# Patient Record
Sex: MALE | Race: WHITE | NOT HISPANIC OR LATINO | URBAN - METROPOLITAN AREA
[De-identification: names, ages, dates, MRNs, and addresses within clinical notes are randomized per-mention and may not be internally consistent; named-entity substitution may affect disease eponyms.]

---

## 2017-03-23 ENCOUNTER — OUTPATIENT (OUTPATIENT)
Dept: OUTPATIENT SERVICES | Facility: HOSPITAL | Age: 77
LOS: 1 days | Discharge: HOME | End: 2017-03-23

## 2017-06-27 DIAGNOSIS — R91.8 OTHER NONSPECIFIC ABNORMAL FINDING OF LUNG FIELD: ICD-10-CM

## 2017-08-24 ENCOUNTER — OUTPATIENT (OUTPATIENT)
Dept: OUTPATIENT SERVICES | Facility: HOSPITAL | Age: 77
LOS: 1 days | Discharge: HOME | End: 2017-08-24

## 2017-08-24 DIAGNOSIS — R04.0 EPISTAXIS: ICD-10-CM

## 2017-08-24 DIAGNOSIS — I10 ESSENTIAL (PRIMARY) HYPERTENSION: ICD-10-CM

## 2017-08-24 DIAGNOSIS — I25.10 ATHEROSCLEROTIC HEART DISEASE OF NATIVE CORONARY ARTERY WITHOUT ANGINA PECTORIS: ICD-10-CM

## 2017-08-24 DIAGNOSIS — C34.90 MALIGNANT NEOPLASM OF UNSPECIFIED PART OF UNSPECIFIED BRONCHUS OR LUNG: ICD-10-CM

## 2017-08-24 DIAGNOSIS — R05 COUGH: ICD-10-CM

## 2017-08-24 DIAGNOSIS — E78.5 HYPERLIPIDEMIA, UNSPECIFIED: ICD-10-CM

## 2018-01-31 ENCOUNTER — INPATIENT (INPATIENT)
Facility: HOSPITAL | Age: 78
LOS: 0 days | Discharge: HOME | End: 2018-02-01
Attending: INTERNAL MEDICINE | Admitting: INTERNAL MEDICINE

## 2018-02-04 ENCOUNTER — INPATIENT (INPATIENT)
Facility: HOSPITAL | Age: 78
LOS: 1 days | Discharge: HOME | End: 2018-02-06
Attending: INTERNAL MEDICINE | Admitting: INTERNAL MEDICINE

## 2018-02-04 VITALS
WEIGHT: 240.08 LBS | SYSTOLIC BLOOD PRESSURE: 185 MMHG | TEMPERATURE: 97 F | OXYGEN SATURATION: 96 % | DIASTOLIC BLOOD PRESSURE: 83 MMHG | HEART RATE: 51 BPM | RESPIRATION RATE: 18 BRPM

## 2018-02-04 DIAGNOSIS — R04.0 EPISTAXIS: ICD-10-CM

## 2018-02-04 DIAGNOSIS — Z90.2 ACQUIRED ABSENCE OF LUNG [PART OF]: Chronic | ICD-10-CM

## 2018-02-04 DIAGNOSIS — J44.9 CHRONIC OBSTRUCTIVE PULMONARY DISEASE, UNSPECIFIED: ICD-10-CM

## 2018-02-04 DIAGNOSIS — R07.9 CHEST PAIN, UNSPECIFIED: ICD-10-CM

## 2018-02-04 DIAGNOSIS — I10 ESSENTIAL (PRIMARY) HYPERTENSION: ICD-10-CM

## 2018-02-04 DIAGNOSIS — I25.10 ATHEROSCLEROTIC HEART DISEASE OF NATIVE CORONARY ARTERY WITHOUT ANGINA PECTORIS: ICD-10-CM

## 2018-02-04 DIAGNOSIS — K21.9 GASTRO-ESOPHAGEAL REFLUX DISEASE WITHOUT ESOPHAGITIS: ICD-10-CM

## 2018-02-04 DIAGNOSIS — E78.5 HYPERLIPIDEMIA, UNSPECIFIED: ICD-10-CM

## 2018-02-04 DIAGNOSIS — E89.0 POSTPROCEDURAL HYPOTHYROIDISM: Chronic | ICD-10-CM

## 2018-02-04 LAB
ALBUMIN SERPL ELPH-MCNC: 3.7 G/DL — SIGNIFICANT CHANGE UP (ref 3–5.5)
ALBUMIN SERPL ELPH-MCNC: 3.8 G/DL — SIGNIFICANT CHANGE UP (ref 3–5.5)
ALP SERPL-CCNC: 100 U/L — SIGNIFICANT CHANGE UP (ref 30–115)
ALP SERPL-CCNC: 92 U/L — SIGNIFICANT CHANGE UP (ref 30–115)
ALT FLD-CCNC: 12 U/L — SIGNIFICANT CHANGE UP (ref 0–41)
ALT FLD-CCNC: 15 U/L — SIGNIFICANT CHANGE UP (ref 0–41)
ANION GAP SERPL CALC-SCNC: 15 MMOL/L — HIGH (ref 7–14)
ANION GAP SERPL CALC-SCNC: 9 MMOL/L — SIGNIFICANT CHANGE UP (ref 7–14)
AST SERPL-CCNC: 16 U/L — SIGNIFICANT CHANGE UP (ref 0–41)
AST SERPL-CCNC: 19 U/L — SIGNIFICANT CHANGE UP (ref 0–41)
BASOPHILS # BLD AUTO: 0.03 K/UL — SIGNIFICANT CHANGE UP (ref 0–0.2)
BASOPHILS NFR BLD AUTO: 0.4 % — SIGNIFICANT CHANGE UP (ref 0–1)
BILIRUB DIRECT SERPL-MCNC: 0.3 MG/DL — HIGH (ref 0–0.2)
BILIRUB DIRECT SERPL-MCNC: 0.3 MG/DL — HIGH (ref 0–0.2)
BILIRUB INDIRECT FLD-MCNC: 0.6 MG/DL — SIGNIFICANT CHANGE UP
BILIRUB INDIRECT FLD-MCNC: 0.8 MG/DL — SIGNIFICANT CHANGE UP
BILIRUB SERPL-MCNC: 0.9 MG/DL — SIGNIFICANT CHANGE UP (ref 0.2–1.2)
BILIRUB SERPL-MCNC: 1.1 MG/DL — SIGNIFICANT CHANGE UP (ref 0.2–1.2)
BUN SERPL-MCNC: 14 MG/DL — SIGNIFICANT CHANGE UP (ref 10–20)
BUN SERPL-MCNC: 15 MG/DL — SIGNIFICANT CHANGE UP (ref 10–20)
CALCIUM SERPL-MCNC: 8.9 MG/DL — SIGNIFICANT CHANGE UP (ref 8.5–10.1)
CALCIUM SERPL-MCNC: 9.1 MG/DL — SIGNIFICANT CHANGE UP (ref 8.5–10.1)
CHLORIDE SERPL-SCNC: 103 MMOL/L — SIGNIFICANT CHANGE UP (ref 98–110)
CHLORIDE SERPL-SCNC: 99 MMOL/L — SIGNIFICANT CHANGE UP (ref 98–110)
CHOLEST SERPL-MCNC: 125 MG/DL — SIGNIFICANT CHANGE UP (ref 100–200)
CK MB BLD-MCNC: 3 % — SIGNIFICANT CHANGE UP (ref 0–4)
CK MB CFR SERPL CALC: 1.6 NG/ML — SIGNIFICANT CHANGE UP (ref 0.6–6.3)
CK MB CFR SERPL CALC: 1.7 NG/ML — SIGNIFICANT CHANGE UP (ref 0.6–6.3)
CK MB CFR SERPL CALC: 1.8 NG/ML — SIGNIFICANT CHANGE UP (ref 0.6–6.3)
CK SERPL-CCNC: 55 U/L — SIGNIFICANT CHANGE UP (ref 0–225)
CK SERPL-CCNC: 58 U/L — SIGNIFICANT CHANGE UP (ref 0–225)
CK SERPL-CCNC: 80 U/L — SIGNIFICANT CHANGE UP (ref 0–225)
CO2 SERPL-SCNC: 24 MMOL/L — SIGNIFICANT CHANGE UP (ref 17–32)
CO2 SERPL-SCNC: 30 MMOL/L — SIGNIFICANT CHANGE UP (ref 17–32)
CREAT SERPL-MCNC: 1.2 MG/DL — SIGNIFICANT CHANGE UP (ref 0.7–1.5)
CREAT SERPL-MCNC: 1.3 MG/DL — SIGNIFICANT CHANGE UP (ref 0.7–1.5)
EOSINOPHIL # BLD AUTO: 0.1 K/UL — SIGNIFICANT CHANGE UP (ref 0–0.7)
EOSINOPHIL NFR BLD AUTO: 1.3 % — SIGNIFICANT CHANGE UP (ref 0–8)
GLUCOSE SERPL-MCNC: 125 MG/DL — HIGH (ref 70–110)
GLUCOSE SERPL-MCNC: 134 MG/DL — HIGH (ref 70–110)
HCT VFR BLD CALC: 40.8 % — LOW (ref 42–52)
HDLC SERPL-MCNC: 43 MG/DL — SIGNIFICANT CHANGE UP (ref 40–60)
HGB BLD-MCNC: 13.2 G/DL — LOW (ref 14–18)
IMM GRANULOCYTES NFR BLD AUTO: 0.5 % — HIGH (ref 0.1–0.3)
LIDOCAIN IGE QN: 19 U/L — SIGNIFICANT CHANGE UP (ref 7–60)
LIPID PNL WITH DIRECT LDL SERPL: 62 MG/DL — SIGNIFICANT CHANGE UP (ref 50–100)
LYMPHOCYTES # BLD AUTO: 0.77 K/UL — LOW (ref 1.2–3.4)
LYMPHOCYTES # BLD AUTO: 10.3 % — LOW (ref 20.5–51.1)
MCHC RBC-ENTMCNC: 28.1 PG — SIGNIFICANT CHANGE UP (ref 27–31)
MCHC RBC-ENTMCNC: 32.4 G/DL — SIGNIFICANT CHANGE UP (ref 32–37)
MCV RBC AUTO: 86.8 FL — SIGNIFICANT CHANGE UP (ref 80–94)
MONOCYTES # BLD AUTO: 0.47 K/UL — SIGNIFICANT CHANGE UP (ref 0.1–0.6)
MONOCYTES NFR BLD AUTO: 6.3 % — SIGNIFICANT CHANGE UP (ref 1.7–9.3)
NEUTROPHILS # BLD AUTO: 6.03 K/UL — SIGNIFICANT CHANGE UP (ref 1.4–6.5)
NEUTROPHILS NFR BLD AUTO: 81.2 % — HIGH (ref 42.2–75.2)
PLATELET # BLD AUTO: 164 K/UL — SIGNIFICANT CHANGE UP (ref 130–400)
POTASSIUM SERPL-MCNC: 3.8 MMOL/L — SIGNIFICANT CHANGE UP (ref 3.5–5)
POTASSIUM SERPL-MCNC: 4.2 MMOL/L — SIGNIFICANT CHANGE UP (ref 3.5–5)
POTASSIUM SERPL-SCNC: 3.8 MMOL/L — SIGNIFICANT CHANGE UP (ref 3.5–5)
POTASSIUM SERPL-SCNC: 4.2 MMOL/L — SIGNIFICANT CHANGE UP (ref 3.5–5)
PROT SERPL-MCNC: 6.9 G/DL — SIGNIFICANT CHANGE UP (ref 6–8)
PROT SERPL-MCNC: 7 G/DL — SIGNIFICANT CHANGE UP (ref 6–8)
RBC # BLD: 4.7 M/UL — SIGNIFICANT CHANGE UP (ref 4.7–6.1)
RBC # FLD: 14.6 % — HIGH (ref 11.5–14.5)
SODIUM SERPL-SCNC: 138 MMOL/L — SIGNIFICANT CHANGE UP (ref 135–146)
SODIUM SERPL-SCNC: 142 MMOL/L — SIGNIFICANT CHANGE UP (ref 135–146)
TOTAL CHOLESTEROL/HDL RATIO MEASUREMENT: 2.9 RATIO — LOW (ref 4–5.5)
TRIGL SERPL-MCNC: 74 MG/DL — SIGNIFICANT CHANGE UP (ref 40–150)
TROPONIN I SERPL-MCNC: 0.02 NG/ML — SIGNIFICANT CHANGE UP (ref 0–0.05)
TROPONIN I SERPL-MCNC: <0.02 NG/ML — SIGNIFICANT CHANGE UP (ref 0–0.05)
TROPONIN I SERPL-MCNC: <0.02 NG/ML — SIGNIFICANT CHANGE UP (ref 0–0.05)
WBC # BLD: 7.44 K/UL — SIGNIFICANT CHANGE UP (ref 4.8–10.8)
WBC # FLD AUTO: 7.44 K/UL — SIGNIFICANT CHANGE UP (ref 4.8–10.8)

## 2018-02-04 RX ORDER — ONDANSETRON 8 MG/1
4 TABLET, FILM COATED ORAL ONCE
Qty: 0 | Refills: 0 | Status: COMPLETED | OUTPATIENT
Start: 2018-02-04 | End: 2018-02-04

## 2018-02-04 RX ORDER — ACETAMINOPHEN 500 MG
650 TABLET ORAL EVERY 4 HOURS
Qty: 0 | Refills: 0 | Status: DISCONTINUED | OUTPATIENT
Start: 2018-02-04 | End: 2018-02-06

## 2018-02-04 RX ORDER — PANTOPRAZOLE SODIUM 20 MG/1
1 TABLET, DELAYED RELEASE ORAL
Qty: 0 | Refills: 0 | COMMUNITY

## 2018-02-04 RX ORDER — MORPHINE SULFATE 50 MG/1
4 CAPSULE, EXTENDED RELEASE ORAL ONCE
Qty: 0 | Refills: 0 | Status: DISCONTINUED | OUTPATIENT
Start: 2018-02-04 | End: 2018-02-04

## 2018-02-04 RX ORDER — METOPROLOL TARTRATE 50 MG
50 TABLET ORAL DAILY
Qty: 0 | Refills: 0 | Status: DISCONTINUED | OUTPATIENT
Start: 2018-02-04 | End: 2018-02-06

## 2018-02-04 RX ORDER — PANTOPRAZOLE SODIUM 20 MG/1
40 TABLET, DELAYED RELEASE ORAL
Qty: 0 | Refills: 0 | Status: DISCONTINUED | OUTPATIENT
Start: 2018-02-04 | End: 2018-02-06

## 2018-02-04 RX ORDER — LOSARTAN POTASSIUM 100 MG/1
50 TABLET, FILM COATED ORAL DAILY
Qty: 0 | Refills: 0 | Status: DISCONTINUED | OUTPATIENT
Start: 2018-02-04 | End: 2018-02-04

## 2018-02-04 RX ORDER — LOSARTAN POTASSIUM 100 MG/1
100 TABLET, FILM COATED ORAL DAILY
Qty: 0 | Refills: 0 | Status: DISCONTINUED | OUTPATIENT
Start: 2018-02-04 | End: 2018-02-06

## 2018-02-04 RX ORDER — METOPROLOL TARTRATE 50 MG
1 TABLET ORAL
Qty: 0 | Refills: 0 | COMMUNITY

## 2018-02-04 RX ORDER — LEVOTHYROXINE SODIUM 125 MCG
137 TABLET ORAL DAILY
Qty: 0 | Refills: 0 | Status: DISCONTINUED | OUTPATIENT
Start: 2018-02-04 | End: 2018-02-04

## 2018-02-04 RX ORDER — HEPARIN SODIUM 5000 [USP'U]/ML
5000 INJECTION INTRAVENOUS; SUBCUTANEOUS EVERY 8 HOURS
Qty: 0 | Refills: 0 | Status: DISCONTINUED | OUTPATIENT
Start: 2018-02-04 | End: 2018-02-06

## 2018-02-04 RX ORDER — LEVOTHYROXINE SODIUM 125 MCG
1 TABLET ORAL
Qty: 0 | Refills: 0 | COMMUNITY

## 2018-02-04 RX ORDER — BUDESONIDE AND FORMOTEROL FUMARATE DIHYDRATE 160; 4.5 UG/1; UG/1
2 AEROSOL RESPIRATORY (INHALATION)
Qty: 0 | Refills: 0 | Status: DISCONTINUED | OUTPATIENT
Start: 2018-02-04 | End: 2018-02-06

## 2018-02-04 RX ORDER — ASPIRIN/CALCIUM CARB/MAGNESIUM 324 MG
81 TABLET ORAL ONCE
Qty: 0 | Refills: 0 | Status: COMPLETED | OUTPATIENT
Start: 2018-02-04 | End: 2018-02-04

## 2018-02-04 RX ORDER — SODIUM CHLORIDE 9 MG/ML
3 INJECTION INTRAMUSCULAR; INTRAVENOUS; SUBCUTANEOUS ONCE
Qty: 0 | Refills: 0 | Status: COMPLETED | OUTPATIENT
Start: 2018-02-04 | End: 2018-02-04

## 2018-02-04 RX ADMIN — Medication 81 MILLIGRAM(S): at 11:24

## 2018-02-04 RX ADMIN — MORPHINE SULFATE 4 MILLIGRAM(S): 50 CAPSULE, EXTENDED RELEASE ORAL at 16:36

## 2018-02-04 RX ADMIN — MORPHINE SULFATE 4 MILLIGRAM(S): 50 CAPSULE, EXTENDED RELEASE ORAL at 12:13

## 2018-02-04 RX ADMIN — HEPARIN SODIUM 5000 UNIT(S): 5000 INJECTION INTRAVENOUS; SUBCUTANEOUS at 22:26

## 2018-02-04 RX ADMIN — MORPHINE SULFATE 4 MILLIGRAM(S): 50 CAPSULE, EXTENDED RELEASE ORAL at 12:31

## 2018-02-04 RX ADMIN — SODIUM CHLORIDE 3 MILLILITER(S): 9 INJECTION INTRAMUSCULAR; INTRAVENOUS; SUBCUTANEOUS at 12:13

## 2018-02-04 RX ADMIN — MORPHINE SULFATE 4 MILLIGRAM(S): 50 CAPSULE, EXTENDED RELEASE ORAL at 17:30

## 2018-02-04 RX ADMIN — MORPHINE SULFATE 4 MILLIGRAM(S): 50 CAPSULE, EXTENDED RELEASE ORAL at 16:00

## 2018-02-04 RX ADMIN — MORPHINE SULFATE 4 MILLIGRAM(S): 50 CAPSULE, EXTENDED RELEASE ORAL at 15:29

## 2018-02-04 RX ADMIN — ONDANSETRON 4 MILLIGRAM(S): 8 TABLET, FILM COATED ORAL at 12:13

## 2018-02-04 RX ADMIN — ONDANSETRON 4 MILLIGRAM(S): 8 TABLET, FILM COATED ORAL at 18:28

## 2018-02-04 RX ADMIN — LOSARTAN POTASSIUM 100 MILLIGRAM(S): 100 TABLET, FILM COATED ORAL at 22:26

## 2018-02-04 RX ADMIN — Medication 30 MILLILITER(S): at 22:26

## 2018-02-04 RX ADMIN — Medication 50 MILLIGRAM(S): at 22:26

## 2018-02-04 NOTE — ED ADULT NURSE NOTE - PMH
Gastroesophageal reflux disease, esophagitis presence not specified    Hypertension, unspecified type    Malignant neoplasm of lung, unspecified laterality, unspecified part of lung

## 2018-02-04 NOTE — ED PROVIDER NOTE - PMH
Chronic obstructive pulmonary disease, unspecified COPD type    Coronary artery disease involving native heart, angina presence unspecified, unspecified vessel or lesion type    Dyslipidemia    Gastroesophageal reflux disease, esophagitis presence not specified    Hypertension, unspecified type    Malignant neoplasm of lung, unspecified laterality, unspecified part of lung    ALTAF (obstructive sleep apnea)    Thyroid cancer

## 2018-02-04 NOTE — H&P ADULT - NSHPREVIEWOFSYSTEMS_GEN_ALL_CORE
REVIEW OF SYSTEMS:    CONSTITUTIONAL: No weakness, fevers or chills  EYES/ENT: No visual changes;  No vertigo or throat pain   NECK: No pain or stiffness  RESPIRATORY: No cough, wheezing, hemoptysis; No shortness of breath  CARDIOVASCULAR: see HPI  GASTROINTESTINAL: No abdominal or epigastric pain. No nausea, vomiting, or hematemesis; No diarrhea or constipation. No melena or hematochezia.  GENITOURINARY: No dysuria, frequency or hematuria  NEUROLOGICAL: No numbness or weakness  SKIN: No itching, rashes

## 2018-02-04 NOTE — ED PROVIDER NOTE - OBJECTIVE STATEMENT
76yo M with PMHx CAD s/p PCI, lungCA s/p LLL lobectomy, thyroid CA s/p thyroidectomy, HTN, HLD, COPD/ALTAF p/w chest pain x7 hours. CP started at 1am this morning, patient was lying in bed at the time. Feels like pressure, started in mid chest but now radiating to left and right anterior chest. Assoc with intermittent cough for few days. Pt was recently admitted to the hospital on 1/31 for similar chest pain, 3 sets negative troponins, had negative nuclear stress test, EF 60%. Denies nausea, vomiting, diaphoresis, SOB, palpitations. Able to climb flight of staris without chest pain at baseline. Quit smoking 20 years ago.   Cardiology: Dr. Calderon.

## 2018-02-04 NOTE — H&P ADULT - HISTORY OF PRESENT ILLNESS
77M with CAD s/p Stent, COPD, Lung Ca s/p lobectomy, HTN, HLD, and Thyroid Ca s/p removal presents for recurrent eipsodes of chest pain. Patient was recently seen at [facility] for chest pain on 1/31 and had a negative work up with an unremarkable Nuc. Stress test. Patient was discharged home and subsequently experienced similar pains 2 more time with today being the worst. Patient described the pain as 9/10  at its peak, progressive (waking him up from sleep), pressure like, lower substernal area with radiation to b/l chest and back, aggrevating factors noted. Pain occurs while sleeping or at rest. Patient denies recent illness, fever, chills, SOB, LOC, Palpitations, leg swelling, n/v/d, or excessive sweating. Patient states he has a history of GERD but his heartburn is well controlled currently and has no abdominal pain, blood in stools.    Nuc Stress Test 2/1: NORMAL ADENOSINE / REST MYOCARDIAL PERFUSION TOMOGRAPHY, WITH NO EVIDENCE FOR ISCHEMIA DURING ADENOSINE INFUSION. NORMAL RESTING LEFT VENTRICULAR WALL MOTION AND WALL THICKENING. LEFT VENTRICULAR EJECTION FRACTION OF 60% WHICH IS WITHIN RANGE OF NORMAL. 77M with CAD s/p Stent (several yrs ago), COPD, Lung Ca s/p lobectomy, HTN, HLD, and Thyroid Ca s/p removal presents for recurrent episodes of chest pain. Patient was recently seen at [facility] for chest pain on 1/31 and had a negative work up with an unremarkable Nuc. Stress test. Patient was discharged home and subsequently experienced similar pains 2 more time with today being the worst. Patient described the pain as 9/10  at its peak, progressive (waking him up from sleep), pressure like, lower substernal area with radiation to b/l chest and back,no aggravating factors noted. Pain occurs while sleeping or at rest. pain resolve don its own. Patient denies recent illness, fever, chills, SOB, LOC, Palpitations, leg swelling, n/v/d, or excessive sweating. Patient states he has a history of GERD but his heartburn is well controlled currently and has no abdominal pain, blood in stools.    Nuc Stress Test 2/1: NORMAL ADENOSINE / REST MYOCARDIAL PERFUSION TOMOGRAPHY, WITH NO EVIDENCE FOR ISCHEMIA DURING ADENOSINE INFUSION. NORMAL RESTING LEFT VENTRICULAR WALL MOTION AND WALL THICKENING. LEFT VENTRICULAR EJECTION FRACTION OF 60% WHICH IS WITHIN RANGE OF NORMAL.

## 2018-02-04 NOTE — ED PROVIDER NOTE - NS ED ROS FT
Constitutional: No fever, chills.  Eyes:  No visual changes  ENMT:  No sore throat  Cardiac:  +chest pain. No palpitations  Respiratory:  No cough, SOB  GI:  No nausea, vomiting, diarrhea, abdominal pain.  :  No dysuria  MS:  No myalgia, back pain  Neuro:  No headache or lightheadedness  Skin:  No skin rash  Endocrine: h/o pre-DM  Except as documented in the HPI,  all other systems are negative.

## 2018-02-04 NOTE — ED PROVIDER NOTE - PHYSICAL EXAMINATION
Vital signs reviewed  GENERAL: Patient well appearing, NAD  HEAD: NCAT  EYES: PERRL, EOMI  ENT: MMM  NECK: Supple, non tender, trachea midline, no JVD  RESPIRATORY: Normal respiratory effort. CTA B/L. No wheezing, rales, rhonchi  CARDIOVASCULAR: Regular rate and rhythm. Normal S1/S2. No murmurs, rubs or gallops.  ABDOMEN: Soft. Obese. Nondistended. Nontender. No guarding or rebound. No palpable masses. Normal bowel sounds. No CVA tenderness.  MUSCULOSKELETAL/EXTREMITIES: Brisk cap refill. 2+ radial pulses. No leg edema.  SKIN:  Warm and dry. No acute rash.  NEURO: AAOx3. No gross FND.  PSYCHIATRIC: Cooperative. Affect appropriate. Insight and judgement normal. Memory intact. Thought normal.

## 2018-02-04 NOTE — H&P ADULT - PROBLEM SELECTOR PLAN 2
Uncontrolled, was previously uncontrolled in prior visit   Will increase losartan to 100mg  cont other home medications

## 2018-02-04 NOTE — ED PROVIDER NOTE - PROGRESS NOTE DETAILS
Pt had episode of vomiting in ED, c/o pain radiating to back. Will give morphine, zofran, order CTA dissection. Pt states back pain resolved, pain remains midsternal. Nausea resolved.

## 2018-02-04 NOTE — H&P ADULT - NSHPLABSRESULTS_GEN_ALL_CORE
ICU Vital Signs Last 24 Hrs  T(C): 35.9 (04 Feb 2018 09:30), Max: 35.9 (04 Feb 2018 09:30)  T(F): 96.7 (04 Feb 2018 09:30), Max: 96.7 (04 Feb 2018 09:30)  HR: 51 (04 Feb 2018 09:30) (51 - 51)  BP: 185/83 (04 Feb 2018 09:30) (185/83 - 185/83)  BP(mean): --  ABP: --  ABP(mean): --  RR: 18 (04 Feb 2018 09:30) (18 - 18)  SpO2: 96% (04 Feb 2018 09:30) (96% - 96%)                          13.2   7.44  )-----------( 164      ( 04 Feb 2018 10:42 )             40.8   02-04    142  |  103  |  15  ----------------------------<  125<H>  4.2   |  24  |  1.2    Ca    8.9      04 Feb 2018 10:42    CARDIAC MARKERS ( 04 Feb 2018 10:42 )  <0.02 ng/mL / x     / 80 U/L / x     / 1.7 ng/mL

## 2018-02-04 NOTE — H&P ADULT - FAMILY HISTORY
Mother  Still living? Unknown  Family history of heart disease, Age at diagnosis: Age Unknown     Father  Still living? No  Family history of myocardial infarction, Age at diagnosis: Age Unknown     Sibling  Still living? No  Family history of prostate cancer, Age at diagnosis: Age Unknown

## 2018-02-04 NOTE — ED PROVIDER NOTE - MEDICAL DECISION MAKING DETAILS
I personally evaluated the patient. I reviewed the Resident’s or Physician Assistant’s note (as assigned above), and agree with the findings and plan except as documented in my note.    pt admitted for CP with acute ischemic EKG changes, TWI v1-5, ruled out for aortic dissection and cholecystitis. hx CAD stents.

## 2018-02-04 NOTE — H&P ADULT - PROBLEM SELECTOR PLAN 1
HTN urgency vs ACS vs GERD  r/o ACS, repeat cardiac Enzymes though doubt myocardial damage/MI  Cardiology cs: masticculo  Admit to tele  EKG shows chronic LBBB but possible reversal of block with lower heart rates. T wave inversions present on V1, v2, v3, v4 r/o obstructive CAD  Cardiology cs: masticculo  Admit to tele  initial ekg: sinus joseph with inverted/biphasic t waves in precordial leads.   second ekg- sinus rhythm with 1st degree av block, LBBB (pt had LBBB prior) r/o obstructive CAD  Cardiology cs: masticculo  Admit to tele  ASA 81mg  initial ekg: sinus joseph with inverted/biphasic t waves in precordial leads.   second ekg- sinus rhythm with 1st degree av block, LBBB (pt had LBBB prior)

## 2018-02-04 NOTE — ED PROVIDER NOTE - ATTENDING CONTRIBUTION TO CARE
77M PMH CAD stent, lung ca s/p lobectomy, thyroid ca s/p resection, htn, hl, copd, magdaleno, p/w. started last night while laying in bed. chest pressure which is bilateral. assoc w dry cough. admitted 1/31 for CP, had 3 neg CE and neg nuc stress, ef 60%. ex smoker. while in ED pt developed vomiting and BP. no trauma. no le edema, calf pain, immobolization. pain is also epigastric. no d/c. no urinary sx. no neuro sx. on exam, AFVSS, well deep nad, ncat,eomi, perrla, mmm, lctab, rrr nl s1s2 no mrg, nontender chest, abd soft ntnd, aaox3, no focal deficits, a/p: CP, vomiting, BP, recent negative work up ACS, however new TWI today on EKG V1-5, r/o ACS, will also r/o dissection given active vomiting and BP in ED, H&P not c/w pe, ptx, pna, esoph perf, tamponde. will do labs, ekg/trop, cxr, asa, CTA, ivf, pain control, antiemetics, re-eval.

## 2018-02-04 NOTE — H&P ADULT - ASSESSMENT
77M with CAD s/p Stent, COPD, Lung Ca s/p lobectomy, HTN, HLD, and Thyroid Ca s/p removal presents for recurrent episodes of chest pain. Patient was recently seen at [facility] for chest pain on 1/31 and had a negative work up with an unremarkable Nuc Stress test and work up.       DVT ppx: Hepatin  Diet: Anti reflux, Heart Healthy, Low Salt  Full Code  Admit to tele  Activity : as tolerated 77M with CAD s/p Stent, COPD, Lung Ca s/p lobectomy, HTN, HLD, and Thyroid Ca s/p removal presents for recurrent episodes of chest pain. Patient was recently seen at [facility] for chest pain on 1/31 and had a negative work up with an unremarkable Nuc Stress test and work up.       DVT ppx: Heparin  Diet: Anti reflux, Heart Healthy, Low Salt  Full Code  Admit to tele  Activity : as tolerated

## 2018-02-04 NOTE — H&P ADULT - PMH
Chronic obstructive pulmonary disease, unspecified COPD type    Coronary artery disease involving native heart, angina presence unspecified, unspecified vessel or lesion type    Dyslipidemia    Gastroesophageal reflux disease, esophagitis presence not specified    Hypertension, unspecified type    Lung cancer    ALTAF (obstructive sleep apnea)    Thyroid cancer

## 2018-02-05 LAB
CK MB BLD-MCNC: 2 % — SIGNIFICANT CHANGE UP (ref 0–4)
CK MB CFR SERPL CALC: 1.7 NG/ML — SIGNIFICANT CHANGE UP (ref 0.6–6.3)
CK SERPL-CCNC: 95 U/L — SIGNIFICANT CHANGE UP (ref 0–225)
TROPONIN I SERPL-MCNC: 0.03 NG/ML — SIGNIFICANT CHANGE UP (ref 0–0.05)

## 2018-02-05 RX ORDER — SODIUM CHLORIDE 9 MG/ML
1000 INJECTION INTRAMUSCULAR; INTRAVENOUS; SUBCUTANEOUS
Qty: 0 | Refills: 0 | Status: DISCONTINUED | OUTPATIENT
Start: 2018-02-05 | End: 2018-02-06

## 2018-02-05 RX ORDER — RABEPRAZOLE 20 MG/1
40 TABLET, DELAYED RELEASE ORAL
Qty: 0 | Refills: 0 | COMMUNITY

## 2018-02-05 RX ORDER — ASPIRIN/CALCIUM CARB/MAGNESIUM 324 MG
325 TABLET ORAL ONCE
Qty: 0 | Refills: 0 | Status: COMPLETED | OUTPATIENT
Start: 2018-02-05 | End: 2018-02-05

## 2018-02-05 RX ORDER — ONDANSETRON 8 MG/1
4 TABLET, FILM COATED ORAL
Qty: 0 | Refills: 0 | Status: DISCONTINUED | OUTPATIENT
Start: 2018-02-05 | End: 2018-02-06

## 2018-02-05 RX ADMIN — HEPARIN SODIUM 5000 UNIT(S): 5000 INJECTION INTRAVENOUS; SUBCUTANEOUS at 21:20

## 2018-02-05 RX ADMIN — Medication 30 MILLILITER(S): at 18:01

## 2018-02-05 RX ADMIN — Medication 325 MILLIGRAM(S): at 12:47

## 2018-02-05 RX ADMIN — PANTOPRAZOLE SODIUM 40 MILLIGRAM(S): 20 TABLET, DELAYED RELEASE ORAL at 18:02

## 2018-02-05 RX ADMIN — SODIUM CHLORIDE 100 MILLILITER(S): 9 INJECTION INTRAMUSCULAR; INTRAVENOUS; SUBCUTANEOUS at 15:59

## 2018-02-05 RX ADMIN — Medication 30 MILLILITER(S): at 09:23

## 2018-02-05 RX ADMIN — Medication 50 MILLIGRAM(S): at 05:45

## 2018-02-05 RX ADMIN — PANTOPRAZOLE SODIUM 40 MILLIGRAM(S): 20 TABLET, DELAYED RELEASE ORAL at 08:52

## 2018-02-05 RX ADMIN — LOSARTAN POTASSIUM 100 MILLIGRAM(S): 100 TABLET, FILM COATED ORAL at 05:45

## 2018-02-05 RX ADMIN — Medication 30 MILLILITER(S): at 21:20

## 2018-02-05 RX ADMIN — SODIUM CHLORIDE 100 MILLILITER(S): 9 INJECTION INTRAMUSCULAR; INTRAVENOUS; SUBCUTANEOUS at 19:51

## 2018-02-05 RX ADMIN — Medication 30 MILLILITER(S): at 05:31

## 2018-02-05 RX ADMIN — HEPARIN SODIUM 5000 UNIT(S): 5000 INJECTION INTRAVENOUS; SUBCUTANEOUS at 05:32

## 2018-02-05 RX ADMIN — BUDESONIDE AND FORMOTEROL FUMARATE DIHYDRATE 2 PUFF(S): 160; 4.5 AEROSOL RESPIRATORY (INHALATION) at 20:42

## 2018-02-05 RX ADMIN — BUDESONIDE AND FORMOTEROL FUMARATE DIHYDRATE 2 PUFF(S): 160; 4.5 AEROSOL RESPIRATORY (INHALATION) at 08:54

## 2018-02-05 NOTE — PROGRESS NOTE ADULT - PROBLEM SELECTOR PROBLEM 4
Coronary artery disease involving native heart, angina presence unspecified, unspecified vessel or lesion type Chronic obstructive pulmonary disease, unspecified COPD type

## 2018-02-05 NOTE — PROGRESS NOTE ADULT - PROBLEM SELECTOR PLAN 1
r/o obstructive CAD  Cardiology cs: masticculo  Admit to tele  ASA 81mg  initial ekg: sinus joseph with inverted/biphasic t waves in precordial leads.   second ekg- sinus rhythm with 1st degree av block, LBBB (pt had LBBB prior) Cardiac catheterization today to r/o obstructive CAD  c/w metoprolol+ losartan

## 2018-02-05 NOTE — PROGRESS NOTE ADULT - ASSESSMENT
77M with CAD s/p Stent, COPD, Lung Ca s/p lobectomy, HTN, HLD, and Thyroid Ca s/p removal presents for recurrent episodes of chest pain. Patient was recently seen at [facility] for chest pain on 1/31 and had a negative work up with an unremarkable Nuc Stress test and work up.       DVT ppx: Heparin  Diet: Anti reflux, Heart Healthy, Low Salt  Full Code  Admit to tele  Activity : as tolerated

## 2018-02-05 NOTE — PATIENT PROFILE ADULT. - ABILITY TO HEAR (WITH HEARING AID OR HEARING APPLIANCE IF NORMALLY USED):
left ear impaired/Mildly to Moderately Impaired: difficulty hearing in some environments or speaker may need to increase volume or speak distinctly

## 2018-02-05 NOTE — PROGRESS NOTE ADULT - SUBJECTIVE AND OBJECTIVE BOX
PRE-OP DIAGNOSIS: CAD    PROCEDURE: LHC/SCA    Physician: Caitlin	  Assistant: Evan    ANESTHESIA TYPE:  [  ]General Anesthesia  [ x ] Sedation  [x  ] Local/Regional    ESTIMATED BLOOD LOSS:      10 mL    CONDITION  [  ] Critical  [  ] Serious  [  ]Fair  [ x ]Good      SPECIMENS REMOVED (IF APPLICABLE):      IV CONTRAST:            50 mL      IMPLANTS (IF APPLICABLE)      FINDINGS    Left Heart Catheterization:    [ ] Normal Coronary Arteries  [ ] Luminal Irregularities  [ x] Non-obstructive CAD, patent stents                    POST-OP DIAGNOSIS CAD          PLAN OF CARE  [ ] D/C Home today  [ ]  D/C in AM  [x ] Return to In-patient bed  [ ] Admit for observation  [ ] Return for staged procedure:  [ ] CT Surgery consult called  [x ]  Continue ASA, B-blocker & Statin therapy

## 2018-02-05 NOTE — PATIENT PROFILE ADULT. - VISION (WITH CORRECTIVE LENSES IF THE PATIENT USUALLY WEARS THEM):
Partially impaired: cannot see medication labels or newsprint, but can see obstacles in path, and the surrounding layout; can count fingers at arm's length/patient is wearing glasses

## 2018-02-05 NOTE — PROGRESS NOTE ADULT - SUBJECTIVE AND OBJECTIVE BOX
Patient is a 77y old  Male who presents with a chief complaint of Recurrent Chest Pain (04 Feb 2018 21:02)      PAST MEDICAL & SURGICAL HISTORY:  Lung cancer  Coronary artery disease involving native heart, angina presence unspecified, unspecified vessel or lesion type  ALTAF (obstructive sleep apnea)  Chronic obstructive pulmonary disease, unspecified COPD type  Dyslipidemia  Thyroid cancer  Hypertension, unspecified type  Gastroesophageal reflux disease, esophagitis presence not specified  H/O thyroidectomy  S/P lobectomy of lung      No Known Allergies      MEDICATIONS  (STANDING):  aluminum hydroxide/magnesium hydroxide/simethicone Suspension 30 milliLiter(s) Oral every 4 hours  buDESOnide 160 MICROgram(s)/formoterol 4.5 MICROgram(s) Inhaler 2 Puff(s) Inhalation two times a day  heparin  Injectable 5000 Unit(s) SubCutaneous every 8 hours  losartan 100 milliGRAM(s) Oral daily  metoprolol succinate ER 50 milliGRAM(s) Oral daily  pantoprazole    Tablet 40 milliGRAM(s) Oral two times a day before meals    MEDICATIONS  (PRN):  acetaminophen   Tablet. 650 milliGRAM(s) Oral every 4 hours PRN Mild Pain (1 - 3)      Overnight events:    Vital Signs Last 24 Hrs  T(C): 36.7 (05 Feb 2018 06:11), Max: 37.3 (04 Feb 2018 23:43)  T(F): 98.1 (05 Feb 2018 06:11), Max: 99.2 (04 Feb 2018 23:43)  HR: 68 (05 Feb 2018 06:11) (51 - 74)  BP: 158/74 (05 Feb 2018 06:11) (158/74 - 185/83)  BP(mean): --  RR: 18 (05 Feb 2018 06:11) (18 - 18)  SpO2: 96% (04 Feb 2018 09:30) (96% - 96%)  CAPILLARY BLOOD GLUCOSE        I&O's Summary      PHYSICAL EXAM:  Appearance: Normal	  HEENT:   Normal oral mucosa, PERRL, EOMI	  Lymphatic: No lymphadenopathy  Cardiovascular: Normal S1 S2, No JVD, No murmurs, No edema  Respiratory: Lungs clear to auscultation	  Psychiatry: A & O x 3, Mood & affect appropriate  Gastrointestinal:  Soft, Non-tender, + BS	  Skin: No rashes, No ecchymoses, No cyanosis	  Neurologic: Non-focal, A&Ox3, nonfocal, TORRES x 4  Extremities: Normal range of motion, No clubbing, cyanosis or edema  Vascular: Peripheral pulses palpable 2+ bilaterally        Labs:                        13.2   7.44  )-----------( 164      ( 04 Feb 2018 10:42 )             40.8             02-04    138  |  99  |  14  ----------------------------<  134<H>  3.8   |  30  |  1.3    Ca    9.1      04 Feb 2018 21:56    TPro  6.9  /  Alb  3.8  /  TBili  1.1  /  DBili  0.3<H>  /  AST  19  /  ALT  15  /  AlkPhos  92  02-04    LIVER FUNCTIONS - ( 04 Feb 2018 21:56 )  Alb: 3.8 g/dL / Pro: 6.9 g/dL / ALK PHOS: 92 U/L / ALT: 15 U/L / AST: 19 U/L / GGT: x                   CARDIAC MARKERS ( 04 Feb 2018 21:56 )  0.02 ng/mL / x     / 58 U/L / x     / 1.8 ng/mL  CARDIAC MARKERS ( 04 Feb 2018 18:19 )  <0.02 ng/mL / x     / 55 U/L / x     / 1.6 ng/mL  CARDIAC MARKERS ( 04 Feb 2018 10:42 )  <0.02 ng/mL / x     / 80 U/L / x     / 1.7 ng/mL              Imaging:  CT angio 2/4/18: No evidence of acute intramural hematoma or aortic dissection.  Gallbladder wall edema, if there is clinical concern for cholecystitis,   further evaluation with abdominal ultrasound is recommended.  Post left lower lobectomy with small left pleural effusion, stable.  US abd 2/4/18: Nonspecific gallbladder wall thickening with no sonographic evidence of   cholelithiasis, pericholecystic fluid or sonographic Mims's sign.      ECG: LENGTH OF HOSPITAL STAY: 1d    CHIEF COMPLAINT:   Patient is a 77y old  Male who presents with a chief complaint of chest pain (05 Feb 2018 08:56)      HISTORY OF PRESENTING ILLNESS:   77M with CAD s/p Stent (several yrs ago), COPD, Lung Ca s/p lobectomy, HTN, HLD, and Thyroid Ca s/p removal presents for recurrent episodes of chest pain. Patient was recently seen at [facility] for chest pain on 1/31 and had a negative work up with an unremarkable Nuc. Stress test. Patient was discharged home and subsequently experienced similar pains 2 more time with today being the worst. Patient described the pain as 9/10  at its peak, progressive (waking him up from sleep), pressure like, lower substernal area with radiation to b/l chest and back,no aggravating factors noted. Pain occurs while sleeping or at rest. pain resolve don its own. Patient denies recent illness, fever, chills, SOB, LOC, Palpitations, leg swelling, n/v/d, or excessive sweating. Patient states he has a history of GERD but his heartburn is well controlled currently and has no abdominal pain, blood in stools.    Nuc Stress Test 2/1: NORMAL ADENOSINE / REST MYOCARDIAL PERFUSION TOMOGRAPHY, WITH NO EVIDENCE FOR ISCHEMIA DURING ADENOSINE INFUSION. NORMAL RESTING LEFT VENTRICULAR WALL MOTION AND WALL THICKENING. LEFT VENTRICULAR EJECTION FRACTION OF 60% WHICH IS WITHIN RANGE OF NORMAL. (04 Feb 2018 21:02)    PAST MEDICAL & SURGICAL HISTORY  PAST MEDICAL & SURGICAL HISTORY:  Lung cancer  Coronary artery disease involving native heart, angina presence unspecified, unspecified vessel or lesion type  ALTAF (obstructive sleep apnea)  Chronic obstructive pulmonary disease, unspecified COPD type  Dyslipidemia  Thyroid cancer  Hypertension, unspecified type  Gastroesophageal reflux disease, esophagitis presence not specified  H/O thyroidectomy  S/P lobectomy of lung    ALLERGIES:  No Known Allergies    MEDICATIONS:  STANDING MEDICATIONS  aluminum hydroxide/magnesium hydroxide/simethicone Suspension 30 milliLiter(s) Oral every 4 hours  buDESOnide 160 MICROgram(s)/formoterol 4.5 MICROgram(s) Inhaler 2 Puff(s) Inhalation two times a day  heparin  Injectable 5000 Unit(s) SubCutaneous every 8 hours  losartan 100 milliGRAM(s) Oral daily  metoprolol succinate ER 50 milliGRAM(s) Oral daily  pantoprazole    Tablet 40 milliGRAM(s) Oral two times a day before meals    PRN MEDICATIONS  acetaminophen   Tablet. 650 milliGRAM(s) Oral every 4 hours PRN  ondansetron Injectable 4 milliGRAM(s) IV Push two times a day PRN    VITALS:   T(F): 98.1  HR: 68  BP: 158/74  RR: 18  SpO2: --    LABS:                        13.2   7.44  )-----------( 164      ( 04 Feb 2018 10:42 )             40.8     02-04    138  |  99  |  14  ----------------------------<  134<H>  3.8   |  30  |  1.3    Ca    9.1      04 Feb 2018 21:56    TPro  6.9  /  Alb  3.8  /  TBili  1.1  /  DBili  0.3<H>  /  AST  19  /  ALT  15  /  AlkPhos  92  02-04          Creatine Kinase, Serum: 95 U/L (02-05-18 @ 07:47)  Troponin I, Serum: 0.03 ng/mL (02-05-18 @ 07:47)  Creatine Kinase, Serum: 58 U/L (02-04-18 @ 21:56)  Troponin I, Serum: 0.02 ng/mL (02-04-18 @ 21:56)  Creatine Kinase, Serum: 55 U/L (02-04-18 @ 18:19)  Troponin I, Serum: <0.02 ng/mL (02-04-18 @ 18:19)      CARDIAC MARKERS ( 05 Feb 2018 07:47 )  0.03 ng/mL / x     / 95 U/L / x     / 1.7 ng/mL  CARDIAC MARKERS ( 04 Feb 2018 21:56 )  0.02 ng/mL / x     / 58 U/L / x     / 1.8 ng/mL  CARDIAC MARKERS ( 04 Feb 2018 18:19 )  <0.02 ng/mL / x     / 55 U/L / x     / 1.6 ng/mL  CARDIAC MARKERS ( 04 Feb 2018 10:42 )  <0.02 ng/mL / x     / 80 U/L / x     / 1.7 ng/mL      RADIOLOGY:  CT angio 2/4/18: No evidence of acute intramural hematoma or aortic dissection.  Gallbladder wall edema, if there is clinical concern for cholecystitis,   further evaluation with abdominal ultrasound is recommended.  Post left lower lobectomy with small left pleural effusion, stable.  US abd 2/4/18: Nonspecific gallbladder wall thickening with no sonographic evidence of   cholelithiasis, pericholecystic fluid or sonographic Mims's sign.    initial ekg: sinus joseph with inverted/biphasic t waves in precordial leads.   second ekg- sinus rhythm with 1st degree av block, LBBB (pt had LBBB prior)    PHYSICAL EXAM:  GEN: No acute distress  HEENT:   Normal oral mucosa, PERRL, EOMI  LUNGS: Clear to auscultation bilaterally   HEART: S1/S2 present. RRR.   ABD: Soft, non-tender, non-distended. Bowel sounds present  EXT: no edema, no cyanosis  NEURO: AAOX3

## 2018-02-06 ENCOUNTER — TRANSCRIPTION ENCOUNTER (OUTPATIENT)
Age: 78
End: 2018-02-06

## 2018-02-06 VITALS — RESPIRATION RATE: 18 BRPM | DIASTOLIC BLOOD PRESSURE: 53 MMHG | TEMPERATURE: 98 F | SYSTOLIC BLOOD PRESSURE: 110 MMHG

## 2018-02-06 DIAGNOSIS — I16.0 HYPERTENSIVE URGENCY: ICD-10-CM

## 2018-02-06 DIAGNOSIS — Z95.5 PRESENCE OF CORONARY ANGIOPLASTY IMPLANT AND GRAFT: ICD-10-CM

## 2018-02-06 DIAGNOSIS — Z85.118 PERSONAL HISTORY OF OTHER MALIGNANT NEOPLASM OF BRONCHUS AND LUNG: ICD-10-CM

## 2018-02-06 DIAGNOSIS — M10.9 GOUT, UNSPECIFIED: ICD-10-CM

## 2018-02-06 DIAGNOSIS — R07.9 CHEST PAIN, UNSPECIFIED: ICD-10-CM

## 2018-02-06 DIAGNOSIS — I10 ESSENTIAL (PRIMARY) HYPERTENSION: ICD-10-CM

## 2018-02-06 DIAGNOSIS — G47.33 OBSTRUCTIVE SLEEP APNEA (ADULT) (PEDIATRIC): ICD-10-CM

## 2018-02-06 DIAGNOSIS — R07.89 OTHER CHEST PAIN: ICD-10-CM

## 2018-02-06 DIAGNOSIS — E87.5 HYPERKALEMIA: ICD-10-CM

## 2018-02-06 DIAGNOSIS — I25.10 ATHEROSCLEROTIC HEART DISEASE OF NATIVE CORONARY ARTERY WITHOUT ANGINA PECTORIS: ICD-10-CM

## 2018-02-06 DIAGNOSIS — Z85.850 PERSONAL HISTORY OF MALIGNANT NEOPLASM OF THYROID: ICD-10-CM

## 2018-02-06 LAB
ANION GAP SERPL CALC-SCNC: 8 MMOL/L — SIGNIFICANT CHANGE UP (ref 7–14)
BASOPHILS # BLD AUTO: 0.03 K/UL — SIGNIFICANT CHANGE UP (ref 0–0.2)
BASOPHILS NFR BLD AUTO: 0.4 % — SIGNIFICANT CHANGE UP (ref 0–1)
BUN SERPL-MCNC: 14 MG/DL — SIGNIFICANT CHANGE UP (ref 10–20)
CALCIUM SERPL-MCNC: 8.5 MG/DL — SIGNIFICANT CHANGE UP (ref 8.5–10.1)
CHLORIDE SERPL-SCNC: 101 MMOL/L — SIGNIFICANT CHANGE UP (ref 98–110)
CO2 SERPL-SCNC: 30 MMOL/L — SIGNIFICANT CHANGE UP (ref 17–32)
CREAT SERPL-MCNC: 1.3 MG/DL — SIGNIFICANT CHANGE UP (ref 0.7–1.5)
EOSINOPHIL # BLD AUTO: 0.09 K/UL — SIGNIFICANT CHANGE UP (ref 0–0.7)
EOSINOPHIL NFR BLD AUTO: 1.1 % — SIGNIFICANT CHANGE UP (ref 0–8)
GLUCOSE SERPL-MCNC: 116 MG/DL — HIGH (ref 70–110)
HCT VFR BLD CALC: 41.9 % — LOW (ref 42–52)
HGB BLD-MCNC: 13.8 G/DL — LOW (ref 14–18)
IMM GRANULOCYTES NFR BLD AUTO: 0.2 % — SIGNIFICANT CHANGE UP (ref 0.1–0.3)
LYMPHOCYTES # BLD AUTO: 1.04 K/UL — LOW (ref 1.2–3.4)
LYMPHOCYTES # BLD AUTO: 13 % — LOW (ref 20.5–51.1)
MAGNESIUM SERPL-MCNC: 2.1 MG/DL — SIGNIFICANT CHANGE UP (ref 1.8–2.4)
MCHC RBC-ENTMCNC: 28.6 PG — SIGNIFICANT CHANGE UP (ref 27–31)
MCHC RBC-ENTMCNC: 32.9 G/DL — SIGNIFICANT CHANGE UP (ref 32–37)
MCV RBC AUTO: 86.9 FL — SIGNIFICANT CHANGE UP (ref 80–94)
MONOCYTES # BLD AUTO: 0.66 K/UL — HIGH (ref 0.1–0.6)
MONOCYTES NFR BLD AUTO: 8.2 % — SIGNIFICANT CHANGE UP (ref 1.7–9.3)
NEUTROPHILS # BLD AUTO: 6.18 K/UL — SIGNIFICANT CHANGE UP (ref 1.4–6.5)
NEUTROPHILS NFR BLD AUTO: 77.1 % — HIGH (ref 42.2–75.2)
PLATELET # BLD AUTO: 184 K/UL — SIGNIFICANT CHANGE UP (ref 130–400)
POTASSIUM SERPL-MCNC: 3.5 MMOL/L — SIGNIFICANT CHANGE UP (ref 3.5–5)
POTASSIUM SERPL-SCNC: 3.5 MMOL/L — SIGNIFICANT CHANGE UP (ref 3.5–5)
RBC # BLD: 4.82 M/UL — SIGNIFICANT CHANGE UP (ref 4.7–6.1)
RBC # FLD: 14.7 % — HIGH (ref 11.5–14.5)
SODIUM SERPL-SCNC: 139 MMOL/L — SIGNIFICANT CHANGE UP (ref 135–146)
WBC # BLD: 8.02 K/UL — SIGNIFICANT CHANGE UP (ref 4.8–10.8)
WBC # FLD AUTO: 8.02 K/UL — SIGNIFICANT CHANGE UP (ref 4.8–10.8)

## 2018-02-06 RX ORDER — ASPIRIN/CALCIUM CARB/MAGNESIUM 324 MG
81 TABLET ORAL DAILY
Qty: 0 | Refills: 0 | Status: DISCONTINUED | OUTPATIENT
Start: 2018-02-06 | End: 2018-02-06

## 2018-02-06 RX ORDER — ATORVASTATIN CALCIUM 80 MG/1
40 TABLET, FILM COATED ORAL AT BEDTIME
Qty: 0 | Refills: 0 | Status: DISCONTINUED | OUTPATIENT
Start: 2018-02-06 | End: 2018-02-06

## 2018-02-06 RX ADMIN — Medication 50 MILLIGRAM(S): at 05:43

## 2018-02-06 RX ADMIN — Medication 30 MILLILITER(S): at 05:39

## 2018-02-06 RX ADMIN — Medication 30 MILLILITER(S): at 11:09

## 2018-02-06 RX ADMIN — Medication 81 MILLIGRAM(S): at 13:07

## 2018-02-06 RX ADMIN — LOSARTAN POTASSIUM 100 MILLIGRAM(S): 100 TABLET, FILM COATED ORAL at 05:43

## 2018-02-06 RX ADMIN — PANTOPRAZOLE SODIUM 40 MILLIGRAM(S): 20 TABLET, DELAYED RELEASE ORAL at 08:02

## 2018-02-06 RX ADMIN — HEPARIN SODIUM 5000 UNIT(S): 5000 INJECTION INTRAVENOUS; SUBCUTANEOUS at 05:39

## 2018-02-06 NOTE — DISCHARGE NOTE ADULT - HOSPITAL COURSE
Patient presented with atypical chest pain. He had Negative Cony scan on previous admission. He under went cardiac catheterization at this admission which did not show any coronary lesion or significant coronary artery obstruction. Patient will be discharged no ASA+B-blocker+ statin and Imdur. He will follow up as outpatient with cardiologist in 2 weeks.

## 2018-02-06 NOTE — DISCHARGE NOTE ADULT - PLAN OF CARE
resolution of symptoms angiography did not show any coronary obstruction, continue with medical management, follow up with cardiology as outpatient angiography did not show any coronary obstruction, continue with medical management, follow up with cardiology as outpatient (Dr. Khan)

## 2018-02-06 NOTE — DISCHARGE NOTE ADULT - CARE PLAN
Principal Discharge DX:	Chest pain, unspecified type  Goal:	resolution of symptoms  Assessment and plan of treatment:	angiography did not show any coronary obstruction, continue with medical management, follow up with cardiology as outpatient Principal Discharge DX:	Chest pain, unspecified type  Goal:	resolution of symptoms  Assessment and plan of treatment:	angiography did not show any coronary obstruction, continue with medical management, follow up with cardiology as outpatient (Dr. Khan)

## 2018-02-06 NOTE — PROGRESS NOTE ADULT - SUBJECTIVE AND OBJECTIVE BOX
Patient seen and examined. Pt denies chest pain.    T(F): , Max: 99 (02-06-18 @ 06:23)  HR: 63 (02-05-18 @ 21:06) (63 - 63)  BP: 111/53 (02-06-18 @ 06:23)  RR: 18 (02-06-18 @ 06:23)  SpO2: --      General: No apparent distress  Cardiovascular: S1, S2  Gastrointestinal: Soft, Non-tender, Non-distended  Respiratory: Good air entry bilaterally  Musculoskeletal: Moves all extremities. R wrist bandaged  Lymphatic: No edema  Neurologic: No gross motor deficit  Dermatologic: Skin dry                              13.8   8.02  )-----------( 184      ( 06 Feb 2018 09:18 )             41.9       02-06    139  |  101  |  14  ----------------------------<  116<H>  3.5   |  30  |  1.3    Ca    8.5      06 Feb 2018 09:18    TPro  6.9  /  Alb  3.8  /  TBili  1.1  /  DBili  0.3<H>  /  AST  19  /  ALT  15  /  AlkPhos  92  02-04

## 2018-02-06 NOTE — DISCHARGE NOTE ADULT - MEDICATION SUMMARY - MEDICATIONS TO STOP TAKING
I will STOP taking the medications listed below when I get home from the hospital:    Allegra 24 Hour Allergy oral tablet  -- 1 tab(s) by mouth once a day    allopurinol 100 mg oral tablet  -- 1 tab(s) by mouth 2 times a day    aspirin 81 mg oral tablet  -- 1 tab(s) by mouth once a day

## 2018-02-06 NOTE — DISCHARGE NOTE ADULT - MEDICATION SUMMARY - MEDICATIONS TO TAKE
I will START or STAY ON the medications listed below when I get home from the hospital:    aspirin 81 mg oral tablet  -- 1 tab(s) by mouth once a day  -- Indication: For Chest pain, unspecified type    losartan 50 mg oral tablet  -- 1 tab(s) by mouth once a day  -- Indication: For Hypertension, unspecified type    aluminum hydroxide/magnesium hydroxide/simethicone 200 mg-200 mg-20 mg oral tablet, chewable  -- 1 tab(s) chewed 4 times a day, As Needed -for heartburn   -- Chew tablets before swallowing    -- Indication: For Gastroesophageal reflux disease, esophagitis presence not specified    isosorbide mononitrate 30 mg oral tablet, extended release  -- 1 tab(s) by mouth once a day (in the morning)  -- Indication: For Chest pain, unspecified type    allopurinol 100 mg oral tablet  -- 1 tab(s) by mouth 2 times a day  -- Indication: For Hyperuricemia    Allegra 24 Hour Allergy oral tablet  -- 1 tab(s) by mouth once a day  -- Indication: For Chronic obstructive pulmonary disease, unspecified COPD type    Metoprolol Succinate ER 50 mg oral tablet, extended release  -- 1 tab(s) by mouth once a day  -- Indication: For Chest pain, unspecified type    Breo Ellipta 100 mcg-25 mcg/inh inhalation powder  -- 1 puff(s) inhaled once a day  -- Indication: For Chronic obstructive pulmonary disease, unspecified COPD type    Protonix 40 mg oral delayed release tablet  -- 40 milligram(s) by mouth 2 times a day  -- Indication: For Gastroesophageal reflux disease, esophagitis presence not specified    Synthroid 137 mcg (0.137 mg) oral tablet  -- 1 tab(s) by mouth once a day  -- Indication: For H/O thyroidectomy    Calcium 600+D oral tablet  -- 1 tab(s) by mouth 2 times a day  -- Indication: For Osteopenia

## 2018-02-06 NOTE — DISCHARGE NOTE ADULT - PATIENT PORTAL LINK FT
You can access the [facility] Patient Portal, offered by [facility], by registering with the following website: [email or web address]

## 2018-02-08 DIAGNOSIS — G47.33 OBSTRUCTIVE SLEEP APNEA (ADULT) (PEDIATRIC): ICD-10-CM

## 2018-02-08 DIAGNOSIS — Z87.891 PERSONAL HISTORY OF NICOTINE DEPENDENCE: ICD-10-CM

## 2018-02-08 DIAGNOSIS — Z85.118 PERSONAL HISTORY OF OTHER MALIGNANT NEOPLASM OF BRONCHUS AND LUNG: ICD-10-CM

## 2018-02-08 DIAGNOSIS — Z95.5 PRESENCE OF CORONARY ANGIOPLASTY IMPLANT AND GRAFT: ICD-10-CM

## 2018-02-08 DIAGNOSIS — I25.10 ATHEROSCLEROTIC HEART DISEASE OF NATIVE CORONARY ARTERY WITHOUT ANGINA PECTORIS: ICD-10-CM

## 2018-02-08 DIAGNOSIS — Z85.850 PERSONAL HISTORY OF MALIGNANT NEOPLASM OF THYROID: ICD-10-CM

## 2018-02-08 DIAGNOSIS — E78.5 HYPERLIPIDEMIA, UNSPECIFIED: ICD-10-CM

## 2018-02-08 DIAGNOSIS — I10 ESSENTIAL (PRIMARY) HYPERTENSION: ICD-10-CM

## 2018-02-08 DIAGNOSIS — J44.9 CHRONIC OBSTRUCTIVE PULMONARY DISEASE, UNSPECIFIED: ICD-10-CM

## 2018-02-08 DIAGNOSIS — R07.9 CHEST PAIN, UNSPECIFIED: ICD-10-CM

## 2018-02-08 DIAGNOSIS — K21.9 GASTRO-ESOPHAGEAL REFLUX DISEASE WITHOUT ESOPHAGITIS: ICD-10-CM

## 2018-02-08 DIAGNOSIS — Z90.2 ACQUIRED ABSENCE OF LUNG [PART OF]: ICD-10-CM

## 2018-02-08 DIAGNOSIS — E89.0 POSTPROCEDURAL HYPOTHYROIDISM: ICD-10-CM

## 2018-12-11 PROBLEM — M85.80 OTHER SPECIFIED DISORDERS OF BONE DENSITY AND STRUCTURE, UNSPECIFIED SITE: Chronic | Status: ACTIVE | Noted: 2018-02-06

## 2018-12-11 PROBLEM — I10 ESSENTIAL (PRIMARY) HYPERTENSION: Chronic | Status: ACTIVE | Noted: 2018-02-04

## 2018-12-11 PROBLEM — C73 MALIGNANT NEOPLASM OF THYROID GLAND: Chronic | Status: ACTIVE | Noted: 2018-02-04

## 2018-12-11 PROBLEM — C34.90 MALIGNANT NEOPLASM OF UNSPECIFIED PART OF UNSPECIFIED BRONCHUS OR LUNG: Chronic | Status: ACTIVE | Noted: 2018-02-04

## 2018-12-11 PROBLEM — J44.9 CHRONIC OBSTRUCTIVE PULMONARY DISEASE, UNSPECIFIED: Chronic | Status: ACTIVE | Noted: 2018-02-04

## 2018-12-11 PROBLEM — I25.10 ATHEROSCLEROTIC HEART DISEASE OF NATIVE CORONARY ARTERY WITHOUT ANGINA PECTORIS: Chronic | Status: ACTIVE | Noted: 2018-02-04

## 2018-12-11 PROBLEM — E79.0 HYPERURICEMIA WITHOUT SIGNS OF INFLAMMATORY ARTHRITIS AND TOPHACEOUS DISEASE: Chronic | Status: ACTIVE | Noted: 2018-02-06

## 2018-12-11 PROBLEM — E78.5 HYPERLIPIDEMIA, UNSPECIFIED: Chronic | Status: ACTIVE | Noted: 2018-02-04

## 2018-12-11 PROBLEM — K21.9 GASTRO-ESOPHAGEAL REFLUX DISEASE WITHOUT ESOPHAGITIS: Chronic | Status: ACTIVE | Noted: 2018-02-04

## 2019-05-05 ENCOUNTER — INPATIENT (INPATIENT)
Facility: HOSPITAL | Age: 79
LOS: 3 days | Discharge: HOME | End: 2019-05-09
Attending: INTERNAL MEDICINE | Admitting: INTERNAL MEDICINE
Payer: MEDICARE

## 2019-05-05 VITALS
OXYGEN SATURATION: 99 % | HEART RATE: 110 BPM | RESPIRATION RATE: 22 BRPM | TEMPERATURE: 98 F | WEIGHT: 225.09 LBS | SYSTOLIC BLOOD PRESSURE: 150 MMHG | HEIGHT: 73 IN | DIASTOLIC BLOOD PRESSURE: 82 MMHG

## 2019-05-05 DIAGNOSIS — E89.0 POSTPROCEDURAL HYPOTHYROIDISM: Chronic | ICD-10-CM

## 2019-05-05 DIAGNOSIS — Z90.2 ACQUIRED ABSENCE OF LUNG [PART OF]: Chronic | ICD-10-CM

## 2019-05-05 LAB
ALBUMIN SERPL ELPH-MCNC: 3.7 G/DL — SIGNIFICANT CHANGE UP (ref 3.5–5.2)
ALP SERPL-CCNC: 419 U/L — HIGH (ref 30–115)
ALT FLD-CCNC: 128 U/L — HIGH (ref 0–41)
ANION GAP SERPL CALC-SCNC: 15 MMOL/L — HIGH (ref 7–14)
APPEARANCE UR: CLEAR — SIGNIFICANT CHANGE UP
APTT BLD: 34.1 SEC — SIGNIFICANT CHANGE UP (ref 27–39.2)
AST SERPL-CCNC: 164 U/L — HIGH (ref 0–41)
BASE EXCESS BLDV CALC-SCNC: 4.1 MMOL/L — HIGH (ref -2–2)
BASE EXCESS BLDV CALC-SCNC: 4.5 MMOL/L — HIGH (ref -2–2)
BASOPHILS # BLD AUTO: 0 K/UL — SIGNIFICANT CHANGE UP (ref 0–0.2)
BASOPHILS NFR BLD AUTO: 0 % — SIGNIFICANT CHANGE UP (ref 0–1)
BILIRUB DIRECT SERPL-MCNC: 1.1 MG/DL — HIGH (ref 0–0.2)
BILIRUB INDIRECT FLD-MCNC: 1.8 MG/DL — HIGH (ref 0.2–1.2)
BILIRUB SERPL-MCNC: 2.9 MG/DL — HIGH (ref 0.2–1.2)
BILIRUB UR-MCNC: ABNORMAL
BLD GP AB SCN SERPL QL: SIGNIFICANT CHANGE UP
BUN SERPL-MCNC: 12 MG/DL — SIGNIFICANT CHANGE UP (ref 10–20)
CA-I SERPL-SCNC: 1.1 MMOL/L — LOW (ref 1.12–1.3)
CA-I SERPL-SCNC: 1.11 MMOL/L — LOW (ref 1.12–1.3)
CALCIUM SERPL-MCNC: 8.7 MG/DL — SIGNIFICANT CHANGE UP (ref 8.5–10.1)
CHLORIDE SERPL-SCNC: 100 MMOL/L — SIGNIFICANT CHANGE UP (ref 98–110)
CO2 SERPL-SCNC: 25 MMOL/L — SIGNIFICANT CHANGE UP (ref 17–32)
COLOR SPEC: SIGNIFICANT CHANGE UP
CREAT SERPL-MCNC: 1.1 MG/DL — SIGNIFICANT CHANGE UP (ref 0.7–1.5)
DIFF PNL FLD: NEGATIVE — SIGNIFICANT CHANGE UP
EOSINOPHIL # BLD AUTO: 0.06 K/UL — SIGNIFICANT CHANGE UP (ref 0–0.7)
EOSINOPHIL NFR BLD AUTO: 0.8 % — SIGNIFICANT CHANGE UP (ref 0–8)
GAS PNL BLDV: 140 MMOL/L — SIGNIFICANT CHANGE UP (ref 136–145)
GAS PNL BLDV: 143 MMOL/L — SIGNIFICANT CHANGE UP (ref 136–145)
GAS PNL BLDV: SIGNIFICANT CHANGE UP
GAS PNL BLDV: SIGNIFICANT CHANGE UP
GLUCOSE BLDC GLUCOMTR-MCNC: 128 MG/DL — HIGH (ref 70–99)
GLUCOSE SERPL-MCNC: 154 MG/DL — HIGH (ref 70–99)
GLUCOSE UR QL: NEGATIVE MG/DL — SIGNIFICANT CHANGE UP
HCO3 BLDV-SCNC: 28 MMOL/L — SIGNIFICANT CHANGE UP (ref 22–29)
HCO3 BLDV-SCNC: 30 MMOL/L — HIGH (ref 22–29)
HCT VFR BLD CALC: 40.7 % — SIGNIFICANT CHANGE UP (ref 42–52)
HCT VFR BLDA CALC: 38.1 % — SIGNIFICANT CHANGE UP (ref 34–44)
HCT VFR BLDA CALC: 41.4 % — SIGNIFICANT CHANGE UP (ref 34–44)
HGB BLD CALC-MCNC: 12.4 G/DL — LOW (ref 14–18)
HGB BLD CALC-MCNC: 13.5 G/DL — LOW (ref 14–18)
HGB BLD-MCNC: 13.3 G/DL — SIGNIFICANT CHANGE UP (ref 14–18)
IMM GRANULOCYTES NFR BLD AUTO: 0.3 % — SIGNIFICANT CHANGE UP (ref 0.1–0.3)
INR BLD: 1.19 RATIO — SIGNIFICANT CHANGE UP (ref 0.65–1.3)
KETONES UR-MCNC: NEGATIVE — SIGNIFICANT CHANGE UP
LACTATE BLDV-MCNC: 1.2 MMOL/L — SIGNIFICANT CHANGE UP (ref 0.5–1.6)
LACTATE BLDV-MCNC: 2.5 MMOL/L — HIGH (ref 0.5–1.6)
LACTATE SERPL-SCNC: 2.7 MMOL/L — HIGH (ref 0.5–2.2)
LEUKOCYTE ESTERASE UR-ACNC: NEGATIVE — SIGNIFICANT CHANGE UP
LIDOCAIN IGE QN: 40 U/L — SIGNIFICANT CHANGE UP (ref 7–60)
LYMPHOCYTES # BLD AUTO: 0.43 K/UL — SIGNIFICANT CHANGE UP (ref 1.2–3.4)
LYMPHOCYTES # BLD AUTO: 5.8 % — SIGNIFICANT CHANGE UP (ref 20.5–51.1)
MAGNESIUM SERPL-MCNC: 1.6 MG/DL — LOW (ref 1.8–2.4)
MCHC RBC-ENTMCNC: 28.5 PG — SIGNIFICANT CHANGE UP (ref 27–31)
MCHC RBC-ENTMCNC: 32.7 G/DL — SIGNIFICANT CHANGE UP (ref 32–37)
MCV RBC AUTO: 87.3 FL — SIGNIFICANT CHANGE UP (ref 80–94)
MONOCYTES # BLD AUTO: 0.38 K/UL — SIGNIFICANT CHANGE UP (ref 0.1–0.6)
MONOCYTES NFR BLD AUTO: 5.1 % — SIGNIFICANT CHANGE UP (ref 1.7–9.3)
NEUTROPHILS # BLD AUTO: 6.52 K/UL — SIGNIFICANT CHANGE UP (ref 1.4–6.5)
NEUTROPHILS NFR BLD AUTO: 88 % — SIGNIFICANT CHANGE UP (ref 42.2–75.2)
NITRITE UR-MCNC: NEGATIVE — SIGNIFICANT CHANGE UP
NRBC # BLD: 0 /100 WBCS — SIGNIFICANT CHANGE UP (ref 0–0)
PCO2 BLDV: 40 MMHG — LOW (ref 41–51)
PCO2 BLDV: 50 MMHG — SIGNIFICANT CHANGE UP (ref 41–51)
PH BLDV: 7.4 — SIGNIFICANT CHANGE UP (ref 7.26–7.43)
PH BLDV: 7.46 — HIGH (ref 7.26–7.43)
PH UR: 7 — SIGNIFICANT CHANGE UP (ref 5–8)
PLATELET # BLD AUTO: 119 K/UL — SIGNIFICANT CHANGE UP (ref 130–400)
PO2 BLDV: 24 MMHG — SIGNIFICANT CHANGE UP (ref 20–40)
PO2 BLDV: 43 MMHG — HIGH (ref 20–40)
POTASSIUM BLDV-SCNC: 3.3 MMOL/L — SIGNIFICANT CHANGE UP (ref 3.3–5.6)
POTASSIUM BLDV-SCNC: 4 MMOL/L — SIGNIFICANT CHANGE UP (ref 3.3–5.6)
POTASSIUM SERPL-MCNC: 6.8 MMOL/L — CRITICAL HIGH (ref 3.5–5)
POTASSIUM SERPL-SCNC: 6.8 MMOL/L — CRITICAL HIGH (ref 3.5–5)
PROT SERPL-MCNC: 7.2 G/DL — SIGNIFICANT CHANGE UP (ref 6–8)
PROT UR-MCNC: NEGATIVE MG/DL — SIGNIFICANT CHANGE UP
PROTHROM AB SERPL-ACNC: 13.7 SEC — HIGH (ref 9.95–12.87)
RBC # BLD: 4.66 M/UL — SIGNIFICANT CHANGE UP (ref 4.7–6.1)
RBC # FLD: 14.4 % — SIGNIFICANT CHANGE UP (ref 11.5–14.5)
SAO2 % BLDV: 39 % — SIGNIFICANT CHANGE UP
SAO2 % BLDV: 82 % — SIGNIFICANT CHANGE UP
SODIUM SERPL-SCNC: 140 MMOL/L — SIGNIFICANT CHANGE UP (ref 135–146)
SP GR SPEC: 1.01 — SIGNIFICANT CHANGE UP (ref 1.01–1.03)
TROPONIN T SERPL-MCNC: <0.01 NG/ML — SIGNIFICANT CHANGE UP
TYPE + AB SCN PNL BLD: SIGNIFICANT CHANGE UP
UROBILINOGEN FLD QL: 4 MG/DL (ref 0.2–0.2)
WBC # BLD: 7.41 K/UL — SIGNIFICANT CHANGE UP (ref 4.8–10.8)
WBC # FLD AUTO: 7.41 K/UL — SIGNIFICANT CHANGE UP (ref 4.8–10.8)

## 2019-05-05 PROCEDURE — 71045 X-RAY EXAM CHEST 1 VIEW: CPT | Mod: 26

## 2019-05-05 PROCEDURE — 93010 ELECTROCARDIOGRAM REPORT: CPT

## 2019-05-05 PROCEDURE — 76705 ECHO EXAM OF ABDOMEN: CPT | Mod: 26

## 2019-05-05 PROCEDURE — 74177 CT ABD & PELVIS W/CONTRAST: CPT | Mod: 26

## 2019-05-05 PROCEDURE — 99222 1ST HOSP IP/OBS MODERATE 55: CPT

## 2019-05-05 PROCEDURE — 99291 CRITICAL CARE FIRST HOUR: CPT

## 2019-05-05 RX ORDER — ACETAMINOPHEN 500 MG
650 TABLET ORAL EVERY 6 HOURS
Qty: 0 | Refills: 0 | Status: DISCONTINUED | OUTPATIENT
Start: 2019-05-05 | End: 2019-05-09

## 2019-05-05 RX ORDER — SODIUM CHLORIDE 9 MG/ML
1000 INJECTION, SOLUTION INTRAVENOUS
Qty: 0 | Refills: 0 | Status: DISCONTINUED | OUTPATIENT
Start: 2019-05-05 | End: 2019-05-05

## 2019-05-05 RX ORDER — ENOXAPARIN SODIUM 100 MG/ML
40 INJECTION SUBCUTANEOUS EVERY 24 HOURS
Qty: 0 | Refills: 0 | Status: DISCONTINUED | OUTPATIENT
Start: 2019-05-05 | End: 2019-05-09

## 2019-05-05 RX ORDER — FEXOFENADINE HCL 30 MG
1 TABLET ORAL
Qty: 0 | Refills: 0 | COMMUNITY

## 2019-05-05 RX ORDER — LEVOTHYROXINE SODIUM 125 MCG
1 TABLET ORAL
Qty: 0 | Refills: 0 | COMMUNITY

## 2019-05-05 RX ORDER — SODIUM CHLORIDE 9 MG/ML
3 INJECTION INTRAMUSCULAR; INTRAVENOUS; SUBCUTANEOUS ONCE
Qty: 0 | Refills: 0 | Status: COMPLETED | OUTPATIENT
Start: 2019-05-05 | End: 2019-05-05

## 2019-05-05 RX ORDER — PIPERACILLIN AND TAZOBACTAM 4; .5 G/20ML; G/20ML
3.38 INJECTION, POWDER, LYOPHILIZED, FOR SOLUTION INTRAVENOUS ONCE
Qty: 0 | Refills: 0 | Status: COMPLETED | OUTPATIENT
Start: 2019-05-05 | End: 2019-05-05

## 2019-05-05 RX ORDER — CHLORHEXIDINE GLUCONATE 213 G/1000ML
1 SOLUTION TOPICAL
Qty: 0 | Refills: 0 | Status: DISCONTINUED | OUTPATIENT
Start: 2019-05-05 | End: 2019-05-09

## 2019-05-05 RX ORDER — ONDANSETRON 8 MG/1
4 TABLET, FILM COATED ORAL ONCE
Qty: 0 | Refills: 0 | Status: COMPLETED | OUTPATIENT
Start: 2019-05-05 | End: 2019-05-05

## 2019-05-05 RX ORDER — FAMOTIDINE 10 MG/ML
20 INJECTION INTRAVENOUS ONCE
Qty: 0 | Refills: 0 | Status: COMPLETED | OUTPATIENT
Start: 2019-05-05 | End: 2019-05-05

## 2019-05-05 RX ORDER — PANTOPRAZOLE SODIUM 20 MG/1
40 TABLET, DELAYED RELEASE ORAL
Qty: 0 | Refills: 0 | Status: DISCONTINUED | OUTPATIENT
Start: 2019-05-05 | End: 2019-05-09

## 2019-05-05 RX ORDER — MAGNESIUM SULFATE 500 MG/ML
2 VIAL (ML) INJECTION ONCE
Qty: 0 | Refills: 0 | Status: COMPLETED | OUTPATIENT
Start: 2019-05-05 | End: 2019-05-05

## 2019-05-05 RX ORDER — METRONIDAZOLE 500 MG
500 TABLET ORAL EVERY 8 HOURS
Qty: 0 | Refills: 0 | Status: DISCONTINUED | OUTPATIENT
Start: 2019-05-05 | End: 2019-05-09

## 2019-05-05 RX ORDER — ACETAMINOPHEN 500 MG
650 TABLET ORAL ONCE
Qty: 0 | Refills: 0 | Status: COMPLETED | OUTPATIENT
Start: 2019-05-05 | End: 2019-05-05

## 2019-05-05 RX ORDER — SODIUM CHLORIDE 9 MG/ML
1000 INJECTION, SOLUTION INTRAVENOUS ONCE
Qty: 0 | Refills: 0 | Status: COMPLETED | OUTPATIENT
Start: 2019-05-05 | End: 2019-05-05

## 2019-05-05 RX ORDER — CEFEPIME 1 G/1
1000 INJECTION, POWDER, FOR SOLUTION INTRAMUSCULAR; INTRAVENOUS EVERY 8 HOURS
Qty: 0 | Refills: 0 | Status: DISCONTINUED | OUTPATIENT
Start: 2019-05-05 | End: 2019-05-09

## 2019-05-05 RX ORDER — SODIUM CHLORIDE 9 MG/ML
1000 INJECTION, SOLUTION INTRAVENOUS
Qty: 0 | Refills: 0 | Status: DISCONTINUED | OUTPATIENT
Start: 2019-05-05 | End: 2019-05-07

## 2019-05-05 RX ORDER — LOSARTAN POTASSIUM 100 MG/1
1 TABLET, FILM COATED ORAL
Qty: 0 | Refills: 0 | COMMUNITY

## 2019-05-05 RX ORDER — MONTELUKAST 4 MG/1
10 TABLET, CHEWABLE ORAL AT BEDTIME
Qty: 0 | Refills: 0 | Status: DISCONTINUED | OUTPATIENT
Start: 2019-05-05 | End: 2019-05-09

## 2019-05-05 RX ADMIN — SODIUM CHLORIDE 1000 MILLILITER(S): 9 INJECTION, SOLUTION INTRAVENOUS at 20:56

## 2019-05-05 RX ADMIN — SODIUM CHLORIDE 3 MILLILITER(S): 9 INJECTION INTRAMUSCULAR; INTRAVENOUS; SUBCUTANEOUS at 18:22

## 2019-05-05 RX ADMIN — Medication 50 GRAM(S): at 18:23

## 2019-05-05 RX ADMIN — SODIUM CHLORIDE 75 MILLILITER(S): 9 INJECTION, SOLUTION INTRAVENOUS at 23:24

## 2019-05-05 RX ADMIN — MONTELUKAST 10 MILLIGRAM(S): 4 TABLET, CHEWABLE ORAL at 22:58

## 2019-05-05 RX ADMIN — SODIUM CHLORIDE 3 MILLILITER(S): 9 INJECTION INTRAMUSCULAR; INTRAVENOUS; SUBCUTANEOUS at 16:36

## 2019-05-05 RX ADMIN — SODIUM CHLORIDE 1000 MILLILITER(S): 9 INJECTION, SOLUTION INTRAVENOUS at 16:35

## 2019-05-05 RX ADMIN — CEFEPIME 100 MILLIGRAM(S): 1 INJECTION, POWDER, FOR SOLUTION INTRAMUSCULAR; INTRAVENOUS at 22:58

## 2019-05-05 RX ADMIN — Medication 100 MILLIGRAM(S): at 22:40

## 2019-05-05 RX ADMIN — PIPERACILLIN AND TAZOBACTAM 200 GRAM(S): 4; .5 INJECTION, POWDER, LYOPHILIZED, FOR SOLUTION INTRAVENOUS at 20:56

## 2019-05-05 RX ADMIN — Medication 650 MILLIGRAM(S): at 19:14

## 2019-05-05 RX ADMIN — FAMOTIDINE 20 MILLIGRAM(S): 10 INJECTION INTRAVENOUS at 16:35

## 2019-05-05 RX ADMIN — ONDANSETRON 4 MILLIGRAM(S): 8 TABLET, FILM COATED ORAL at 16:35

## 2019-05-05 NOTE — H&P ADULT - NSHPOUTPATIENTPROVIDERS_GEN_ALL_CORE
PMD Dr Andrade  Cardio Dr Filemon Rodriguez [facility]  Pulm Dr Vitaliy headley

## 2019-05-05 NOTE — ED PROVIDER NOTE - ATTENDING CONTRIBUTION TO CARE
77 y/o M, with hx as above here for eval of abdominal pain. pt reports has had extensive workup for epigastirc abdomianl pain both with gi and cardio. today pt has episode of sharp epigastric pain associated with vomitting. no fevers at home. on exam pt well appearing s1s2 ctab soft nt/nd.

## 2019-05-05 NOTE — ED ADULT NURSE NOTE - PMH
Chronic obstructive pulmonary disease, unspecified COPD type    Coronary artery disease involving native heart, angina presence unspecified, unspecified vessel or lesion type    Dyslipidemia    Gastroesophageal reflux disease, esophagitis presence not specified    Hypertension, unspecified type    Hyperuricemia    Lung cancer    ALTAF (obstructive sleep apnea)    Osteopenia    Thyroid cancer

## 2019-05-05 NOTE — H&P ADULT - NSHPLABSRESULTS_GEN_ALL_CORE
13.3   7.41  )-----------( 119      ( 05 May 2019 16:28 )             40.7     05-05    140  |  100  |  12  ----------------------------<  154<H>  6.8<HH>   |  25  |  1.1    Ca    8.7      05 May 2019 16:28  Mg     1.6     05-05    TPro  7.2  /  Alb  3.7  /  TBili  2.9<H>  /  DBili  1.1<H>  /  AST  164<H>  /  ALT  128<H>  /  AlkPhos  419<H>  05-05    < from: US Abdomen Limited (05.05.19 @ 21:08) >    No sonographic evidence of acute pathology.    < end of copied text >    < from: CT Abdomen and Pelvis w/ IV Cont (05.05.19 @ 18:44) >    No CT evidence of acute intra-abdominal or pelvic pathology.    Mild dilatation of the distal CBD up to 1.0 cm without evidence of   choledocholithiasis. Further evaluation with nonemergent MRI/MRCP can be   obtained for further evaluation if clinically warranted.

## 2019-05-05 NOTE — ED ADULT NURSE NOTE - OBJECTIVE STATEMENT
Patient reports he has had intermittent mid sternal chest pain since february. last episode was today and lasted 4 hours, soon after he began to have chills, fatigue, vomiting and fevers (highest 102).

## 2019-05-05 NOTE — PATIENT PROFILE ADULT - VISION (WITH CORRECTIVE LENSES IF THE PATIENT USUALLY WEARS THEM):
patient is wearing glasses/Partially impaired: cannot see medication labels or newsprint, but can see obstacles in path, and the surrounding layout; can count fingers at arm's length

## 2019-05-05 NOTE — ED PROVIDER NOTE - OBJECTIVE STATEMENT
77 y/o M, h/o CAD s/p PCI, lung cancer s/p LL lobectomy, thyroid cancer s/p thyroidectomy, HTN, HLD, COPD/ALTAF, presents with intermittent epigastric pain   denies fever, chills, abd pain, sob, 77 y/o M, h/o CAD s/p PCI, lung cancer s/p LL lobectomy, thyroid cancer s/p thyroidectomy, HTN, HLD, COPD/ALTAF, presents with intermittent epigastric pain.   denies fever, chills, abd pain, sob, 77 y/o M, h/o CAD s/p PCI, lung cancer s/p LL lung lobectomy, thyroid cancer s/p thyroidectomy, HTN, HLD, COPD/ALTAF, presents with intermittent epigastric pain.   denies fever, chills, abd pain, sob, 77 y/o M, h/o CAD s/p PCI, lung cancer s/p LL lung lobectomy, thyroid cancer s/p thyroidectomy, HTN, HLD, COPD/ALTAF, presents with intermittent sharp epigastric pain onset about 3-4 days ago. pt states he has had a history of similar pain over the past year and has had negative cardiac and GI work ups (negative dissection studies, negative PCI, negative CT abd/pelvis). pt is followed by multiple specialists (Vitaliy headley for pulm, lena for cardioilogy, and Dr. Boudreaux for GI). pt states he gets this pain intermittently every few months. states he feels sharp epigastric pain first then vomits and feels better for a few hours. pt states he will get the same pain again a few hours later. denies fever, chills, abd pain, sob, let swelling, changes in BM, urinary symptoms. 79 y/o M, h/o diverticulosis, CAD s/p PCI, lung cancer s/p LL lung lobectomy, thyroid cancer s/p thyroidectomy, HTN, HLD, COPD/ALTAF, presents with intermittent sharp epigastric pain onset about 3-4 days ago. pt states he has had a history of similar pain over the past year and has had negative cardiac and GI work ups (negative dissection studies, negative PCI, negative CT abd/pelvis). pt is followed by multiple specialists (Vitaliy headley for pulm, lena for cardioilogy, and Dr. Boudreaux for GI). pt states he gets this pain intermittently every few months. states he feels sharp epigastric pain first then vomits and feels better for a few hours. pt states he will get the same pain again a few hours later. pt notes minimal relief with aciphex and dicyclominedenies fever, chills, abd pain, sob, let swelling, changes in BM, urinary symptoms.

## 2019-05-05 NOTE — H&P ADULT - NSICDXPASTMEDICALHX_GEN_ALL_CORE_FT
PAST MEDICAL HISTORY:  Chronic obstructive pulmonary disease, unspecified COPD type     Coronary artery disease involving native heart, angina presence unspecified, unspecified vessel or lesion type     Dyslipidemia     Gastroesophageal reflux disease, esophagitis presence not specified     Hypertension, unspecified type     Hyperuricemia     Lung cancer     ALTAF (obstructive sleep apnea)     Osteopenia     Thyroid cancer

## 2019-05-05 NOTE — ED PROVIDER NOTE - CARE PLAN
Principal Discharge DX:	Right upper quadrant pain  Secondary Diagnosis:	Sepsis  Secondary Diagnosis:	Elevated liver enzymes Principal Discharge DX:	Right upper quadrant pain  Assessment and plan of treatment:	r/o cholangitis  Secondary Diagnosis:	Sepsis  Secondary Diagnosis:	Elevated liver enzymes Principal Discharge DX:	Ascending cholangitis  Assessment and plan of treatment:	r/o cholangitis  Secondary Diagnosis:	Sepsis  Secondary Diagnosis:	Elevated liver enzymes

## 2019-05-05 NOTE — CONSULT NOTE ADULT - SUBJECTIVE AND OBJECTIVE BOX
HERIBERTO RODRIGUEZ [id number]  78y Male    HPI:  78M, PMHx CAD, s/p stents, lung CA, s/p LL lobectomy, thyroid cancer, s/p thyroidectomy, HTN, HLD, COPD/ALTAF, GERD, presents w/ abdominal pain. Pt states since feb he has been experiencing burning chest pain, had extensive cardiac and GI workup (EGD: negative), for the past 3 weeks the pain became worse, and today felt sharp epigastric pain associated with vomiting, radiating to his back. Pt denies feeling febrile at home, no diarrhea, constipation, no headache, dysuria, musculoskeletal pain.         PAST MEDICAL & SURGICAL HISTORY:  Osteopenia  Hyperuricemia  Lung cancer  Coronary artery disease involving native heart, angina presence unspecified, unspecified vessel or lesion type  ALTAF (obstructive sleep apnea)  Chronic obstructive pulmonary disease, unspecified COPD type  Dyslipidemia  Thyroid cancer  Hypertension, unspecified type  Gastroesophageal reflux disease, esophagitis presence not specified  H/O thyroidectomy  S/P lobectomy of lung        MEDICATIONS  (STANDING):  lactated ringers. 1000 milliLiter(s) (1000 mL/Hr) IV Continuous <Continuous>    MEDICATIONS  (PRN):      Allergies    No Known Allergies    Intolerances        REVIEW OF SYSTEMS    [ ] A ten-point review of systems was otherwise negative except as noted.  [ ] Due to altered mental status/intubation, subjective information were not able to be obtained from the patient. History was obtained, to the extent possible, from review of the chart and collateral sources of information.      Vital Signs Last 24 Hrs  T(C): 36.7 (05 May 2019 20:40), Max: 40 (05 May 2019 16:36)  T(F): 98.1 (05 May 2019 20:40), Max: 104 (05 May 2019 16:36)  HR: 105 (05 May 2019 20:40) (98 - 110)  BP: 123/64 (05 May 2019 20:40) (123/64 - 150/82)  BP(mean): --  RR: 20 (05 May 2019 20:40) (20 - 22)  SpO2: 96% (05 May 2019 20:40) (96% - 99%)    PHYSICAL EXAM:  GENERAL: NAD, well-appearing  CHEST/LUNG: Clear to auscultation bilaterally  HEART: Regular rate and rhythm  ABDOMEN: Soft, Nontender, Nondistended;   EXTREMITIES:  No clubbing, cyanosis, or edema      LABS:  Labs:  CAPILLARY BLOOD GLUCOSE                              13.3   7.41  )-----------( 119      ( 05 May 2019 16:28 )             40.7       Auto Neutrophil %: 88 % (05-05-19 @ 16:28)  Auto Immature Granulocyte %: 0.3 % (05-05-19 @ 16:28)    05-05    140  |  100  |  12  ----------------------------<  154<H>  6.8<HH>   |  25  |  1.1      Calcium, Total Serum: 8.7 mg/dL (05-05-19 @ 16:28)      LFTs:             7.2  | 2.9  | 164      ------------------[419     ( 05 May 2019 16:28 )  3.7  | 1.1  | 128         Lipase:40     Amylase:x         Blood Gas Venous - Lactate: 1.2 mmoL/L (05-05-19 @ 20:47)  Blood Gas Venous - Lactate: 2.5 mmoL/L (05-05-19 @ 16:34)  Lactate, Blood: 2.7 mmol/L (05-05-19 @ 16:28)      Coags:     13.70  ----< 1.19    ( 05 May 2019 20:25 )     34.1        CARDIAC MARKERS ( 05 May 2019 16:28 )  x     / <0.01 ng/mL / x     / x     / x              RADIOLOGY & ADDITIONAL STUDIES:  < from: CT Abdomen and Pelvis w/ IV Cont (05.05.19 @ 18:44) >  IMPRESSION:      No CT evidence of acute intra-abdominal or pelvic pathology.    Mild dilatation of the distal CBD up to 1.0 cm without evidence of   choledocholithiasis. Further evaluation with nonemergent MRI/MRCP can be   obtained for further evaluation if clinically warranted.  < end of copied text > HERIBERTO RODRIGUEZ [id number]  78y Male    HPI:  78M, PMHx CAD, s/p stents, lung CA, s/p LL lobectomy, thyroid cancer, s/p thyroidectomy, HTN, HLD, COPD/ALTAF, GERD, presents w/ abdominal pain. Pt states since feb he has been experiencing burning chest pain, had extensive cardiac and GI workup (EGD: negative), for the past 3 weeks the pain became worse, and today felt sharp epigastric pain associated with vomiting, radiating to his back. Pt denies feeling febrile at home, no diarrhea, constipation, no headache, dysuria, musculoskeletal pain.         PAST MEDICAL & SURGICAL HISTORY:  Osteopenia  Hyperuricemia  Lung cancer  Coronary artery disease involving native heart, angina presence unspecified, unspecified vessel or lesion type  ALTAF (obstructive sleep apnea)  Chronic obstructive pulmonary disease, unspecified COPD type  Dyslipidemia  Thyroid cancer  Hypertension, unspecified type  Gastroesophageal reflux disease, esophagitis presence not specified  H/O thyroidectomy  S/P lobectomy of lung        MEDICATIONS  (STANDING):  lactated ringers. 1000 milliLiter(s) (1000 mL/Hr) IV Continuous <Continuous>    MEDICATIONS  (PRN):      Allergies    No Known Allergies    Intolerances        REVIEW OF SYSTEMS    [ ] A ten-point review of systems was otherwise negative except as noted.  [ ] Due to altered mental status/intubation, subjective information were not able to be obtained from the patient. History was obtained, to the extent possible, from review of the chart and collateral sources of information.      Vital Signs Last 24 Hrs  T(C): 36.7 (05 May 2019 20:40), Max: 40 (05 May 2019 16:36)  T(F): 98.1 (05 May 2019 20:40), Max: 104 (05 May 2019 16:36)  HR: 105 (05 May 2019 20:40) (98 - 110)  BP: 123/64 (05 May 2019 20:40) (123/64 - 150/82)  BP(mean): --  RR: 20 (05 May 2019 20:40) (20 - 22)  SpO2: 96% (05 May 2019 20:40) (96% - 99%)    PHYSICAL EXAM:  GENERAL: NAD, well-appearing  CHEST/LUNG: Clear to auscultation bilaterally  HEART: Regular rate and rhythm  ABDOMEN: Soft, Nontender, Nondistended;   EXTREMITIES:  No clubbing, cyanosis, or edema      LABS:  Labs:  CAPILLARY BLOOD GLUCOSE                              13.3   7.41  )-----------( 119      ( 05 May 2019 16:28 )             40.7       Auto Neutrophil %: 88 % (05-05-19 @ 16:28)  Auto Immature Granulocyte %: 0.3 % (05-05-19 @ 16:28)    05-05    140  |  100  |  12  ----------------------------<  154<H>  6.8<HH>   |  25  |  1.1      Calcium, Total Serum: 8.7 mg/dL (05-05-19 @ 16:28)      LFTs:             7.2  | 2.9  | 164      ------------------[419     ( 05 May 2019 16:28 )  3.7  | 1.1  | 128         Lipase:40     Amylase:x         Blood Gas Venous - Lactate: 1.2 mmoL/L (05-05-19 @ 20:47)  Blood Gas Venous - Lactate: 2.5 mmoL/L (05-05-19 @ 16:34)  Lactate, Blood: 2.7 mmol/L (05-05-19 @ 16:28)      Coags:     13.70  ----< 1.19    ( 05 May 2019 20:25 )     34.1        CARDIAC MARKERS ( 05 May 2019 16:28 )  x     / <0.01 ng/mL / x     / x     / x              RADIOLOGY & ADDITIONAL STUDIES:  < from: CT Abdomen and Pelvis w/ IV Cont (05.05.19 @ 18:44) >  IMPRESSION:      No CT evidence of acute intra-abdominal or pelvic pathology.    Mild dilatation of the distal CBD up to 1.0 cm without evidence of   choledocholithiasis. Further evaluation with nonemergent MRI/MRCP can be   obtained for further evaluation if clinically warranted.  < end of copied text >.    < from: US Abdomen Limited (05.05.19 @ 21:08) >  GALLBLADDER/BILIARY TREE:  No evidence of cholelithiasis. No wall   thickening or pericholecystic fluid.  Negative sonographic Mims's sign.   No intrahepatic biliary ductal dilatation. The common bile duct measures   5 mm, which is normal.     IMPRESSION:    No sonographic evidence of acute pathology.    < end of copied text >

## 2019-05-05 NOTE — ED PROVIDER NOTE - PHYSICAL EXAMINATION
VITAL SIGNS: I have reviewed nursing notes and confirm.  CONSTITUTIONAL: Well-developed; well-nourished; in no acute distress.  SKIN: Skin exam is warm and dry, <2 sec cap refill  HEAD: Normocephalic; atraumatic.  EYES: PERRL, EOM intact; normal conjunctiva.  ENT: MMM; airway clear.   NECK: Supple; non tender.  CARD: tachycardic, 2+ dp pulses  RESP: No wheezes, rales or rhonchi, speaking in full sentences  ABD: soft mildly TTP to epigastrum    EXT: Normal ROM. No edema.  NEURO: Alert, oriented. Grossly unremarkable. No focal deficits.  PSYCH: Cooperative, appropriate.

## 2019-05-05 NOTE — CONSULT NOTE ADULT - ASSESSMENT
ASSESSMENT/PLAN:   78M, w/ multiple comorbidities, presents w/ sharp epigastric pain, radiating to back, associated w/ vomiting. CT showed mild dilatation distal CBD 1.0cm w/o choledocholithiasis, elevated LFTs, febrile to 104.   - will need MRCP   - f/u GI recommendations   - admit to medicine   - Will follow ASSESSMENT/PLAN:   78M, w/ multiple comorbidities, presents w/ sharp epigastric pain, radiating to back, associated w/ vomiting. CT showed mild dilatation distal CBD 1.0cm w/o choledocholithiasis, elevated LFTs, febrile to 104.   - will need MRCP   - f/u GI recommendations   - admit to medicine   - Will follow     Senior Resident Note  77yo with possible cholangitis - choledoco vs malignancy   trend lfts  mrcp  gi consult  no surgical intervention at this time  Pt seen and examined  Above note has been reviewed and edited  Plan d/w patient and Dr Ion Coker

## 2019-05-05 NOTE — H&P ADULT - NSHPPHYSICALEXAM_GEN_ALL_CORE
Jaundiced Ichteric sclera  GBBS  RRR, normal S1S2  Soft abdomen diffuse non specific tenderness, +BS  No LE edema  AAox3, Motor power and sensorium intact

## 2019-05-05 NOTE — H&P ADULT - NSICDXFAMILYHX_GEN_ALL_CORE_FT
FAMILY HISTORY:  Father  Still living? No  Family history of myocardial infarction, Age at diagnosis: Age Unknown    Mother  Still living? Unknown  Family history of heart disease, Age at diagnosis: Age Unknown    Sibling  Still living? No  Family history of prostate cancer, Age at diagnosis: Age Unknown

## 2019-05-05 NOTE — H&P ADULT - HISTORY OF PRESENT ILLNESS
77 yo M PMH of Lung ca s/p lobectomy, Thyroid cancer, hx of COPD, CAD s/p PCI, HTN, DLD presenting with sharp abdominal and chest pain mid epigastric, not associated with food, waxing and weaning that started in February and the patient had no explanation for. He reports having a normal endoscopy and scotoscopy in 2018. He reports changed in his color of urine to very dark orange color. No naren colored stools no pruritis. Weight loss of 35 pounds in the last few months. In the ED patient had elevated Liver enzymes, Febrile and had tachycardia. CT showed CBD dilation. Given IV antibiotics and admitted for further evaluation.

## 2019-05-05 NOTE — H&P ADULT - ASSESSMENT
Assessment:  ·	Acute cholangitis/Sepsis r/o malignancy  ·	Hx of CAD, COPD, Lung ca, Thyroid cancer s/p surgery with hypothyroidism, HTN, DLD    Plan:  ·	NPO. Hemodynamic monitoring  ·	IV hydration. IV antibiotics  ·	GI evaluation. MRCP w/wo contrast??  ·	Possible ERCP. Holding aspirin. Will consult cardio  ·	Continue levothyroxine. Holding ACEI, BB in setting of sepsis  ·	DVT and Stress ulcer Ppx  ·	OOB to chair  ·	Full code

## 2019-05-05 NOTE — ED PROVIDER NOTE - CLINICAL SUMMARY MEDICAL DECISION MAKING FREE TEXT BOX
abd pain, found to have elevated lft, fever. distal dilation of cbd. ? cholangitis. broad spectrum abx, ivf, blood culutres. pt admitted to icu

## 2019-05-05 NOTE — PATIENT PROFILE ADULT - ABILITY TO HEAR (WITH HEARING AID OR HEARING APPLIANCE IF NORMALLY USED):
Mildly to Moderately Impaired: difficulty hearing in some environments or speaker may need to increase volume or speak distinctly/left ear impaired

## 2019-05-05 NOTE — ED ADULT TRIAGE NOTE - CHIEF COMPLAINT QUOTE
vomiting, fever and chills with chest pain going on for a few weeks now on and off, pt vomiting in triage yellow

## 2019-05-05 NOTE — ED PROVIDER NOTE - PROGRESS NOTE DETAILS
pt developed a fever (104), pt states he is feeling better. Ct scan concerning for dilated CBD. spoke with gage, surgery resident. aware of pt and consult. paged GI. pt currently meets SIRS criteria. , T 104F. Sepsis suspected at this time. while in ed fever developed, dilated cbd on ct concerning for colangitis. gi paged surgery aware. 30cc/kg ideal body weight bolus given. surgery team at bedside. spoke with Gi fellow Dr. Singh. wants pt to be kept NPO. MRCP tomorrow. pt approved for the ICU. spoke with ICU resident. aware of pending RUQ ultrasound 2nd line placed repeat lactate pending

## 2019-05-05 NOTE — ED PROVIDER NOTE - NS ED ROS FT
Review of Systems:  CONSTITUTIONAL: no fever  EYES: no photophobia, no blurred vision  ENT: no ear pain, no nasal discharge  RESPIRATORY: no shortness of breath, no cough  CARDIAC: + chest pain, no palpitations  GI: + abd pain, + nausea, + vomiting, no diarrhea, no constipation,   : no dysuria; no hematuria,   MUSCULOSKELETAL: no joint paint  NEUROLOGIC: no headache,   PSYCH: no anxiety, non suicidal, non homicidal, no hallucination, no depression

## 2019-05-05 NOTE — ED PROVIDER NOTE - CRITICAL CARE PROVIDED
documentation/interpretation of diagnostic studies/consult w/ pt's family directly relating to pts condition/additional history taking/consultation with other physicians/direct patient care (not related to procedure)

## 2019-05-06 LAB
-  K. PNEUMONIAE GROUP: SIGNIFICANT CHANGE UP
ALBUMIN SERPL ELPH-MCNC: 3.4 G/DL — LOW (ref 3.5–5.2)
ALBUMIN SERPL ELPH-MCNC: 3.6 G/DL — SIGNIFICANT CHANGE UP (ref 3.5–5.2)
ALP SERPL-CCNC: 396 U/L — HIGH (ref 30–115)
ALP SERPL-CCNC: 464 U/L — HIGH (ref 30–115)
ALT FLD-CCNC: 164 U/L — HIGH (ref 0–41)
ALT FLD-CCNC: 182 U/L — HIGH (ref 0–41)
ANION GAP SERPL CALC-SCNC: 12 MMOL/L — SIGNIFICANT CHANGE UP (ref 7–14)
ANION GAP SERPL CALC-SCNC: 12 MMOL/L — SIGNIFICANT CHANGE UP (ref 7–14)
APTT BLD: 35.1 SEC — SIGNIFICANT CHANGE UP (ref 27–39.2)
AST SERPL-CCNC: 175 U/L — HIGH (ref 0–41)
AST SERPL-CCNC: 189 U/L — HIGH (ref 0–41)
BASOPHILS # BLD AUTO: 0.01 K/UL — SIGNIFICANT CHANGE UP (ref 0–0.2)
BASOPHILS # BLD AUTO: 0.02 K/UL — SIGNIFICANT CHANGE UP (ref 0–0.2)
BASOPHILS NFR BLD AUTO: 0.1 % — SIGNIFICANT CHANGE UP (ref 0–1)
BASOPHILS NFR BLD AUTO: 0.3 % — SIGNIFICANT CHANGE UP (ref 0–1)
BILIRUB DIRECT SERPL-MCNC: 3.5 MG/DL — HIGH (ref 0–0.2)
BILIRUB INDIRECT FLD-MCNC: 1.2 MG/DL — SIGNIFICANT CHANGE UP (ref 0.2–1.2)
BILIRUB SERPL-MCNC: 4.1 MG/DL — HIGH (ref 0.2–1.2)
BILIRUB SERPL-MCNC: 4.7 MG/DL — HIGH (ref 0.2–1.2)
BUN SERPL-MCNC: 12 MG/DL — SIGNIFICANT CHANGE UP (ref 10–20)
BUN SERPL-MCNC: 12 MG/DL — SIGNIFICANT CHANGE UP (ref 10–20)
CALCIUM SERPL-MCNC: 8.4 MG/DL — LOW (ref 8.5–10.1)
CALCIUM SERPL-MCNC: 8.4 MG/DL — LOW (ref 8.5–10.1)
CHLORIDE SERPL-SCNC: 102 MMOL/L — SIGNIFICANT CHANGE UP (ref 98–110)
CHLORIDE SERPL-SCNC: 103 MMOL/L — SIGNIFICANT CHANGE UP (ref 98–110)
CO2 SERPL-SCNC: 28 MMOL/L — SIGNIFICANT CHANGE UP (ref 17–32)
CO2 SERPL-SCNC: 28 MMOL/L — SIGNIFICANT CHANGE UP (ref 17–32)
CREAT SERPL-MCNC: 0.9 MG/DL — SIGNIFICANT CHANGE UP (ref 0.7–1.5)
CREAT SERPL-MCNC: 1 MG/DL — SIGNIFICANT CHANGE UP (ref 0.7–1.5)
CULTURE RESULTS: SIGNIFICANT CHANGE UP
EOSINOPHIL # BLD AUTO: 0.05 K/UL — SIGNIFICANT CHANGE UP (ref 0–0.7)
EOSINOPHIL # BLD AUTO: 0.1 K/UL — SIGNIFICANT CHANGE UP (ref 0–0.7)
EOSINOPHIL NFR BLD AUTO: 0.7 % — SIGNIFICANT CHANGE UP (ref 0–8)
EOSINOPHIL NFR BLD AUTO: 1.4 % — SIGNIFICANT CHANGE UP (ref 0–8)
GGT SERPL-CCNC: 372 U/L — HIGH (ref 1–40)
GLUCOSE SERPL-MCNC: 106 MG/DL — HIGH (ref 70–99)
GLUCOSE SERPL-MCNC: 91 MG/DL — SIGNIFICANT CHANGE UP (ref 70–99)
GRAM STN FLD: SIGNIFICANT CHANGE UP
HCT VFR BLD CALC: 36.4 % — LOW (ref 42–52)
HCT VFR BLD CALC: 38.5 % — LOW (ref 42–52)
HGB BLD-MCNC: 11.6 G/DL — LOW (ref 14–18)
HGB BLD-MCNC: 12.2 G/DL — LOW (ref 14–18)
IMM GRANULOCYTES NFR BLD AUTO: 0.1 % — SIGNIFICANT CHANGE UP (ref 0.1–0.3)
IMM GRANULOCYTES NFR BLD AUTO: 0.4 % — HIGH (ref 0.1–0.3)
INR BLD: 1.22 RATIO — SIGNIFICANT CHANGE UP (ref 0.65–1.3)
LACTATE SERPL-SCNC: 1.4 MMOL/L — SIGNIFICANT CHANGE UP (ref 0.5–2.2)
LYMPHOCYTES # BLD AUTO: 0.62 K/UL — LOW (ref 1.2–3.4)
LYMPHOCYTES # BLD AUTO: 0.72 K/UL — LOW (ref 1.2–3.4)
LYMPHOCYTES # BLD AUTO: 8.6 % — LOW (ref 20.5–51.1)
LYMPHOCYTES # BLD AUTO: 9.8 % — LOW (ref 20.5–51.1)
MAGNESIUM SERPL-MCNC: 2 MG/DL — SIGNIFICANT CHANGE UP (ref 1.8–2.4)
MAGNESIUM SERPL-MCNC: 2 MG/DL — SIGNIFICANT CHANGE UP (ref 1.8–2.4)
MCHC RBC-ENTMCNC: 27.9 PG — SIGNIFICANT CHANGE UP (ref 27–31)
MCHC RBC-ENTMCNC: 28 PG — SIGNIFICANT CHANGE UP (ref 27–31)
MCHC RBC-ENTMCNC: 31.7 G/DL — LOW (ref 32–37)
MCHC RBC-ENTMCNC: 31.9 G/DL — LOW (ref 32–37)
MCV RBC AUTO: 87.9 FL — SIGNIFICANT CHANGE UP (ref 80–94)
MCV RBC AUTO: 88.1 FL — SIGNIFICANT CHANGE UP (ref 80–94)
METHOD TYPE: SIGNIFICANT CHANGE UP
MONOCYTES # BLD AUTO: 0.58 K/UL — SIGNIFICANT CHANGE UP (ref 0.1–0.6)
MONOCYTES # BLD AUTO: 0.61 K/UL — HIGH (ref 0.1–0.6)
MONOCYTES NFR BLD AUTO: 8.1 % — SIGNIFICANT CHANGE UP (ref 1.7–9.3)
MONOCYTES NFR BLD AUTO: 8.3 % — SIGNIFICANT CHANGE UP (ref 1.7–9.3)
NEUTROPHILS # BLD AUTO: 5.83 K/UL — SIGNIFICANT CHANGE UP (ref 1.4–6.5)
NEUTROPHILS # BLD AUTO: 5.91 K/UL — SIGNIFICANT CHANGE UP (ref 1.4–6.5)
NEUTROPHILS NFR BLD AUTO: 79.8 % — HIGH (ref 42.2–75.2)
NEUTROPHILS NFR BLD AUTO: 82.4 % — HIGH (ref 42.2–75.2)
NRBC # BLD: 0 /100 WBCS — SIGNIFICANT CHANGE UP (ref 0–0)
NRBC # BLD: 0 /100 WBCS — SIGNIFICANT CHANGE UP (ref 0–0)
PHOSPHATE SERPL-MCNC: 2.4 MG/DL — SIGNIFICANT CHANGE UP (ref 2.1–4.9)
PLATELET # BLD AUTO: 115 K/UL — LOW (ref 130–400)
PLATELET # BLD AUTO: 149 K/UL — SIGNIFICANT CHANGE UP (ref 130–400)
POTASSIUM SERPL-MCNC: 3.8 MMOL/L — SIGNIFICANT CHANGE UP (ref 3.5–5)
POTASSIUM SERPL-MCNC: 3.8 MMOL/L — SIGNIFICANT CHANGE UP (ref 3.5–5)
POTASSIUM SERPL-SCNC: 3.8 MMOL/L — SIGNIFICANT CHANGE UP (ref 3.5–5)
POTASSIUM SERPL-SCNC: 3.8 MMOL/L — SIGNIFICANT CHANGE UP (ref 3.5–5)
PROT SERPL-MCNC: 5.7 G/DL — LOW (ref 6–8)
PROT SERPL-MCNC: 6.3 G/DL — SIGNIFICANT CHANGE UP (ref 6–8)
PROTHROM AB SERPL-ACNC: 14 SEC — HIGH (ref 9.95–12.87)
RBC # BLD: 4.14 M/UL — LOW (ref 4.7–6.1)
RBC # BLD: 4.37 M/UL — LOW (ref 4.7–6.1)
RBC # FLD: 14.5 % — SIGNIFICANT CHANGE UP (ref 11.5–14.5)
RBC # FLD: 14.6 % — HIGH (ref 11.5–14.5)
SODIUM SERPL-SCNC: 142 MMOL/L — SIGNIFICANT CHANGE UP (ref 135–146)
SODIUM SERPL-SCNC: 143 MMOL/L — SIGNIFICANT CHANGE UP (ref 135–146)
SPECIMEN SOURCE: SIGNIFICANT CHANGE UP
SPECIMEN SOURCE: SIGNIFICANT CHANGE UP
TROPONIN T SERPL-MCNC: <0.01 NG/ML — SIGNIFICANT CHANGE UP
TYPE + AB SCN PNL BLD: SIGNIFICANT CHANGE UP
WBC # BLD: 7.18 K/UL — SIGNIFICANT CHANGE UP (ref 4.8–10.8)
WBC # BLD: 7.31 K/UL — SIGNIFICANT CHANGE UP (ref 4.8–10.8)
WBC # FLD AUTO: 7.18 K/UL — SIGNIFICANT CHANGE UP (ref 4.8–10.8)
WBC # FLD AUTO: 7.31 K/UL — SIGNIFICANT CHANGE UP (ref 4.8–10.8)

## 2019-05-06 PROCEDURE — 99231 SBSQ HOSP IP/OBS SF/LOW 25: CPT

## 2019-05-06 PROCEDURE — 71045 X-RAY EXAM CHEST 1 VIEW: CPT | Mod: 26

## 2019-05-06 PROCEDURE — 74181 MRI ABDOMEN W/O CONTRAST: CPT | Mod: 26

## 2019-05-06 RX ORDER — LEVOTHYROXINE SODIUM 125 MCG
150 TABLET ORAL
Qty: 0 | Refills: 0 | Status: DISCONTINUED | OUTPATIENT
Start: 2019-05-06 | End: 2019-05-09

## 2019-05-06 RX ORDER — LEVOTHYROXINE SODIUM 125 MCG
137 TABLET ORAL
Qty: 0 | Refills: 0 | Status: DISCONTINUED | OUTPATIENT
Start: 2019-05-06 | End: 2019-05-09

## 2019-05-06 RX ADMIN — CEFEPIME 100 MILLIGRAM(S): 1 INJECTION, POWDER, FOR SOLUTION INTRAMUSCULAR; INTRAVENOUS at 06:13

## 2019-05-06 RX ADMIN — PANTOPRAZOLE SODIUM 40 MILLIGRAM(S): 20 TABLET, DELAYED RELEASE ORAL at 06:14

## 2019-05-06 RX ADMIN — MONTELUKAST 10 MILLIGRAM(S): 4 TABLET, CHEWABLE ORAL at 21:13

## 2019-05-06 RX ADMIN — Medication 650 MILLIGRAM(S): at 08:23

## 2019-05-06 RX ADMIN — Medication 100 MILLIGRAM(S): at 15:23

## 2019-05-06 RX ADMIN — ENOXAPARIN SODIUM 40 MILLIGRAM(S): 100 INJECTION SUBCUTANEOUS at 06:14

## 2019-05-06 RX ADMIN — CHLORHEXIDINE GLUCONATE 1 APPLICATION(S): 213 SOLUTION TOPICAL at 06:15

## 2019-05-06 RX ADMIN — CEFEPIME 100 MILLIGRAM(S): 1 INJECTION, POWDER, FOR SOLUTION INTRAMUSCULAR; INTRAVENOUS at 15:23

## 2019-05-06 RX ADMIN — CEFEPIME 100 MILLIGRAM(S): 1 INJECTION, POWDER, FOR SOLUTION INTRAMUSCULAR; INTRAVENOUS at 21:12

## 2019-05-06 RX ADMIN — Medication 137 MICROGRAM(S): at 08:22

## 2019-05-06 RX ADMIN — Medication 650 MILLIGRAM(S): at 06:45

## 2019-05-06 RX ADMIN — Medication 100 MILLIGRAM(S): at 06:13

## 2019-05-06 RX ADMIN — Medication 100 MILLIGRAM(S): at 22:13

## 2019-05-06 NOTE — CONSULT NOTE ADULT - SUBJECTIVE AND OBJECTIVE BOX
Patient is a 77 y/o with PMHx of CAD ( previous PCI with intervention), ALTAF, COPD, Lung CA ( Had prior Lobectomy) , thyroid Ca ( had Thyroidectomy) , HLD, that presented to [facility] with abdominal pain, chest   pain. Patient notes over the past year he has been getting these bouts of upper abdominal/ lower chest pain. Today when he points to area of pain he notes left lower chest ( not over RUQ). Pain when it occurs is episodic and usually last a few hours. Pain when it occurs is sharp and 8-9/10. He notes no known aggravating factors. With abdominal pain he develops nausea and occasionally has had episodes of non-bloody emesis. Over the last year he had two previous hospital presentations ( [facility] as well as [facility]- Abdominal U/S and CT scans reviewed from [facility] and [facility] on prior admissions) as well as outpatient follow up with Dr Rodriguez ( EGD and colonoscopy largely unrevealing).  Currently he feels better since presentation but is concerned  about the frequency and severity of attacks.     Vital Signs:  T(F): 100.7   HR: 72  BP: 129/48  RR: 21  SpO2: 92%  Physical Exam  Gen: NAD  HEENT: NC/AT  Cardio: S1/S2  Resp: CTA B/L  Abdomen: Soft, ND/NT  Neuro: AAOx3,  Extremities: FROM x 4                          11.6   7.18  )-----------( 115      ( 06 May 2019 04:45 )             36.4   05-06    143  |  103  |  12  ----------------------------<  106<H>  3.8   |  28  |  1.0    Ca    8.4<L>      06 May 2019 04:45  Mg     2.0     05-06    TPro  5.7<L>  /  Alb  3.4<L>  /  TBili  4.1<H>  /  DBili  x   /  AST  189<H>  /  ALT  164<H>  /  AlkPhos  396<H>  05-06    Lactate 1.4    CT Abdomen and Pelvis w/ IV Cont 05.05.19   IMPRESSION:        No CT evidence of acute intra-abdominal or pelvic pathology.    Mild dilatation of the distal CBD up to 1.0 cm without evidence of   choledocholithiasis. Further evaluation with nonemergent MRI/MRCP can be   obtained for further evaluation if clinically warranted.

## 2019-05-06 NOTE — PROGRESS NOTE ADULT - SUBJECTIVE AND OBJECTIVE BOX
GENERAL SURGERY PROGRESS NOTE     HERIBERTO RODRIGUEZ  78y  Male  Hospital day :1d    OVERNIGHT EVENTS:  No acute events overnight. Pt with mild abd pain.     T(F): 98.9 (19 @ 08:00), Max: 104 (19 @ 16:36)  HR: 72 (19 @ 08:00) (70 - 110)  BP: 125/65 (19 @ 08:00) (108/51 - 150/82)  RR: 20 (19 @ 08:00) (20 - 41)  SpO2: 96% (19 @ 08:00) (92% - 99%)    DIET/FLUIDS: lactated ringers. 1000 milliLiter(s) IV Continuous <Continuous>       GI proph:  pantoprazole    Tablet 40 milliGRAM(s) Oral before breakfast    AC/ proph:   ABx: cefepime   IVPB 1000 milliGRAM(s) IV Intermittent every 8 hours  metroNIDAZOLE  IVPB 500 milliGRAM(s) IV Intermittent every 8 hours      PHYSICAL EXAM:  GENERAL: NAD, well-appearing  CHEST/LUNG: no obvious increased wob   ABDOMEN: Soft, Nontender, Nondistended;         LABS    POCT Blood Glucose.: 128 mg/dL (05 May 2019 21:40)                          11.6   7.18  )-----------( 115      ( 06 May 2019 04:45 )             36.4       Auto Immature Granulocyte %: 0.1 % (19 @ 04:45)  Auto Neutrophil %: 82.4 % (19 @ 04:45)  Auto Neutrophil %: 88 % (19 @ 16:28)  Auto Immature Granulocyte %: 0.3 % (19 @ 16:28)        143  |  103  |  12  ----------------------------<  106<H>  3.8   |  28  |  1.0      Calcium, Total Serum: 8.4 mg/dL (19 @ 04:45)      LFTs:             5.7  | 4.1  | 189      ------------------[396     ( 06 May 2019 04:45 )  3.4  | x    | 164         Lipase:x      Amylase:x         Lactate, Blood: 1.4 mmol/L (19 @ 04:45)  Blood Gas Venous - Lactate: 1.2 mmoL/L (19 @ 20:47)  Blood Gas Venous - Lactate: 2.5 mmoL/L (19 @ 16:34)  Lactate, Blood: 2.7 mmol/L (19 @ 16:28)      Coags:     14.00  ----< 1.22    ( 06 May 2019 04:45 )     35.1        CARDIAC MARKERS ( 06 May 2019 04:45 )  x     / <0.01 ng/mL / x     / x     / x      CARDIAC MARKERS ( 05 May 2019 16:28 )  x     / <0.01 ng/mL / x     / x     / x              Urinalysis Basic - ( 05 May 2019 21:00 )    Color: Dark Yellow / Appearance: Clear / S.015 / pH: x  Gluc: x / Ketone: Negative  / Bili: Small / Urobili: 4.0 mg/dL   Blood: x / Protein: Negative mg/dL / Nitrite: Negative   Leuk Esterase: Negative / RBC: x / WBC x   Sq Epi: x / Non Sq Epi: x / Bacteria: x

## 2019-05-06 NOTE — CONSULT NOTE ADULT - ASSESSMENT
Patient is a 79 y/o with PMHx of CAD ( previous PCI with intervention), ALTAF, COPD, Lung CA ( Had prior Lobectomy) , thyroid Ca ( had Thyroidectomy) , HLD, that presented to [facility] with abdominal pain, chest   pain. Patient has elevation of T-Cuco along with ductal dilation noted on CT imaging. Clinical presentation is odd for Choledocholithiasis or Cholangitis but would investigate further . Reassuring is that his WBC, and lactate are doing well and clinically he is well appearing.     Abnormal Imaging of Bile ducts/ Abnormal Liver function studies  - Ductal Size discrepancy between CT and Abdominal U/S ( on abdominal U/S was 4 mm 2/19 and is now 5mm)  CT notes distal CBD at 11mm  - Agree with ABX coverage  - MRCP  - Will follow

## 2019-05-06 NOTE — CONSULT NOTE ADULT - ASSESSMENT
Impression:  Cholangitis.  HO lung Cancer      PLAN:    CNS: Pain control.     HEENT: Oral care    PULMONARY:  HOB @ 45 degrees.     CARDIOVASCULAR: Continue with Iv fluid for now.     GI: Keep npo for now.    RENAL:  Follow up lytes.  Correct as needed    INFECTIOUS DISEASE: Follow up cultures. Continue with antibiotics for now.     HEMATOLOGICAL:  DVT prophylaxis.    ENDOCRINE:  Follow up FS.  Insulin protocol if needed.    MUSCULOSKELETAL: Out of bed to chair. Impression:    Cholangitis?/ sepsis/ no pressors  HO lung Cancer      PLAN:    CNS: Pain control.     HEENT: Oral care    PULMONARY:  HOB @ 45 degrees.     CARDIOVASCULAR: Continue with Iv fluid for now.     GI: Keep npo for now. GI F/UP, trend LFT    RENAL:  Follow up lytes.  Correct as needed    INFECTIOUS DISEASE: Follow up cultures. Continue with antibiotics for now.     HEMATOLOGICAL:  DVT prophylaxis.    ENDOCRINE:  Follow up FS.  Insulin protocol if needed.    MUSCULOSKELETAL: Out of bed to chair.    IF no intervention/ transfer to floor

## 2019-05-06 NOTE — CONSULT NOTE ADULT - SUBJECTIVE AND OBJECTIVE BOX
Patient is a 78y old  Male who presents with a chief complaint of chest and Abdominal pain (05 May 2019 21:31)      HPI:  79 yo M PMH of Lung ca s/p lobectomy, Thyroid cancer, hx of COPD, CAD s/p PCI, HTN, DLD presenting with sharp abdominal and chest pain mid epigastric, not associated with food, waxing and weaning that started in February and the patient had no explanation for. He reports having a normal endoscopy and scotoscopy in 2018. He reports changed in his color of urine to very dark orange color. No naren colored stools no pruritis. Weight loss of 35 pounds in the last few months. In the ED patient had elevated Liver enzymes, Febrile and had tachycardia. CT showed CBD dilation. Given IV antibiotics and admitted for further evaluation. (05 May 2019 21:31)      PAST MEDICAL & SURGICAL HISTORY:  Osteopenia  Hyperuricemia  Lung cancer  Coronary artery disease involving native heart, angina presence unspecified, unspecified vessel or lesion type  ALTAF (obstructive sleep apnea)  Chronic obstructive pulmonary disease, unspecified COPD type  Dyslipidemia  Thyroid cancer  Hypertension, unspecified type  Gastroesophageal reflux disease, esophagitis presence not specified  H/O thyroidectomy  S/P lobectomy of lung    FAMILY HISTORY:  Family history of prostate cancer (Sibling)  Family history of myocardial infarction (Father)  Family history of heart disease (Mother)  :  No known cardiovacular family hisotry     ROS:  See HPI     Allergies    No Known Allergies    PHYSICAL EXAM    ICU Vital Signs Last 24 Hrs  T(C): 38.2 (06 May 2019 06:00), Max: 40 (05 May 2019 16:36)  T(F): 100.7 (06 May 2019 06:00), Max: 104 (05 May 2019 16:36)  HR: 72 (06 May 2019 06:00) (70 - 110)  BP: 129/48 (06 May 2019 06:00) (108/51 - 150/82)  BP(mean): 72 (06 May 2019 06:00) (72 - 87)  ABP: --  ABP(mean): --  RR: 21 (06 May 2019 06:00) (20 - 41)  SpO2: 92% (06 May 2019 06:00) (92% - 99%)      General: In NAD   HEENT:  FRANCOIS              Lymphatic system: No cervical LN   Lungs: Bilateral BS  Cardiovascular: Regular  Gastrointestinal: Soft, Positive BS  Musculoskeletal: No clubbing.  Moves all extremities.  Full range of motion   Skin: Warm.  Intact  Neurological: No motor or sensory deficit       19 @ 07:01  -  19 @ 07:00  --------------------------------------------------------  IN:    IV PiggyBack: 300 mL    lactated ringers.: 675 mL    Oral Fluid: 120 mL  Total IN: 1095 mL    OUT:    Voided: 450 mL  Total OUT: 450 mL    Total NET: 645 mL      LABS:                          11.6   7.18  )-----------( 115      ( 06 May 2019 04:45 )             36.4                                                   143  |  103  |  12  ----------------------------<  106<H>  3.8   |  28  |  1.0    Ca    8.4<L>      06 May 2019 04:45  Mg     2.0         TPro  5.7<L>  /  Alb  3.4<L>  /  TBili  4.1<H>  /  DBili  x   /  AST  189<H>  /  ALT  164<H>  /  AlkPhos  396<H>        PT/INR - ( 06 May 2019 04:45 )   PT: 14.00 sec;   INR: 1.22 ratio         PTT - ( 06 May 2019 04:45 )  PTT:35.1 sec                                       Urinalysis Basic - ( 05 May 2019 21:00 )    Color: Dark Yellow / Appearance: Clear / S.015 / pH: x  Gluc: x / Ketone: Negative  / Bili: Small / Urobili: 4.0 mg/dL   Blood: x / Protein: Negative mg/dL / Nitrite: Negative   Leuk Esterase: Negative / RBC: x / WBC x   Sq Epi: x / Non Sq Epi: x / Bacteria: x        CARDIAC MARKERS ( 06 May 2019 04:45 )  x     / <0.01 ng/mL / x     / x     / x      CARDIAC MARKERS ( 05 May 2019 16:28 )  x     / <0.01 ng/mL / x     / x     / x                                                LIVER FUNCTIONS - ( 06 May 2019 04:45 )  Alb: 3.4 g/dL / Pro: 5.7 g/dL / ALK PHOS: 396 U/L / ALT: 164 U/L / AST: 189 U/L / GGT: 372 U/L                                                           X-Rays                                                                                     ECHO    MEDICATIONS  (STANDING):  cefepime   IVPB 1000 milliGRAM(s) IV Intermittent every 8 hours  chlorhexidine 4% Liquid 1 Application(s) Topical <User Schedule>  enoxaparin Injectable 40 milliGRAM(s) SubCutaneous every 24 hours  lactated ringers. 1000 milliLiter(s) (75 mL/Hr) IV Continuous <Continuous>  levothyroxine 137 MICROGram(s) Oral <User Schedule>  levothyroxine 150 MICROGram(s) Oral <User Schedule>  metroNIDAZOLE  IVPB 500 milliGRAM(s) IV Intermittent every 8 hours  montelukast 10 milliGRAM(s) Oral at bedtime  pantoprazole    Tablet 40 milliGRAM(s) Oral before breakfast    MEDICATIONS  (PRN):  acetaminophen   Tablet .. 650 milliGRAM(s) Oral every 6 hours PRN Temp greater or equal to 38C (100.4F) Patient is a 78y old  Male who presents with a chief complaint of chest and Abdominal pain (05 May 2019 21:31)      HPI:  77 yo M PMH of Lung ca s/p lobectomy, Thyroid cancer, hx of COPD, CAD s/p PCI, HTN, DLD presenting with sharp abdominal and chest pain mid epigastric, not associated with food, waxing and weaning that started in February and the patient had no explanation for. He reports having a normal endoscopy and colonoscopy in 2018. He reports changed in his color of urine to very dark orange color. No naren colored stools no pruritis. Weight loss of 35 pounds in the last few months. In the ED patient had elevated Liver enzymes, Febrile and had tachycardia. CT showed CBD dilation. Given IV antibiotics and admitted for further evaluation. (05 May 2019 21:31) admitted for cholangitis, feels better, no pressors still on IVF      PAST MEDICAL & SURGICAL HISTORY:  Osteopenia  Hyperuricemia  Lung cancer  Coronary artery disease involving native heart, angina presence unspecified, unspecified vessel or lesion type  ALTAF (obstructive sleep apnea)  Chronic obstructive pulmonary disease, unspecified COPD type  Dyslipidemia  Thyroid cancer  Hypertension, unspecified type  Gastroesophageal reflux disease, esophagitis presence not specified  H/O thyroidectomy  S/P lobectomy of lung    FAMILY HISTORY:  Family history of prostate cancer (Sibling)  Family history of myocardial infarction (Father)  Family history of heart disease (Mother)  :  No known cardiovacular family hisotry     ROS:  See HPI     Allergies    No Known Allergies    PHYSICAL EXAM    ICU Vital Signs Last 24 Hrs  T(C): 38.2 (06 May 2019 06:00), Max: 40 (05 May 2019 16:36)  T(F): 100.7 (06 May 2019 06:00), Max: 104 (05 May 2019 16:36)  HR: 72 (06 May 2019 06:00) (70 - 110)  BP: 129/48 (06 May 2019 06:00) (108/51 - 150/82)  BP(mean): 72 (06 May 2019 06:00) (72 - 87)  RR: 21 (06 May 2019 06:00) (20 - 41)  SpO2: 92% (06 May 2019 06:00) (92% - 99%)      General: In NAD   HEENT:  FRANCOIS              Lymphatic system: No cervical LN   Lungs: Bilateral BS  Cardiovascular: Regular  Gastrointestinal: Soft, Positive BS, No RUQ tenderness, LLQ tenderness  Musculoskeletal: No clubbing.  Moves all extremities.  Full range of motion   Skin: Warm.  Intact  Neurological: No motor or sensory deficit       19 @ 07:01  -  19 @ 07:00  --------------------------------------------------------  IN:    IV PiggyBack: 300 mL    lactated ringers.: 675 mL    Oral Fluid: 120 mL  Total IN: 1095 mL    OUT:    Voided: 450 mL  Total OUT: 450 mL    Total NET: 645 mL      LABS:                          11.6   7.18  )-----------( 115      ( 06 May 2019 04:45 )             36.4                                                   143  |  103  |  12  ----------------------------<  106<H>  3.8   |  28  |  1.0    Ca    8.4<L>      06 May 2019 04:45  Mg     2.0         TPro  5.7<L>  /  Alb  3.4<L>  /  TBili  4.1<H>  /  DBili  x   /  AST  189<H>  /  ALT  164<H>  /  AlkPhos  396<H>        PT/INR - ( 06 May 2019 04:45 )   PT: 14.00 sec;   INR: 1.22 ratio         PTT - ( 06 May 2019 04:45 )  PTT:35.1 sec                                       Urinalysis Basic - ( 05 May 2019 21:00 )    Color: Dark Yellow / Appearance: Clear / S.015 / pH: x  Gluc: x / Ketone: Negative  / Bili: Small / Urobili: 4.0 mg/dL   Blood: x / Protein: Negative mg/dL / Nitrite: Negative   Leuk Esterase: Negative / RBC: x / WBC x   Sq Epi: x / Non Sq Epi: x / Bacteria: x        CARDIAC MARKERS ( 06 May 2019 04:45 )  x     / <0.01 ng/mL / x     / x     / x      CARDIAC MARKERS ( 05 May 2019 16:28 )  x     / <0.01 ng/mL / x     / x     / x                                                LIVER FUNCTIONS - ( 06 May 2019 04:45 )  Alb: 3.4 g/dL / Pro: 5.7 g/dL / ALK PHOS: 396 U/L / ALT: 164 U/L / AST: 189 U/L / GGT: 372 U/L                                                           X-Rays   reviewed    MEDICATIONS  (STANDING):  cefepime   IVPB 1000 milliGRAM(s) IV Intermittent every 8 hours  chlorhexidine 4% Liquid 1 Application(s) Topical <User Schedule>  enoxaparin Injectable 40 milliGRAM(s) SubCutaneous every 24 hours  lactated ringers. 1000 milliLiter(s) (75 mL/Hr) IV Continuous <Continuous>  levothyroxine 137 MICROGram(s) Oral <User Schedule>  levothyroxine 150 MICROGram(s) Oral <User Schedule>  metroNIDAZOLE  IVPB 500 milliGRAM(s) IV Intermittent every 8 hours  montelukast 10 milliGRAM(s) Oral at bedtime  pantoprazole    Tablet 40 milliGRAM(s) Oral before breakfast    MEDICATIONS  (PRN):  acetaminophen   Tablet .. 650 milliGRAM(s) Oral every 6 hours PRN Temp greater or equal to 38C (100.4F)

## 2019-05-06 NOTE — CHART NOTE - NSCHARTNOTEFT_GEN_A_CORE
CCU Transfer Note    Transfer from: CCU  Transfer to:  ( X ) Medicine    (  ) Telemetry    (  ) RCU    (  ) Palliative    (  ) Stroke Unit       MICU COURSE:  77 yo M PMH of Lung ca s/p lobectomy, Thyroid cancer, hx of COPD, CAD s/p PCI, HTN, DLD presenting with sharp abdominal and chest pain, mid epigastric, not associated with food, waxing and waning that started in February. He reports having a normal endoscopy and scotoscopy in 2018. He reports changed in his color of urine to very dark orange color. No naren colored stools no pruritis. Weight loss of 35 pounds in the last few months. In the ED patient had elevated Liver enzymes, Febrile and had tachycardia. CT showed CBD dilation. Given IV antibiotics and admitted for further evaluation.      ASSESSMENT & PLAN:         For Follow-Up:          Vital Signs Last 24 Hrs  T(C): 37.2 (06 May 2019 08:00), Max: 40 (05 May 2019 16:36)  T(F): 98.9 (06 May 2019 08:00), Max: 104 (05 May 2019 16:36)  HR: 72 (06 May 2019 08:00) (70 - 110)  BP: 125/65 (06 May 2019 08:00) (108/51 - 150/82)  BP(mean): 83 (06 May 2019 08:00) (72 - 87)  RR: 20 (06 May 2019 08:00) (20 - 41)  SpO2: 96% (06 May 2019 08:00) (92% - 99%)  I&O's Summary    05 May 2019 07:01  -  06 May 2019 07:00  --------------------------------------------------------  IN: 1095 mL / OUT: 450 mL / NET: 645 mL          MEDICATIONS  (STANDING):  cefepime   IVPB 1000 milliGRAM(s) IV Intermittent every 8 hours  chlorhexidine 4% Liquid 1 Application(s) Topical <User Schedule>  enoxaparin Injectable 40 milliGRAM(s) SubCutaneous every 24 hours  lactated ringers. 1000 milliLiter(s) (75 mL/Hr) IV Continuous <Continuous>  levothyroxine 137 MICROGram(s) Oral <User Schedule>  levothyroxine 150 MICROGram(s) Oral <User Schedule>  metroNIDAZOLE  IVPB 500 milliGRAM(s) IV Intermittent every 8 hours  montelukast 10 milliGRAM(s) Oral at bedtime  pantoprazole    Tablet 40 milliGRAM(s) Oral before breakfast    MEDICATIONS  (PRN):  acetaminophen   Tablet .. 650 milliGRAM(s) Oral every 6 hours PRN Temp greater or equal to 38C (100.4F)        LABS                                            11.6                  Neurophils% (auto):   82.4   (05-06 @ 04:45):    7.18 )-----------(115          Lymphocytes% (auto):  8.6                                           36.4                   Eosinphils% (auto):   0.7      Manual%: Neutrophils x    ; Lymphocytes x    ; Eosinophils x    ; Bands%: x    ; Blasts x                                    143    |  103    |  12                  Calcium: 8.4   / iCa: x      (05-06 @ 04:45)    ----------------------------<  106       Magnesium: 2.0                              3.8     |  28     |  1.0              Phosphorous: x        TPro  5.7    /  Alb  3.4    /  TBili  4.1    /  DBili  x      /  AST  189    /  ALT  164    /  AlkPhos  396    06 May 2019 04:45    ( 05-06 @ 04:45 )   PT: 14.00 sec;   INR: 1.22 ratio  aPTT: 35.1 sec CCU Transfer Note    Transfer from: CCU  Transfer to:  ( X ) Medicine    (  ) Telemetry    (  ) RCU    (  ) Palliative    (  ) Stroke Unit       MICU COURSE:  77 yo M PMH of Lung ca s/p lobectomy, Thyroid cancer, hx of COPD, CAD s/p PCI, HTN, DLD presenting with sharp abdominal and chest pain, mid epigastric, not associated with food, waxing and waning that started in February. He reports having a normal endoscopy in 2018. He reports change in his color of urine to very dark orange color. No naren colored stools no pruritis. Weight loss of 35 pounds in the last few months. In the ED patient had elevated Liver enzymes, Febrile and had tachycardia. CT abdomen showed CBD dilation of 1cm. Given IV antibiotics and admitted for further evaluation. GI recommended MRCP to rule out cholelithiasis. Patient is stable to be downgraded to floor with GI follow up.     Vital Signs Last 24 Hrs  T(C): 37.2 (06 May 2019 08:00), Max: 40 (05 May 2019 16:36)  T(F): 98.9 (06 May 2019 08:00), Max: 104 (05 May 2019 16:36)  HR: 72 (06 May 2019 08:00) (70 - 110)  BP: 125/65 (06 May 2019 08:00) (108/51 - 150/82)  BP(mean): 83 (06 May 2019 08:00) (72 - 87)  RR: 20 (06 May 2019 08:00) (20 - 41)  SpO2: 96% (06 May 2019 08:00) (92% - 99%)  I&O's Summary    05 May 2019 07:01  -  06 May 2019 07:00  --------------------------------------------------------  IN: 1095 mL / OUT: 450 mL / NET: 645 mL          MEDICATIONS  (STANDING):  cefepime   IVPB 1000 milliGRAM(s) IV Intermittent every 8 hours  chlorhexidine 4% Liquid 1 Application(s) Topical <User Schedule>  enoxaparin Injectable 40 milliGRAM(s) SubCutaneous every 24 hours  lactated ringers. 1000 milliLiter(s) (75 mL/Hr) IV Continuous <Continuous>  levothyroxine 137 MICROGram(s) Oral <User Schedule>  levothyroxine 150 MICROGram(s) Oral <User Schedule>  metroNIDAZOLE  IVPB 500 milliGRAM(s) IV Intermittent every 8 hours  montelukast 10 milliGRAM(s) Oral at bedtime  pantoprazole    Tablet 40 milliGRAM(s) Oral before breakfast    MEDICATIONS  (PRN):  acetaminophen   Tablet .. 650 milliGRAM(s) Oral every 6 hours PRN Temp greater or equal to 38C (100.4F)        LABS                                            11.6                  Neurophils% (auto):   82.4   (05-06 @ 04:45):    7.18 )-----------(115          Lymphocytes% (auto):  8.6                                           36.4                   Eosinphils% (auto):   0.7      Manual%: Neutrophils x    ; Lymphocytes x    ; Eosinophils x    ; Bands%: x    ; Blasts x                                    143    |  103    |  12                  Calcium: 8.4   / iCa: x      (05-06 @ 04:45)    ----------------------------<  106       Magnesium: 2.0                              3.8     |  28     |  1.0              Phosphorous: x        TPro  5.7    /  Alb  3.4    /  TBili  4.1    /  DBili  x      /  AST  189    /  ALT  164    /  AlkPhos  396    06 May 2019 04:45    ( 05-06 @ 04:45 )   PT: 14.00 sec;   INR: 1.22 ratio  aPTT: 35.1 sec        ASSESSMENT & PLAN:     #Acute cholangitis  -Fever, jaundice, elevated LFTs, US and CT abdomen showed dilated CBD  -Not in sepsis. Stable WBC, Normal HR, RR  -NPO for now  -MRCP ordered  -GI following  -Continue LR @ 75cc/hr, IV Cefepime and flagyl  -Trend LFTs    ·Hx of CAD, COPD, Lung ca,, HTN, DLD    #Thyroid cancer s/p surgery with hypothyroidism  -Continue levothyroxine.     #Hypertension  Holding ACEI, BB for now  Restart if BP trending up    ·DVT and Stress ulcer Ppx  #Acitivity: OOB to chair  ·Full code      For Follow-Up:  1. Trend LFTs  2. Follow up MRCP  3. Follow up with GI after MRCP CCU Transfer Note    Transfer from: CCU  Transfer to:  ( X ) Medicine    (  ) Telemetry    (  ) RCU    (  ) Palliative    (  ) Stroke Unit       MICU COURSE:  79 yo M PMH of Lung ca s/p lobectomy, Thyroid cancer, hx of COPD, CAD s/p PCI, HTN, DLD presenting with sharp abdominal and chest pain, mid epigastric, not associated with food, waxing and waning that started in February. He reports having a normal endoscopy in 2018. He reports change in his color of urine to very dark orange color. No naren colored stools no pruritis. Weight loss of 35 pounds in the last few months. In the ED patient had elevated Liver enzymes, Febrile and had tachycardia. CT abdomen showed CBD dilation of 1cm. Given IV antibiotics and admitted for further evaluation. GI recommended MRCP to rule out cholelithiasis. Patient is stable to be downgraded to floor with GI follow up.     Vital Signs Last 24 Hrs  T(C): 37.2 (06 May 2019 08:00), Max: 40 (05 May 2019 16:36)  T(F): 98.9 (06 May 2019 08:00), Max: 104 (05 May 2019 16:36)  HR: 72 (06 May 2019 08:00) (70 - 110)  BP: 125/65 (06 May 2019 08:00) (108/51 - 150/82)  BP(mean): 83 (06 May 2019 08:00) (72 - 87)  RR: 20 (06 May 2019 08:00) (20 - 41)  SpO2: 96% (06 May 2019 08:00) (92% - 99%)  I&O's Summary    05 May 2019 07:01  -  06 May 2019 07:00  --------------------------------------------------------  IN: 1095 mL / OUT: 450 mL / NET: 645 mL          MEDICATIONS  (STANDING):  cefepime   IVPB 1000 milliGRAM(s) IV Intermittent every 8 hours  chlorhexidine 4% Liquid 1 Application(s) Topical <User Schedule>  enoxaparin Injectable 40 milliGRAM(s) SubCutaneous every 24 hours  lactated ringers. 1000 milliLiter(s) (75 mL/Hr) IV Continuous <Continuous>  levothyroxine 137 MICROGram(s) Oral <User Schedule>  levothyroxine 150 MICROGram(s) Oral <User Schedule>  metroNIDAZOLE  IVPB 500 milliGRAM(s) IV Intermittent every 8 hours  montelukast 10 milliGRAM(s) Oral at bedtime  pantoprazole    Tablet 40 milliGRAM(s) Oral before breakfast    MEDICATIONS  (PRN):  acetaminophen   Tablet .. 650 milliGRAM(s) Oral every 6 hours PRN Temp greater or equal to 38C (100.4F)        LABS                                            11.6                  Neurophils% (auto):   82.4   (05-06 @ 04:45):    7.18 )-----------(115          Lymphocytes% (auto):  8.6                                           36.4                   Eosinphils% (auto):   0.7      Manual%: Neutrophils x    ; Lymphocytes x    ; Eosinophils x    ; Bands%: x    ; Blasts x                                    143    |  103    |  12                  Calcium: 8.4   / iCa: x      (05-06 @ 04:45)    ----------------------------<  106       Magnesium: 2.0                              3.8     |  28     |  1.0              Phosphorous: x        TPro  5.7    /  Alb  3.4    /  TBili  4.1    /  DBili  x      /  AST  189    /  ALT  164    /  AlkPhos  396    06 May 2019 04:45    ( 05-06 @ 04:45 )   PT: 14.00 sec;   INR: 1.22 ratio  aPTT: 35.1 sec        ASSESSMENT & PLAN:     #Acute cholangitis  -Fever, jaundice, elevated LFTs, US and CT abdomen showed dilated CBD  -Not in sepsis. Stable WBC, Normal HR, RR  -NPO for now  -MRCP ordered  -GI following  -Continue LR @ 75cc/hr, IV Cefepime and flagyl  -Trend LFTs    #Hx of CAD  -Hold ACEI, BB    # COPD, Lung ca s/p lobectomy  -Stable    #Thyroid cancer s/p surgery with hypothyroidism  -Continue levothyroxine.     #Hypertension  Holding ACEI, BB for now  Restart if BP trending up    #DVT ppx: lovenox   #Stress ulcer Ppx: protonix   #Activity: OOB to chair  ·Full code      For Follow-Up:  1. Trend LFTs  2. Follow up MRCP  3. Follow up with GI after MRCP

## 2019-05-07 LAB
ALBUMIN SERPL ELPH-MCNC: 3 G/DL — LOW (ref 3.5–5.2)
ALBUMIN SERPL ELPH-MCNC: 3.1 G/DL — LOW (ref 3.5–5.2)
ALP SERPL-CCNC: 374 U/L — HIGH (ref 30–115)
ALP SERPL-CCNC: 390 U/L — HIGH (ref 30–115)
ALT FLD-CCNC: 132 U/L — HIGH (ref 0–41)
ALT FLD-CCNC: 134 U/L — HIGH (ref 0–41)
ANION GAP SERPL CALC-SCNC: 12 MMOL/L — SIGNIFICANT CHANGE UP (ref 7–14)
AST SERPL-CCNC: 105 U/L — HIGH (ref 0–41)
AST SERPL-CCNC: 106 U/L — HIGH (ref 0–41)
BILIRUB DIRECT SERPL-MCNC: 1.3 MG/DL — HIGH (ref 0–0.2)
BILIRUB DIRECT SERPL-MCNC: 1.4 MG/DL — HIGH (ref 0–0.2)
BILIRUB INDIRECT FLD-MCNC: 0.7 MG/DL — SIGNIFICANT CHANGE UP (ref 0.2–1.2)
BILIRUB INDIRECT FLD-MCNC: 0.8 MG/DL — SIGNIFICANT CHANGE UP (ref 0.2–1.2)
BILIRUB SERPL-MCNC: 2.1 MG/DL — HIGH (ref 0.2–1.2)
BILIRUB SERPL-MCNC: 2.1 MG/DL — HIGH (ref 0.2–1.2)
BUN SERPL-MCNC: 12 MG/DL — SIGNIFICANT CHANGE UP (ref 10–20)
CALCIUM SERPL-MCNC: 7.6 MG/DL — LOW (ref 8.5–10.1)
CHLORIDE SERPL-SCNC: 103 MMOL/L — SIGNIFICANT CHANGE UP (ref 98–110)
CO2 SERPL-SCNC: 26 MMOL/L — SIGNIFICANT CHANGE UP (ref 17–32)
CREAT SERPL-MCNC: 0.8 MG/DL — SIGNIFICANT CHANGE UP (ref 0.7–1.5)
GLUCOSE SERPL-MCNC: 83 MG/DL — SIGNIFICANT CHANGE UP (ref 70–99)
HCT VFR BLD CALC: 33.3 % — LOW (ref 42–52)
HGB BLD-MCNC: 10.8 G/DL — LOW (ref 14–18)
MAGNESIUM SERPL-MCNC: 1.8 MG/DL — SIGNIFICANT CHANGE UP (ref 1.8–2.4)
MCHC RBC-ENTMCNC: 27.8 PG — SIGNIFICANT CHANGE UP (ref 27–31)
MCHC RBC-ENTMCNC: 32.4 G/DL — SIGNIFICANT CHANGE UP (ref 32–37)
MCV RBC AUTO: 85.6 FL — SIGNIFICANT CHANGE UP (ref 80–94)
NRBC # BLD: 0 /100 WBCS — SIGNIFICANT CHANGE UP (ref 0–0)
PLATELET # BLD AUTO: 122 K/UL — LOW (ref 130–400)
POTASSIUM SERPL-MCNC: 3.5 MMOL/L — SIGNIFICANT CHANGE UP (ref 3.5–5)
POTASSIUM SERPL-SCNC: 3.5 MMOL/L — SIGNIFICANT CHANGE UP (ref 3.5–5)
PROT SERPL-MCNC: 5.4 G/DL — LOW (ref 6–8)
PROT SERPL-MCNC: 5.4 G/DL — LOW (ref 6–8)
RBC # BLD: 3.89 M/UL — LOW (ref 4.7–6.1)
RBC # FLD: 14.5 % — SIGNIFICANT CHANGE UP (ref 11.5–14.5)
SODIUM SERPL-SCNC: 141 MMOL/L — SIGNIFICANT CHANGE UP (ref 135–146)
WBC # BLD: 4.73 K/UL — LOW (ref 4.8–10.8)
WBC # FLD AUTO: 4.73 K/UL — LOW (ref 4.8–10.8)

## 2019-05-07 PROCEDURE — 99231 SBSQ HOSP IP/OBS SF/LOW 25: CPT

## 2019-05-07 RX ORDER — METOPROLOL TARTRATE 50 MG
50 TABLET ORAL DAILY
Qty: 0 | Refills: 0 | Status: DISCONTINUED | OUTPATIENT
Start: 2019-05-07 | End: 2019-05-09

## 2019-05-07 RX ORDER — ISOSORBIDE MONONITRATE 60 MG/1
30 TABLET, EXTENDED RELEASE ORAL DAILY
Qty: 0 | Refills: 0 | Status: DISCONTINUED | OUTPATIENT
Start: 2019-05-07 | End: 2019-05-09

## 2019-05-07 RX ADMIN — Medication 100 MILLIGRAM(S): at 21:14

## 2019-05-07 RX ADMIN — Medication 100 MILLIGRAM(S): at 06:33

## 2019-05-07 RX ADMIN — CEFEPIME 100 MILLIGRAM(S): 1 INJECTION, POWDER, FOR SOLUTION INTRAMUSCULAR; INTRAVENOUS at 21:14

## 2019-05-07 RX ADMIN — MONTELUKAST 10 MILLIGRAM(S): 4 TABLET, CHEWABLE ORAL at 21:14

## 2019-05-07 RX ADMIN — Medication 100 MILLIGRAM(S): at 13:46

## 2019-05-07 RX ADMIN — Medication 50 MILLIGRAM(S): at 16:13

## 2019-05-07 RX ADMIN — CEFEPIME 100 MILLIGRAM(S): 1 INJECTION, POWDER, FOR SOLUTION INTRAMUSCULAR; INTRAVENOUS at 05:22

## 2019-05-07 RX ADMIN — Medication 150 MICROGRAM(S): at 08:10

## 2019-05-07 RX ADMIN — PANTOPRAZOLE SODIUM 40 MILLIGRAM(S): 20 TABLET, DELAYED RELEASE ORAL at 06:33

## 2019-05-07 RX ADMIN — CEFEPIME 100 MILLIGRAM(S): 1 INJECTION, POWDER, FOR SOLUTION INTRAMUSCULAR; INTRAVENOUS at 13:46

## 2019-05-07 RX ADMIN — SODIUM CHLORIDE 75 MILLILITER(S): 9 INJECTION, SOLUTION INTRAVENOUS at 13:45

## 2019-05-07 RX ADMIN — ENOXAPARIN SODIUM 40 MILLIGRAM(S): 100 INJECTION SUBCUTANEOUS at 14:38

## 2019-05-07 RX ADMIN — CHLORHEXIDINE GLUCONATE 1 APPLICATION(S): 213 SOLUTION TOPICAL at 08:11

## 2019-05-07 NOTE — PROGRESS NOTE ADULT - SUBJECTIVE AND OBJECTIVE BOX
OVERNIGHT EVENTS: events noted s/p MRCP, no pain, intermittent cough    Vital Signs Last 24 Hrs  T(C): 36.8 (07 May 2019 07:00), Max: 36.8 (07 May 2019 07:00)  T(F): 98.2 (07 May 2019 07:00), Max: 98.2 (07 May 2019 07:00)  HR: 80 (07 May 2019 07:00) (58 - 80)  BP: 158/68 (07 May 2019 07:00) (96/76 - 158/68)  BP(mean): 87 (06 May 2019 11:34) (77 - 87)  RR: 20 (07 May 2019 07:00) (17 - 22)  SpO2: 96% (06 May 2019 15:25) (92% - 96%)    PHYSICAL EXAMINATION:    GENERAL: The patient is awake and alert in no apparent distress.     HEENT: Head is normocephalic and atraumatic. Extraocular muscles are intact. Mucous membranes are moist.    NECK: Supple.    LUNGS: good air entry    HEART: Regular rate and rhythm without murmur.    ABDOMEN: Soft, nontender, and nondistended.      EXTREMITIES: Without any cyanosis, clubbing, rash, lesions or edema.    NEUROLOGIC: Grossly intact.    SKIN: No ulceration or induration present.      LABS:                        10.8   4.73  )-----------( 122      ( 07 May 2019 07:59 )             33.3         141  |  103  |  12  ----------------------------<  83  3.5   |  26  |  0.8    Ca    7.6<L>      07 May 2019 07:59  Phos  2.4       Mg     1.8         TPro  5.4<L>  /  Alb  3.1<L>  /  TBili  2.1<H>  /  DBili  1.3<H>  /  AST  105<H>  /  ALT  132<H>  /  AlkPhos  390<H>      PT/INR - ( 06 May 2019 04:45 )   PT: 14.00 sec;   INR: 1.22 ratio         PTT - ( 06 May 2019 04:45 )  PTT:35.1 sec  Urinalysis Basic - ( 05 May 2019 21:00 )    Color: Dark Yellow / Appearance: Clear / S.015 / pH: x  Gluc: x / Ketone: Negative  / Bili: Small / Urobili: 4.0 mg/dL   Blood: x / Protein: Negative mg/dL / Nitrite: Negative   Leuk Esterase: Negative / RBC: x / WBC x   Sq Epi: x / Non Sq Epi: x / Bacteria: x        CARDIAC MARKERS ( 06 May 2019 04:45 )  x     / <0.01 ng/mL / x     / x     / x      CARDIAC MARKERS ( 05 May 2019 16:28 )  x     / <0.01 ng/mL / x     / x     / x                      19 @ 07:01  -  19 @ 07:00  --------------------------------------------------------  IN: 1725 mL / OUT: 302 mL / NET: 1423 mL        MICROBIOLOGY:  Culture Results:   <10,000 CFU/mL Normal Urogenital Ching ( @ 21:00)  Culture Results:   Growth in aerobic bottle: Gram Negative Rods  "Due to technical problems, Proteus sp. will Not be reported as part of  the BCID panel until further notice"  ***Blood Panel PCR results on this specimen are available  approximately 3 hours after the Gram stain result.***  Gram stain, PCR, and/or culture results may not always  correspond due to difference in methodologies.  ************************************************************  This PCR assay was performed using [employer].  The following targets are tested for: Enterococcus,  vancomycin resistant enterococci, Listeria monocytogenes,  coagulase negative staphylococci, S. aureus,  methicillin resistant S. aureus, Streptococcus agalactiae  (Group B), S. pneumoniae, S. pyogenes (Group A),  Acinetobacter baumannii, Enterobacter cloacae, E. coli,  Klebsiella oxytoca, K. pneumoniae, Proteus sp.,  Serratia marcescens, Haemophilus influenzae,  Neisseria meningitidis, Pseudomonas aeruginosa, Candida  albicans, C. glabrata, C krusei, C parapsilosis,  C. tropicalis and the KPC resistance gene. ( @ 19:29)  Culture Results:   No growth to date. ( @ 19:29)      MEDICATIONS  (STANDING):  cefepime   IVPB 1000 milliGRAM(s) IV Intermittent every 8 hours  chlorhexidine 4% Liquid 1 Application(s) Topical <User Schedule>  enoxaparin Injectable 40 milliGRAM(s) SubCutaneous every 24 hours  lactated ringers. 1000 milliLiter(s) (75 mL/Hr) IV Continuous <Continuous>  levothyroxine 137 MICROGram(s) Oral <User Schedule>  levothyroxine 150 MICROGram(s) Oral <User Schedule>  metroNIDAZOLE  IVPB 500 milliGRAM(s) IV Intermittent every 8 hours  montelukast 10 milliGRAM(s) Oral at bedtime  pantoprazole    Tablet 40 milliGRAM(s) Oral before breakfast    MEDICATIONS  (PRN):  acetaminophen   Tablet .. 650 milliGRAM(s) Oral every 6 hours PRN Temp greater or equal to 38C (100.4F)      RADIOLOGY & ADDITIONAL STUDIES:

## 2019-05-07 NOTE — PROGRESS NOTE ADULT - SUBJECTIVE AND OBJECTIVE BOX
Patient is a 78y old  Male who presents with a chief complaint of chest and Abdominal pain (07 May 2019 09:49)    Patient was seen and examined.  C/o nausea  Denies abd pain, vomiting, diarrhea, fever, chills.  All systems reviewed positive history as mentioned above.    PAST MEDICAL & SURGICAL HISTORY:  Osteopenia  Hyperuricemia  Lung cancer  Coronary artery disease involving native heart, angina presence unspecified, unspecified vessel or lesion type  ALTAF (obstructive sleep apnea)  Chronic obstructive pulmonary disease, unspecified COPD type  Dyslipidemia  Thyroid cancer  Hypertension, unspecified type  Gastroesophageal reflux disease, esophagitis presence not specified  H/O thyroidectomy  S/P lobectomy of lung    Allergies  No Known Allergies      MEDICATIONS  (STANDING):  cefepime   IVPB 1000 milliGRAM(s) IV Intermittent every 8 hours  chlorhexidine 4% Liquid 1 Application(s) Topical <User Schedule>  enoxaparin Injectable 40 milliGRAM(s) SubCutaneous every 24 hours  lactated ringers. 1000 milliLiter(s) (75 mL/Hr) IV Continuous <Continuous>  levothyroxine 137 MICROGram(s) Oral <User Schedule>  levothyroxine 150 MICROGram(s) Oral <User Schedule>  metroNIDAZOLE  IVPB 500 milliGRAM(s) IV Intermittent every 8 hours  montelukast 10 milliGRAM(s) Oral at bedtime  pantoprazole    Tablet 40 milliGRAM(s) Oral before breakfast    MEDICATIONS  (PRN):  acetaminophen   Tablet .. 650 milliGRAM(s) Oral every 6 hours PRN Temp greater or equal to 38C (100.4F)    Vital Signs Last 24 Hrs  T(C): 36.8  T(F): 98.2  HR: 80  BP: 158/68  BP(mean): --  RR: 20  SpO2: 96%    O/E:  Awake, alert, not in distress.  HEENT: atraumatic, EOMI.  Chest: clear.  CVS: SIS2 +, no murmur.  P/A: Soft, BS+  CNS: non focal.  Ext: no edema feet.  Skin: no rash, no ulcers.  All systems reviewed positive findings as above.      POCT Blood Glucose.: 128 mg/dL (05 May 2019 21:40)                          10.8<L>  4.73<L> )-----------( 122<L>    ( 07 May 2019 07:59 )             33.3<L>                        12.2<L>  7.31  )-----------( 149      ( 06 May 2019 20:21 )             38.5<L>        141  |  103  |  12  ----------------------------<  83  3.5   |  26  |  0.8      142  |  102  |  12  ----------------------------<  91  3.8   |  28  |  0.9    Ca    7.6<L>      07 May 2019 07:59  Ca    8.4<L>      06 May 2019 20:21  Ca    8.4<L>      06 May 2019 04:45  Ca    8.7      05 May 2019 16:28  Phos  2.4       Mg     1.8         TPro  5.4<L>  /  Alb  3.0<L>  /  TBili  2.1<H>  /  DBili  1.4<H>  /  AST  106<H>  /  ALT  134<H>  /  AlkPhos  374<H>    TPro  5.4<L>  /  Alb  3.1<L>  /  TBili  2.1<H>  /  DBili  1.3<H>  /  AST  105<H>  /  ALT  132<H>  /  AlkPhos  390<H>    TPro  6.3  /  Alb  3.6  /  TBili  4.7<H>  /  DBili  3.5<H>  /  AST  175<H>  /  ALT  182<H>  /  AlkPhos  464<H>    TPro  5.7<L>  /  Alb  3.4<L>  /  TBili  4.1<H>  /  DBili  x   /  AST  189<H>  /  ALT  164<H>  /  AlkPhos  396<H>    TPro  7.2  /  Alb  3.7  /  TBili  2.9<H>  /  DBili  1.1<H>  /  AST  164<H>  /  ALT  128<H>  /  AlkPhos  419<H>        CARDIAC MARKERS ( 06 May 2019 04:45 )  x     / <0.01 ng/mL / x     / x     / x      CARDIAC MARKERS ( 05 May 2019 16:28 )  x     / <0.01 ng/mL / x     / x     / x          PT/INR - ( 06 May 2019 04:45 )   PT: 14.00 sec;   INR: 1.22 ratio         PTT - ( 06 May 2019 04:45 )  PTT:35.1 sec  Urinalysis Basic - ( 05 May 2019 21:00 )    Color: Dark Yellow / Appearance: Clear / S.015 / pH: x  Gluc: x / Ketone: Negative  / Bili: Small / Urobili: 4.0 mg/dL   Blood: x / Protein: Negative mg/dL / Nitrite: Negative   Leuk Esterase: Negative / RBC: x / WBC x   Sq Epi: x / Non Sq Epi: x / Bacteria: x        Culture - Urine (collected 05 May 2019 21:00)  Source: .Urine Clean Catch (Midstream)  Final Report (06 May 2019 23:14):    <10,000 CFU/mL Normal Urogenital Ching    Culture - Blood (collected 05 May 2019 19:29)  Source: .Blood Blood  Preliminary Report (07 May 2019 06:00):    No growth to date.    Culture - Blood (collected 05 May 2019 19:29)  Source: .Blood Blood  Gram Stain (06 May 2019 18:30):    Growth in aerobic bottle: Gram Negative Rods  Preliminary Report (06 May 2019 18:30):    Growth in aerobic bottle: Gram Negative Rods    "Due to technical problems, Proteus sp. will Not be reported as part of    the BCID panel until further notice"    ***Blood Panel PCR results on this specimen are available    approximately 3 hours after the Gram stain result.***    Gram stain, PCR, and/or culture results may not always    correspond due to difference in methodologies.    ************************************************************    This PCR assay was performed using [employer].    The following targets are tested for: Enterococcus,    vancomycin resistant enterococci, Listeria monocytogenes,    coagulase negative staphylococci, S. aureus,    methicillin resistant S. aureus, Streptococcus agalactiae    (Group B), S. pneumoniae, S. pyogenes (Group A),    Acinetobacter baumannii, Enterobacter cloacae, E. coli,    Klebsiella oxytoca, K. pneumoniae, Proteus sp.,    Serratia marcescens, Haemophilus influenzae,    Neisseria meningitidis, Pseudomonas aeruginosa, Candida    albicans, C. glabrata, C krusei, C parapsilosis,    C. tropicalis and the KPC resistance gene.  Organism: Blood Culture PCR (06 May 2019 20:00)  Organism: Blood Culture PCR (06 May 2019 20:00)      -  Klebsiella pneumoniae: Detec      Method Type: PCR

## 2019-05-07 NOTE — PROGRESS NOTE ADULT - SUBJECTIVE AND OBJECTIVE BOX
Patient is a 79 y/o with PMHx of CAD ( previous PCI with intervention), ALTAF, COPD, Lung CA ( Had prior Lobectomy) , thyroid Ca ( had Thyroidectomy) , HLD, that presented to [facility] with abdominal pain, chest   pain. Patient had initial LFT elevations. MRCP was done. Patient feels overall better.     Vital Signs:  T(F): 100.7   HR: 72  BP: 129/48  RR: 21  SpO2: 92%  Physical Exam  Gen: NAD  HEENT: NC/AT  Cardio: S1/S2  Resp: CTA B/L  Abdomen: Soft, ND/NT  Neuro: AAOx3,  Extremities: FROM x 4                          10.8   4.73  )-----------( 122      ( 07 May 2019 07:59 )             33.3   05-07    141  |  103  |  12  ----------------------------<  83  3.5   |  26  |  0.8    Ca    7.6<L>      07 May 2019 07:59  Phos  2.4     05-06  Mg     1.8     05-07    TPro  5.4<L>  /  Alb  3.0<L>  /  TBili  2.1<H>  /  DBili  1.4<H>  /  AST  106<H>  /  ALT  134<H>  /  AlkPhos  374<H>  05-07                   CT Abdomen and Pelvis w/ IV Cont 05.05.19   IMPRESSION:        No CT evidence of acute intra-abdominal or pelvic pathology.    Mild dilatation of the distal CBD up to 1.0 cm without evidence of   choledocholithiasis. Further evaluation with nonemergent MRI/MRCP can be   obtained for further evaluation if clinically warranted. Patient is a 79 y/o with PMHx of CAD ( previous PCI with intervention), ALTAF, COPD, Lung CA ( Had prior Lobectomy) , thyroid Ca ( had Thyroidectomy) , HLD, that presented to [facility] with abdominal pain, chest  pain. Patient had initial LFT elevations. MRCP was done. Patient feels overall better.     Vital Signs:  T(F): 100.7   HR: 72  BP: 129/48  RR: 21  SpO2: 92%  Physical Exam  Gen: NAD  HEENT: NC/AT  Cardio: S1/S2  Resp: CTA B/L  Abdomen: Soft, ND/NT  Neuro: AAOx3,  Extremities: FROM x 4                          10.8   4.73  )-----------( 122      ( 07 May 2019 07:59 )             33.3   05-07    141  |  103  |  12  ----------------------------<  83  3.5   |  26  |  0.8    Ca    7.6<L>      07 May 2019 07:59  Phos  2.4     05-06  Mg     1.8     05-07    TPro  5.4<L>  /  Alb  3.0<L>  /  TBili  2.1<H>  /  DBili  1.4<H>  /  AST  106<H>  /  ALT  134<H>  /  AlkPhos  374<H>  05-07                   CT Abdomen and Pelvis w/ IV Cont 05.05.19   IMPRESSION:        No CT evidence of acute intra-abdominal or pelvic pathology.    Mild dilatation of the distal CBD up to 1.0 cm without evidence of   choledocholithiasis. Further evaluation with nonemergent MRI/MRCP can be   obtained for further evaluation if clinically warranted.

## 2019-05-07 NOTE — PROGRESS NOTE ADULT - SUBJECTIVE AND OBJECTIVE BOX
Progress Note: General Surgery  Patient: HERIBERTO RODRIGUEZ , 78y (1940)Male   MRN: [id number]  Location: [address]  Visit: 19 Inpatient  Date: 19 @ 00:37    Procedure/Diagnosis: r/o cholangitis    Events/ 24h: LFTs continue to rise, MRCP prelim read negative for obstruction. Pain controlled.    Vitals: T(F): 98 (19 @ 22:52), Max: 100.7 (19 @ 06:00)  HR: 66 (19 @ 22:52)  BP: 153/69 (19 @ 22:52) (96/76 - 153/69)  RR: 20 (19 @ 22:52)  SpO2: 96% (19 @ 15:25)    In:   19 @ 07:01  -  19 @ 07:00  --------------------------------------------------------  IN: 1095 mL    19 @ 07:01  -  19 @ 00:37  --------------------------------------------------------  IN: 675 mL      Out:   19 @ 07:01  -  19 @ 07:00  --------------------------------------------------------  OUT:    Voided: 450 mL  Total OUT: 450 mL      19 @ 07:01  -  19 @ 00:37  --------------------------------------------------------  OUT:    Voided: 302 mL  Total OUT: 302 mL        Net:   19 @ 07:01  -  19 @ 07:00  --------------------------------------------------------  NET: 645 mL    19 @ 07:01  -  19 @ 00:37  --------------------------------------------------------  NET: 373 mL        Diet: Diet, NPO:   Except Medications (19 @ 21:07)    IV Fluids: lactated ringers. 1000 milliLiter(s) (75 mL/Hr) IV Continuous <Continuous>      Physical Examination:  General Appearance: NAD  HEENT: EOMI, sclera non-icteric.  Heart: RRR   Lungs: CTABL.   Abdomen:  Soft, nontender, nondistended.   MSK/Extremities: Warm & well-perfused.   Skin: Warm, dry. No jaundice.       Medications: [Standing]  cefepime   IVPB 1000 milliGRAM(s) IV Intermittent every 8 hours  chlorhexidine 4% Liquid 1 Application(s) Topical <User Schedule>  enoxaparin Injectable 40 milliGRAM(s) SubCutaneous every 24 hours  lactated ringers. 1000 milliLiter(s) (75 mL/Hr) IV Continuous <Continuous>  levothyroxine 137 MICROGram(s) Oral <User Schedule>  levothyroxine 150 MICROGram(s) Oral <User Schedule>  metroNIDAZOLE  IVPB 500 milliGRAM(s) IV Intermittent every 8 hours  montelukast 10 milliGRAM(s) Oral at bedtime  pantoprazole    Tablet 40 milliGRAM(s) Oral before breakfast    DVT Prophylaxis: enoxaparin Injectable 40 milliGRAM(s) SubCutaneous every 24 hours    GI Prophylaxis: pantoprazole    Tablet 40 milliGRAM(s) Oral before breakfast    Antibiotics: cefepime   IVPB 1000 milliGRAM(s) IV Intermittent every 8 hours  metroNIDAZOLE  IVPB 500 milliGRAM(s) IV Intermittent every 8 hours    Anticoagulation:   Medications:[PRN]  acetaminophen   Tablet .. 650 milliGRAM(s) Oral every 6 hours PRN      Labs:                        12.2   7.31  )-----------( 149      ( 06 May 2019 20:21 )             38.5     -    142  |  102  |  12  ----------------------------<  91  3.8   |  28  |  0.9    Ca    8.4<L>      06 May 2019 20:21  Phos  2.4       Mg     2.0         TPro  6.3  /  Alb  3.6  /  TBili  4.7<H>  /  DBili  3.5<H>  /  AST  175<H>  /  ALT  182<H>  /  AlkPhos  464<H>      LIVER FUNCTIONS - ( 06 May 2019 20:21 )  Alb: 3.6 g/dL / Pro: 6.3 g/dL / ALK PHOS: 464 U/L / ALT: 182 U/L / AST: 175 U/L / GGT: x           PT/INR - ( 06 May 2019 04:45 )   PT: 14.00 sec;   INR: 1.22 ratio         PTT - ( 06 May 2019 04:45 )  PTT:35.1 sec    CARDIAC MARKERS ( 06 May 2019 04:45 )  x     / <0.01 ng/mL / x     / x     / x      CARDIAC MARKERS ( 05 May 2019 16:28 )  x     / <0.01 ng/mL / x     / x     / x          Urine/Micro:    Culture - Urine (collected 05 May 2019 21:00)  Source: .Urine Clean Catch (Midstream)  Final Report (06 May 2019 23:14):    <10,000 CFU/mL Normal Urogenital Ching    Culture - Blood (collected 05 May 2019 19:29)  Source: .Blood Blood  Gram Stain (06 May 2019 18:30):    Growth in aerobic bottle: Gram Negative Rods  Preliminary Report (06 May 2019 18:30):    Growth in aerobic bottle: Gram Negative Rods    "Due to technical problems, Proteus sp. will Not be reported as part of    the BCID panel until further notice"    ***Blood Panel PCR results on this specimen are available    approximately 3 hours after the Gram stain result.***    Gram stain, PCR, and/or culture results may not always    correspond due to difference in methodologies.    ************************************************************    This PCR assay was performed using [employer].    The following targets are tested for: Enterococcus,    vancomycin resistant enterococci, Listeria monocytogenes,    coagulase negative staphylococci, S. aureus,    methicillin resistant S. aureus, Streptococcus agalactiae    (Group B), S. pneumoniae, S. pyogenes (Group A),    Acinetobacter baumannii, Enterobacter cloacae, E. coli,    Klebsiella oxytoca, K. pneumoniae, Proteus sp.,    Serratia marcescens, Haemophilus influenzae,    Neisseria meningitidis, Pseudomonas aeruginosa, Candida    albicans, C. glabrata, C krusei, C parapsilosis,    C. tropicalis and the KPC resistance gene.  Organism: Blood Culture PCR (06 May 2019 20:00)  Organism: Blood Culture PCR (06 May 2019 20:00)      Urinalysis Basic - ( 05 May 2019 21:00 )    Color: Dark Yellow / Appearance: Clear / S.015 / pH: x  Gluc: x / Ketone: Negative  / Bili: Small / Urobili: 4.0 mg/dL   Blood: x / Protein: Negative mg/dL / Nitrite: Negative   Leuk Esterase: Negative / RBC: x / WBC x   Sq Epi: x / Non Sq Epi: x / Bacteria: x        Imaging:     < from: CT Abdomen and Pelvis w/ IV Cont (19 @ 18:44) >  No CT evidence of acute intra-abdominal or pelvic pathology.    Mild dilatation of the distal CBD up to 1.0 cm without evidence of   choledocholithiasis. Further evaluation with nonemergent MRI/MRCP can be   obtained for further evaluation if clinically warranted.    < end of copied text >    < from: US Abdomen Limited (19 @ 21:08) >  No sonographic evidence of acute pathology.    < end of copied text >      Assessment:  78y Male patient admitted for r/o cholangitis    Plan:    LFTs climbing  MRCP prelim negative for obstruction  F/u official read  F/u advanced GI  DVT/GI ppx  OOBAT  IS  Pain control    Date/Time: 19 @ 00:37

## 2019-05-07 NOTE — PROGRESS NOTE ADULT - SUBJECTIVE AND OBJECTIVE BOX
SUBJECTIVE:    Patient is a 78y old Male who presents with a chief complaint of chest and Abdominal pain (07 May 2019 12:03)    Currently admitted to medicine with the primary diagnosis of Ascending cholangitis     Today is hospital day 2d. No acute overnight events. Patient downgraded from ICU to medicine. Patient reports mild left lower quadrant abdominal pain this AM, and states improvement of his jaundice. MRCP did not reveal any biliary dilation or choledocholithiasis, but did reveal a 1.7cm cystic lesion in the pancreatic body. 1 blood culture from 19 positive for Klebsiella. We will follow up with GI and continue to clinically monitor.     PAST MEDICAL & SURGICAL HISTORY  Osteopenia  Hyperuricemia  Lung cancer  Coronary artery disease involving native heart, angina presence unspecified, unspecified vessel or lesion type  ALTAF (obstructive sleep apnea)  Chronic obstructive pulmonary disease, unspecified COPD type  Dyslipidemia  Thyroid cancer  Hypertension, unspecified type  Gastroesophageal reflux disease, esophagitis presence not specified  H/O thyroidectomy  S/P lobectomy of lung    SOCIAL HISTORY:  Former smoker   Denies alcohol or illicit drug use     From (x ) home ( ) nursing home ( ) other   Ambulation status: (x ) ambulates independently ( ) ambulates with assistance ( ) ambulates with device ( ) dependent   Device: ( ) rolling walker ( ) cane     ALLERGIES:  No Known Allergies    MEDICATIONS:  STANDING MEDICATIONS  cefepime   IVPB 1000 milliGRAM(s) IV Intermittent every 8 hours  chlorhexidine 4% Liquid 1 Application(s) Topical <User Schedule>  enoxaparin Injectable 40 milliGRAM(s) SubCutaneous every 24 hours  lactated ringers. 1000 milliLiter(s) IV Continuous <Continuous>  levothyroxine 137 MICROGram(s) Oral <User Schedule>  levothyroxine 150 MICROGram(s) Oral <User Schedule>  metroNIDAZOLE  IVPB 500 milliGRAM(s) IV Intermittent every 8 hours  montelukast 10 milliGRAM(s) Oral at bedtime  pantoprazole    Tablet 40 milliGRAM(s) Oral before breakfast    PRN MEDICATIONS  acetaminophen   Tablet .. 650 milliGRAM(s) Oral every 6 hours PRN    VITALS:   T(F): 98.2  HR: 80  BP: 158/68  RR: 20  SpO2: 96%    LABS:                        10.8   4.73  )-----------( 122      ( 07 May 2019 07:59 )             33.3     05-07    141  |  103  |  12  ----------------------------<  83  3.5   |  26  |  0.8    Ca    7.6<L>      07 May 2019 07:59  Phos  2.4       Mg     1.8         TPro  5.4<L>  /  Alb  3.0<L>  /  TBili  2.1<H>  /  DBili  1.4<H>  /  AST  106<H>  /  ALT  134<H>  /  AlkPhos  374<H>      PT/INR - ( 06 May 2019 04:45 )   PT: 14.00 sec;   INR: 1.22 ratio         PTT - ( 06 May 2019 04:45 )  PTT:35.1 sec  Urinalysis Basic - ( 05 May 2019 21:00 )    Color: Dark Yellow / Appearance: Clear / S.015 / pH: x  Gluc: x / Ketone: Negative  / Bili: Small / Urobili: 4.0 mg/dL   Blood: x / Protein: Negative mg/dL / Nitrite: Negative   Leuk Esterase: Negative / RBC: x / WBC x   Sq Epi: x / Non Sq Epi: x / Bacteria: x    Culture - Urine (collected 05 May 2019 21:00)  Source: .Urine Clean Catch (Midstream)  Final Report (06 May 2019 23:14):    <10,000 CFU/mL Normal Urogenital Ching    Culture - Blood (collected 05 May 2019 19:29)  Source: .Blood Blood  Preliminary Report (07 May 2019 06:00):    No growth to date.    Culture - Blood (collected 05 May 2019 19:29)  Source: .Blood Blood  Gram Stain (06 May 2019 18:30):    Growth in aerobic bottle: Gram Negative Rods  Preliminary Report (06 May 2019 18:30):    Growth in aerobic bottle: Gram Negative Rods    "Due to technical problems, Proteus sp. will Not be reported as part of    the BCID panel until further notice"    ***Blood Panel PCR results on this specimen are available    approximately 3 hours after the Gram stain result.***    Gram stain, PCR, and/or culture results may not always    correspond due to difference in methodologies.    ************************************************************    This PCR assay was performed using [employer].    The following targets are tested for: Enterococcus,    vancomycin resistant enterococci, Listeria monocytogenes,    coagulase negative staphylococci, S. aureus,    methicillin resistant S. aureus, Streptococcus agalactiae    (Group B), S. pneumoniae, S. pyogenes (Group A),    Acinetobacter baumannii, Enterobacter cloacae, E. coli,    Klebsiella oxytoca, K. pneumoniae, Proteus sp.,    Serratia marcescens, Haemophilus influenzae,    Neisseria meningitidis, Pseudomonas aeruginosa, Candida    albicans, C. glabrata, C krusei, C parapsilosis,    C. tropicalis and the KPC resistance gene.  Organism: Blood Culture PCR (06 May 2019 20:00)  Organism: Blood Culture PCR (06 May 2019 20:00)      CARDIAC MARKERS ( 06 May 2019 04:45 )  x     / <0.01 ng/mL / x     / x     / x      CARDIAC MARKERS ( 05 May 2019 16:28 )  x     / <0.01 ng/mL / x     / x     / x        RADIOLOGY:  MRCP  IMPRESSION:  1.  No biliary ductal dilatation or evidence of choledocholithiasis.  2.   A 1.7 cm cystic structure within the pancreatic body, possibly a   serous cystadenoma.    PHYSICAL EXAM:  GEN: No acute distress, lying comfortably in bed, mild jaundice (resolving per patient)   LUNGS: Clear to auscultation bilaterally, no labored breathing   HEART: S1/S2 present. RRR.   ABD: Soft, minimal TTP in LLQ, non-distended. Bowel sounds present.   EXT: Noncyanotic, nonedematous, 2+ peripheral pulses, skin intact   NEURO: AAOX3, CN II-XII grossly intact

## 2019-05-07 NOTE — CONSULT NOTE ADULT - SUBJECTIVE AND OBJECTIVE BOX
Patient is a 78y old  Male who presents with a chief complaint of chest and Abdominal pain (07 May 2019 00:36)      HPI:  77 yo M PMH of Lung ca s/p lobectomy, Thyroid cancer, hx of COPD, CAD s/p PCI, HTN, DLD presenting with sharp abdominal and chest pain mid epigastric, not associated with food, waxing and weaning that started in February and the patient had no explanation for. He reports having a normal endoscopy and scotoscopy in 2018. He reports changed in his color of urine to very dark orange color. No naren colored stools no pruritis. Weight loss of 35 pounds in the last few months. In the ED patient had elevated Liver enzymes, Febrile and had tachycardia. CT showed CBD dilation. Given IV antibiotics and admitted for further evaluation. (05 May 2019 21:31)      PAST MEDICAL & SURGICAL HISTORY:  Osteopenia  Hyperuricemia  Lung cancer  Coronary artery disease involving native heart, angina presence unspecified, unspecified vessel or lesion type  ALTAF (obstructive sleep apnea)  Chronic obstructive pulmonary disease, unspecified COPD type  Dyslipidemia  Thyroid cancer  Hypertension, unspecified type  Gastroesophageal reflux disease, esophagitis presence not specified  H/O thyroidectomy  S/P lobectomy of lung      PREVIOUS DIAGNOSTIC TESTING:      ECHO  FINDINGS:    STRESS  FINDINGS:    CATHETERIZATION  FINDINGS:    MEDICATIONS  (STANDING):  cefepime   IVPB 1000 milliGRAM(s) IV Intermittent every 8 hours  chlorhexidine 4% Liquid 1 Application(s) Topical <User Schedule>  enoxaparin Injectable 40 milliGRAM(s) SubCutaneous every 24 hours  lactated ringers. 1000 milliLiter(s) (75 mL/Hr) IV Continuous <Continuous>  levothyroxine 137 MICROGram(s) Oral <User Schedule>  levothyroxine 150 MICROGram(s) Oral <User Schedule>  metroNIDAZOLE  IVPB 500 milliGRAM(s) IV Intermittent every 8 hours  montelukast 10 milliGRAM(s) Oral at bedtime  pantoprazole    Tablet 40 milliGRAM(s) Oral before breakfast    MEDICATIONS  (PRN):  acetaminophen   Tablet .. 650 milliGRAM(s) Oral every 6 hours PRN Temp greater or equal to 38C (100.4F)      FAMILY HISTORY:  Family history of prostate cancer (Sibling)  Family history of myocardial infarction (Father)  Family history of heart disease (Mother)      SOCIAL HISTORY:  CIGARETTES:    ALCOHOL:    REVIEW OF SYSTEMS:  CONSTITUTIONAL: No fever, weight loss, or fatigue  NECK: No pain or stiffness  RESPIRATORY: No cough, wheezing, chills or hemoptysis; No shortness of breath  CARDIOVASCULAR: No chest pain, palpitations, dizziness, or leg swelling  GASTROINTESTINAL: No abdominal or epigastric pain. No nausea, vomiting, or hematemesis; No diarrhea or constipation. No melena or hematochezia.  GENITOURINARY: No dysuria, frequency, hematuria, or incontinence  NEUROLOGICAL: No headaches, memory loss, loss of strength, numbness, or tremors  SKIN: No itching, burning, rashes, or lesions   ENDOCRINE: No heat or cold intolerance; No hair loss  MUSCULOSKELETAL: No joint pain or swelling; No muscle, back, or extremity pain  HEME/LYMPH: No easy bruising, or bleeding gums          Vital Signs Last 24 Hrs  T(C): 36.8 (07 May 2019 07:00), Max: 36.8 (07 May 2019 07:00)  T(F): 98.2 (07 May 2019 07:00), Max: 98.2 (07 May 2019 07:00)  HR: 80 (07 May 2019 07:00) (58 - 80)  BP: 158/68 (07 May 2019 07:00) (96/76 - 158/68)  BP(mean): 87 (06 May 2019 11:34) (77 - 87)  RR: 20 (07 May 2019 07:00) (17 - 22)  SpO2: 96% (06 May 2019 15:25) (92% - 96%)        PHYSICAL EXAM:  GENERAL: NAD, well-groomed, well-developed  HEAD:  Atraumatic, Normocephalic  NECK: Supple, No JVD, Normal thyroid  NERVOUS SYSTEM:  Alert & Oriented X3, Good concentration  CHEST/LUNG: Clear to percussion bilaterally; No rales, rhonchi, wheezing, or rubs  HEART: Regular rate and rhythm; No murmurs, rubs, or gallops  ABDOMEN: Soft, Nontender, Nondistended; Bowel sounds present  EXTREMITIES:  2+ Peripheral Pulses, No clubbing, cyanosis, or edema  SKIN: No rashes or lesions    INTERPRETATION OF TELEMETRY:    ECG:    I&O's Detail    06 May 2019 07:01  -  07 May 2019 07:00  --------------------------------------------------------  IN:    IV PiggyBack: 300 mL    lactated ringers.: 1425 mL  Total IN: 1725 mL    OUT:    Voided: 302 mL  Total OUT: 302 mL    Total NET: 1423 mL          LABS:                        10.8   4.73  )-----------( 122      ( 07 May 2019 07:59 )             33.3     05-07    141  |  103  |  12  ----------------------------<  83  3.5   |  26  |  0.8    Ca    7.6<L>      07 May 2019 07:59  Phos  2.4     05-  Mg     1.8     05-07    TPro  5.4<L>  /  Alb  3.1<L>  /  TBili  2.1<H>  /  DBili  1.3<H>  /  AST  105<H>  /  ALT  132<H>  /  AlkPhos  390<H>  05-07    CARDIAC MARKERS ( 06 May 2019 04:45 )  x     / <0.01 ng/mL / x     / x     / x      CARDIAC MARKERS ( 05 May 2019 16:28 )  x     / <0.01 ng/mL / x     / x     / x          PT/INR - ( 06 May 2019 04:45 )   PT: 14.00 sec;   INR: 1.22 ratio         PTT - ( 06 May 2019 04:45 )  PTT:35.1 sec  Urinalysis Basic - ( 05 May 2019 21:00 )    Color: Dark Yellow / Appearance: Clear / S.015 / pH: x  Gluc: x / Ketone: Negative  / Bili: Small / Urobili: 4.0 mg/dL   Blood: x / Protein: Negative mg/dL / Nitrite: Negative   Leuk Esterase: Negative / RBC: x / WBC x   Sq Epi: x / Non Sq Epi: x / Bacteria: x      I&O's Summary    06 May 2019 07:01  -  07 May 2019 07:00  --------------------------------------------------------  IN: 1725 mL / OUT: 302 mL / NET: 1423 mL        RADIOLOGY & ADDITIONAL STUDIES:

## 2019-05-08 ENCOUNTER — TRANSCRIPTION ENCOUNTER (OUTPATIENT)
Age: 79
End: 2019-05-08

## 2019-05-08 LAB
-  AMIKACIN: SIGNIFICANT CHANGE UP
-  AMPICILLIN/SULBACTAM: SIGNIFICANT CHANGE UP
-  AMPICILLIN: SIGNIFICANT CHANGE UP
-  AZTREONAM: SIGNIFICANT CHANGE UP
-  CEFAZOLIN: SIGNIFICANT CHANGE UP
-  CEFEPIME: SIGNIFICANT CHANGE UP
-  CEFOXITIN: SIGNIFICANT CHANGE UP
-  CEFTRIAXONE: SIGNIFICANT CHANGE UP
-  CIPROFLOXACIN: SIGNIFICANT CHANGE UP
-  ERTAPENEM: SIGNIFICANT CHANGE UP
-  GENTAMICIN: SIGNIFICANT CHANGE UP
-  IMIPENEM: SIGNIFICANT CHANGE UP
-  LEVOFLOXACIN: SIGNIFICANT CHANGE UP
-  MEROPENEM: SIGNIFICANT CHANGE UP
-  PIPERACILLIN/TAZOBACTAM: SIGNIFICANT CHANGE UP
-  TOBRAMYCIN: SIGNIFICANT CHANGE UP
-  TRIMETHOPRIM/SULFAMETHOXAZOLE: SIGNIFICANT CHANGE UP
ALBUMIN SERPL ELPH-MCNC: 3 G/DL — LOW (ref 3.5–5.2)
ALP SERPL-CCNC: 317 U/L — HIGH (ref 30–115)
ALT FLD-CCNC: 86 U/L — HIGH (ref 0–41)
ANION GAP SERPL CALC-SCNC: 14 MMOL/L — SIGNIFICANT CHANGE UP (ref 7–14)
AST SERPL-CCNC: 47 U/L — HIGH (ref 0–41)
BASOPHILS # BLD AUTO: 0.01 K/UL — SIGNIFICANT CHANGE UP (ref 0–0.2)
BASOPHILS NFR BLD AUTO: 0.2 % — SIGNIFICANT CHANGE UP (ref 0–1)
BILIRUB DIRECT SERPL-MCNC: 0.6 MG/DL — HIGH (ref 0–0.2)
BILIRUB SERPL-MCNC: 1 MG/DL — SIGNIFICANT CHANGE UP (ref 0.2–1.2)
BUN SERPL-MCNC: 11 MG/DL — SIGNIFICANT CHANGE UP (ref 10–20)
CALCIUM SERPL-MCNC: 7.9 MG/DL — LOW (ref 8.5–10.1)
CHLORIDE SERPL-SCNC: 100 MMOL/L — SIGNIFICANT CHANGE UP (ref 98–110)
CO2 SERPL-SCNC: 26 MMOL/L — SIGNIFICANT CHANGE UP (ref 17–32)
CREAT SERPL-MCNC: 0.7 MG/DL — SIGNIFICANT CHANGE UP (ref 0.7–1.5)
CULTURE RESULTS: SIGNIFICANT CHANGE UP
EOSINOPHIL # BLD AUTO: 0.11 K/UL — SIGNIFICANT CHANGE UP (ref 0–0.7)
EOSINOPHIL NFR BLD AUTO: 2.7 % — SIGNIFICANT CHANGE UP (ref 0–8)
GLUCOSE SERPL-MCNC: 81 MG/DL — SIGNIFICANT CHANGE UP (ref 70–99)
HCT VFR BLD CALC: 34.1 % — LOW (ref 42–52)
HGB BLD-MCNC: 11.3 G/DL — LOW (ref 14–18)
IMM GRANULOCYTES NFR BLD AUTO: 0.5 % — HIGH (ref 0.1–0.3)
IRON SATN MFR SERPL: 23 % — SIGNIFICANT CHANGE UP (ref 15–50)
IRON SATN MFR SERPL: 48 UG/DL — SIGNIFICANT CHANGE UP (ref 35–150)
LDH SERPL L TO P-CCNC: 139 U/L — SIGNIFICANT CHANGE UP (ref 50–242)
LYMPHOCYTES # BLD AUTO: 0.64 K/UL — LOW (ref 1.2–3.4)
LYMPHOCYTES # BLD AUTO: 15.6 % — LOW (ref 20.5–51.1)
MCHC RBC-ENTMCNC: 28 PG — SIGNIFICANT CHANGE UP (ref 27–31)
MCHC RBC-ENTMCNC: 33.1 G/DL — SIGNIFICANT CHANGE UP (ref 32–37)
MCV RBC AUTO: 84.6 FL — SIGNIFICANT CHANGE UP (ref 80–94)
METHOD TYPE: SIGNIFICANT CHANGE UP
MONOCYTES # BLD AUTO: 0.41 K/UL — SIGNIFICANT CHANGE UP (ref 0.1–0.6)
MONOCYTES NFR BLD AUTO: 10 % — HIGH (ref 1.7–9.3)
NEUTROPHILS # BLD AUTO: 2.9 K/UL — SIGNIFICANT CHANGE UP (ref 1.4–6.5)
NEUTROPHILS NFR BLD AUTO: 71 % — SIGNIFICANT CHANGE UP (ref 42.2–75.2)
NRBC # BLD: 0 /100 WBCS — SIGNIFICANT CHANGE UP (ref 0–0)
ORGANISM # SPEC MICROSCOPIC CNT: SIGNIFICANT CHANGE UP
PLATELET # BLD AUTO: 130 K/UL — SIGNIFICANT CHANGE UP (ref 130–400)
POTASSIUM SERPL-MCNC: 3.8 MMOL/L — SIGNIFICANT CHANGE UP (ref 3.5–5)
POTASSIUM SERPL-SCNC: 3.8 MMOL/L — SIGNIFICANT CHANGE UP (ref 3.5–5)
PROT SERPL-MCNC: 5.4 G/DL — LOW (ref 6–8)
RBC # BLD: 4.03 M/UL — LOW (ref 4.7–6.1)
RBC # FLD: 14.4 % — SIGNIFICANT CHANGE UP (ref 11.5–14.5)
SODIUM SERPL-SCNC: 140 MMOL/L — SIGNIFICANT CHANGE UP (ref 135–146)
SPECIMEN SOURCE: SIGNIFICANT CHANGE UP
TIBC SERPL-MCNC: 211 UG/DL — LOW (ref 220–430)
UIBC SERPL-MCNC: 163 UG/DL — SIGNIFICANT CHANGE UP (ref 110–370)
WBC # BLD: 4.09 K/UL — LOW (ref 4.8–10.8)
WBC # FLD AUTO: 4.09 K/UL — LOW (ref 4.8–10.8)

## 2019-05-08 PROCEDURE — 99231 SBSQ HOSP IP/OBS SF/LOW 25: CPT

## 2019-05-08 PROCEDURE — 93306 TTE W/DOPPLER COMPLETE: CPT | Mod: 26

## 2019-05-08 RX ORDER — ASPIRIN/CALCIUM CARB/MAGNESIUM 324 MG
81 TABLET ORAL DAILY
Qty: 0 | Refills: 0 | Status: DISCONTINUED | OUTPATIENT
Start: 2019-05-08 | End: 2019-05-09

## 2019-05-08 RX ORDER — ONDANSETRON 8 MG/1
4 TABLET, FILM COATED ORAL ONCE
Qty: 0 | Refills: 0 | Status: COMPLETED | OUTPATIENT
Start: 2019-05-08 | End: 2019-05-08

## 2019-05-08 RX ADMIN — CEFEPIME 100 MILLIGRAM(S): 1 INJECTION, POWDER, FOR SOLUTION INTRAMUSCULAR; INTRAVENOUS at 13:29

## 2019-05-08 RX ADMIN — Medication 81 MILLIGRAM(S): at 11:52

## 2019-05-08 RX ADMIN — Medication 137 MICROGRAM(S): at 08:07

## 2019-05-08 RX ADMIN — CEFEPIME 100 MILLIGRAM(S): 1 INJECTION, POWDER, FOR SOLUTION INTRAMUSCULAR; INTRAVENOUS at 05:46

## 2019-05-08 RX ADMIN — Medication 50 MILLIGRAM(S): at 05:46

## 2019-05-08 RX ADMIN — CHLORHEXIDINE GLUCONATE 1 APPLICATION(S): 213 SOLUTION TOPICAL at 05:46

## 2019-05-08 RX ADMIN — ONDANSETRON 4 MILLIGRAM(S): 8 TABLET, FILM COATED ORAL at 10:56

## 2019-05-08 RX ADMIN — Medication 100 MILLIGRAM(S): at 21:55

## 2019-05-08 RX ADMIN — PANTOPRAZOLE SODIUM 40 MILLIGRAM(S): 20 TABLET, DELAYED RELEASE ORAL at 05:47

## 2019-05-08 RX ADMIN — Medication 100 MILLIGRAM(S): at 06:58

## 2019-05-08 RX ADMIN — ENOXAPARIN SODIUM 40 MILLIGRAM(S): 100 INJECTION SUBCUTANEOUS at 05:46

## 2019-05-08 RX ADMIN — ISOSORBIDE MONONITRATE 30 MILLIGRAM(S): 60 TABLET, EXTENDED RELEASE ORAL at 11:52

## 2019-05-08 RX ADMIN — MONTELUKAST 10 MILLIGRAM(S): 4 TABLET, CHEWABLE ORAL at 21:55

## 2019-05-08 RX ADMIN — Medication 100 MILLIGRAM(S): at 13:28

## 2019-05-08 RX ADMIN — CEFEPIME 100 MILLIGRAM(S): 1 INJECTION, POWDER, FOR SOLUTION INTRAMUSCULAR; INTRAVENOUS at 21:55

## 2019-05-08 NOTE — DISCHARGE NOTE PROVIDER - HOSPITAL COURSE
Yobany Gu is a 78M with PMH of COPD, lung cancer s/p lobectomy, thyroid cancer s/p resection with iatrogenic hypothyroidism, CAD s/p PCI, HTN, and DLD who presented to [facility] with intermittent epigastric pain, no exacerbating or relieving factors for 3 months duration, associated with 35 lb weight loss. In the ED, patient was febrile with elevated LFTs and jaundice on physical exam. He was admitted to the ICU with the primary working diagnosis of acute cholangitis. After being stabilized patient was downgraded to medicine on 5/6/19. Patient was started on broad spectrum IV abx and seen and evaluated by both general surgery and advanced GI teams. Abdominal US was benign. CT A/P revealed mild dilation of the distal CBD to 1.0cm. However MRCP revealed no biliary ductal dilation or evidence of choledocholithiasis, with a 1.7cm cystic structure within the pancreatic body, possibly a serous cystadenoma. 1 blood cx from 5/5/19 grew Klebsiella. 2nd blood cx from 5/5 was negative and repeat blood cultures were ordered. Per GI, patient likely passed a stone, LFTs were downtrending and there was no indication for ERCP at this time. Patient is tolerating a regular diet and is medically stable for discharge to home on PO antibiotics with close outpatient follow up with the GI team. Statin temporarily on hold until resolution of the LFTs.         Echocardiogram performed during this admission.     Anemia workup ordered for normocytic anemia. Yobany Gu is a 78M with PMH of COPD, lung cancer s/p lobectomy, thyroid cancer s/p resection with iatrogenic hypothyroidism, CAD s/p PCI, HTN, and DLD who presented to [facility] with intermittent epigastric pain, no exacerbating or relieving factors for 3 months duration, associated with 35 lb weight loss. In the ED, patient was febrile with elevated LFTs and jaundice on physical exam. He was admitted to the ICU with the primary working diagnosis of acute cholangitis. Patient was started on broad spectrum IV abx and seen and evaluated by both general surgery and advanced GI teams. Statin was temporarily disocntinued. After being stabilized patient was downgraded to medicine on 5/6/19. Abdominal US was benign. CT A/P revealed mild dilation of the distal CBD to 1.0cm. However MRCP revealed no biliary ductal dilation or evidence of choledocholithiasis, with a 1.7cm cystic structure within the pancreatic body, possibly a serous cystadenoma. 1 blood cx from 5/5/19 grew Klebsiella. 2nd blood cx from 5/5 was negative. Per GI, patient likely passed a stone, LFTs were downtrending and there was no indication for ERCP at this time. Per surgery team, patient can follow up as an outpatient for elective cholecystectomy. Per ID team, patient can be discharged to home on PO antibiotics. Patient is tolerating a regular diet and is medically stable for discharge to home on PO antibiotics with close outpatient follow up with his PCP and GI, and general surgery as needed.         Echocardiogram performed during this admission revealed G1DD and mild pulmonary hypertension. Patient will follow up with his cardiologist per routine.     Anemia workup ordered for normocytic anemia did not show any evidence of iron/B12/folate deficiency or hemolysis.

## 2019-05-08 NOTE — DISCHARGE NOTE PROVIDER - NSDCFUADDINST_GEN_ALL_CORE_FT
Take all discharge medications as directed. STOP taking your statin (for cholesterol) at this time due to your elevated liver enzymes.  Follow up with your primary care doctor within 1 week.  Follow up with your gastroenterologist within 2 weeks.  Follow up with your cardiologist per routine.  You will need repeat blood work (liver function tests) in 1 week. Take all discharge medications as directed.  Follow up with your primary care doctor within 1 week.  Follow up with your gastroenterologist within 2 weeks.  Follow up with general surgery within 3 weeks to schedule elective cholecystectomy.   Follow up with your cardiologist per routine.  You will need repeat blood work (liver function tests) in 1 week. You can get a prescription for lab work at your primary care doctor appointment.

## 2019-05-08 NOTE — DISCHARGE NOTE PROVIDER - CARE PROVIDERS DIRECT ADDRESSES
,DirectAddress_Unknown,[email or web address],DirectAddress_Unknown,[email or web address]

## 2019-05-08 NOTE — DISCHARGE NOTE PROVIDER - CARE PROVIDER_API CALL
Antonio Barragan)  Gastroenterology; Internal Medicine  [address] [place] [address]  Phone: [phone]  Fax: [phone]  Follow Up Time:     Blas Lemon)  Surgical Physicians  [address], [address]rd [place] [address]  Phone: [phone]  Fax: [phone]  Follow Up Time:     Luis Manuel Calderon)  Cardiovascular Disease  [address] [facility], [address] [address]  Phone: [phone]  Fax: [phone]  Follow Up Time:     Benji Andrade)  Internal Medicine  [address] [place] [address]  Phone: [phone]  Fax: [phone]  Follow Up Time:

## 2019-05-08 NOTE — DISCHARGE NOTE PROVIDER - NSDCCPTREATMENT_GEN_ALL_CORE_FT
PRINCIPAL PROCEDURE  Procedure: Magnetic resonance cholangiopancreatography (MRCP) with contrast  Findings and Treatment:

## 2019-05-08 NOTE — PROGRESS NOTE ADULT - SUBJECTIVE AND OBJECTIVE BOX
SUBJECTIVE:    Patient is a 78y old Male who presents with a chief complaint of chest and Abdominal pain (07 May 2019 16:00)    Currently admitted to medicine with the primary diagnosis of Ascending cholangitis     Today is hospital day 3d. No acute overnight events. Patient reports nausea and generalized abdominal discomfort this AM. Abdomen soft, non-tender, bowel sounds present. LFTs have been downtrending. Per GI, patient likely passed stone. We will transition to PO antibiotics once blood cx sensitivities return and prepare the patient for d/c.     PAST MEDICAL & SURGICAL HISTORY  Osteopenia  Hyperuricemia  Lung cancer  Coronary artery disease involving native heart, angina presence unspecified, unspecified vessel or lesion type  ALTAF (obstructive sleep apnea)  Chronic obstructive pulmonary disease, unspecified COPD type  Dyslipidemia  Thyroid cancer  Hypertension, unspecified type  Gastroesophageal reflux disease, esophagitis presence not specified  H/O thyroidectomy  S/P lobectomy of lung    SOCIAL HISTORY:  Former smoker   Denies alcohol or illicit drug use     From (x ) home ( ) nursing home ( ) other   Ambulation status: (x ) ambulates independently ( ) ambulates with assistance ( ) ambulates with device ( ) dependent   Device: ( ) rolling walker ( ) cane     ALLERGIES:  No Known Allergies    MEDICATIONS:  STANDING MEDICATIONS  cefepime   IVPB 1000 milliGRAM(s) IV Intermittent every 8 hours  chlorhexidine 4% Liquid 1 Application(s) Topical <User Schedule>  enoxaparin Injectable 40 milliGRAM(s) SubCutaneous every 24 hours  isosorbide   mononitrate ER Tablet (IMDUR) 30 milliGRAM(s) Oral daily  levothyroxine 137 MICROGram(s) Oral <User Schedule>  levothyroxine 150 MICROGram(s) Oral <User Schedule>  metoprolol succinate ER 50 milliGRAM(s) Oral daily  metroNIDAZOLE  IVPB 500 milliGRAM(s) IV Intermittent every 8 hours  montelukast 10 milliGRAM(s) Oral at bedtime  ondansetron Injectable 4 milliGRAM(s) IV Push once  pantoprazole    Tablet 40 milliGRAM(s) Oral before breakfast    PRN MEDICATIONS  acetaminophen   Tablet .. 650 milliGRAM(s) Oral every 6 hours PRN  aluminum hydroxide/magnesium hydroxide/simethicone Suspension 30 milliLiter(s) Oral every 4 hours PRN    VITALS:   T(F): 97.9  HR: 65  BP: 115/59  RR: 20  SpO2: 98%    LABS:                        10.8   4.73  )-----------( 122      ( 07 May 2019 07:59 )             33.3     05-07    141  |  103  |  12  ----------------------------<  83  3.5   |  26  |  0.8    Ca    7.6<L>      07 May 2019 07:59  Phos  2.4     05-06  Mg     1.8     05-07    TPro  5.4<L>  /  Alb  3.0<L>  /  TBili  2.1<H>  /  DBili  1.4<H>  /  AST  106<H>  /  ALT  134<H>  /  AlkPhos  374<H>  05-07    Culture - Urine (collected 05 May 2019 21:00)  Source: .Urine Clean Catch (Midstream)  Final Report (06 May 2019 23:14):    <10,000 CFU/mL Normal Urogenital Ching    Culture - Blood (collected 05 May 2019 19:29)  Source: .Blood Blood  Preliminary Report (07 May 2019 06:00):    No growth to date.    Culture - Blood (collected 05 May 2019 19:29)  Source: .Blood Blood  Gram Stain (06 May 2019 18:30):    Growth in aerobic bottle: Gram Negative Rods  Preliminary Report (06 May 2019 18:30):    Growth in aerobic bottle: Gram Negative Rods    "Due to technical problems, Proteus sp. will Not be reported as part of    the BCID panel until further notice"    ***Blood Panel PCR results on this specimen are available    approximately 3 hours after the Gram stain result.***    Gram stain, PCR, and/or culture results may not always    correspond due to difference in methodologies.    ************************************************************    This PCR assay was performed using [employer].    The following targets are tested for: Enterococcus,    vancomycin resistant enterococci, Listeria monocytogenes,    coagulase negative staphylococci, S. aureus,    methicillin resistant S. aureus, Streptococcus agalactiae    (Group B), S. pneumoniae, S. pyogenes (Group A),    Acinetobacter baumannii, Enterobacter cloacae, E. coli,    Klebsiella oxytoca, K. pneumoniae, Proteus sp.,    Serratia marcescens, Haemophilus influenzae,    Neisseria meningitidis, Pseudomonas aeruginosa, Candida    albicans, C. glabrata, C krusei, C parapsilosis,    C. tropicalis and the KPC resistance gene.  Organism: Blood Culture PCR (06 May 2019 20:00)  Organism: Blood Culture PCR (06 May 2019 20:00)    RADIOLOGY:  No new imaging studies ordered today     PHYSICAL EXAM:  GEN: No acute distress, lying comfortably in bed, jaundice resolving   LUNGS: Clear to auscultation bilaterally, no labored breathing   HEART: S1/S2 present. RRR.   ABD: Soft, non-tender, non-distended. Bowel sounds present.   EXT: Noncyanotic, nonedematous, 2+ peripheral pulses, skin intact   NEURO: AAOX3, CN II-XII grossly intact

## 2019-05-08 NOTE — PROGRESS NOTE ADULT - SUBJECTIVE AND OBJECTIVE BOX
Progress Note: General Surgery  Patient: HERIBERTO RODRIGUEZ , 78y (1940)Male   MRN: [id number]  Location: [address]  Visit: 05-05-19 Inpatient  Date: 05-08-19 @ 17:19    Procedure/Diagnosis: r/o cholangitis    Events/ 24h: No acute events overnight. Pain controlled.    Vitals: T(F): 97.1 (05-08-19 @ 13:52), Max: 98.4 (05-07-19 @ 21:38)  HR: 60 (05-08-19 @ 13:52)  BP: 132/61 (05-08-19 @ 13:52) (115/59 - 156/55)  RR: 18 (05-08-19 @ 13:52)  SpO2: 98% (05-07-19 @ 21:38)        Diet: Diet, DASH/TLC:   Sodium & Cholesterol Restricted (05-08-19 @ 09:20)    IV Fluids:     Physical Examination:  General Appearance: NAD  HEENT: EOMI, sclera non-icteric.  Heart: RRR   Lungs: CTABL.   Abdomen:  Soft, mild llq tenderness, nondistended.   MSK/Extremities: Warm & well-perfused.   Skin: Warm, dry. No jaundice.       Medications: [Standing]  aspirin  chewable 81 milliGRAM(s) Oral daily  cefepime   IVPB 1000 milliGRAM(s) IV Intermittent every 8 hours  chlorhexidine 4% Liquid 1 Application(s) Topical <User Schedule>  enoxaparin Injectable 40 milliGRAM(s) SubCutaneous every 24 hours  isosorbide   mononitrate ER Tablet (IMDUR) 30 milliGRAM(s) Oral daily  levothyroxine 137 MICROGram(s) Oral <User Schedule>  levothyroxine 150 MICROGram(s) Oral <User Schedule>  metoprolol succinate ER 50 milliGRAM(s) Oral daily  metroNIDAZOLE  IVPB 500 milliGRAM(s) IV Intermittent every 8 hours  montelukast 10 milliGRAM(s) Oral at bedtime  pantoprazole    Tablet 40 milliGRAM(s) Oral before breakfast    DVT Prophylaxis: enoxaparin Injectable 40 milliGRAM(s) SubCutaneous every 24 hours    GI Prophylaxis: pantoprazole    Tablet 40 milliGRAM(s) Oral before breakfast    Antibiotics: cefepime   IVPB 1000 milliGRAM(s) IV Intermittent every 8 hours  metroNIDAZOLE  IVPB 500 milliGRAM(s) IV Intermittent every 8 hours    Anticoagulation:   Medications:[PRN]  acetaminophen   Tablet .. 650 milliGRAM(s) Oral every 6 hours PRN  aluminum hydroxide/magnesium hydroxide/simethicone Suspension 30 milliLiter(s) Oral every 4 hours PRN      Labs:                        11.3   4.09  )-----------( 130      ( 08 May 2019 12:23 )             34.1     05-08    140  |  100  |  11  ----------------------------<  81  3.8   |  26  |  0.7    Ca    7.9<L>      08 May 2019 12:23  Phos  2.4     05-06  Mg     1.8     05-07    TPro  5.4<L>  /  Alb  3.0<L>  /  TBili  1.0  /  DBili  0.6<H>  /  AST  47<H>  /  ALT  86<H>  /  AlkPhos  317<H>  05-08    LIVER FUNCTIONS - ( 08 May 2019 12:23 )  Alb: 3.0 g/dL / Pro: 5.4 g/dL / ALK PHOS: 317 U/L / ALT: 86 U/L / AST: 47 U/L / GGT: x               Urine/Micro:    Culture - Urine (collected 05 May 2019 21:00)  Source: .Urine Clean Catch (Midstream)  Final Report (06 May 2019 23:14):    <10,000 CFU/mL Normal Urogenital Ching    Culture - Blood (collected 05 May 2019 19:29)  Source: .Blood Blood  Preliminary Report (07 May 2019 06:00):    No growth to date.    Culture - Blood (collected 05 May 2019 19:29)  Source: .Blood Blood  Gram Stain (06 May 2019 18:30):    Growth in aerobic bottle: Gram Negative Rods  Final Report (08 May 2019 11:54):    Growth in aerobic bottle: Escherichia coli    "Due to technical problems, Proteus sp. will Not be reported as part of    the BCID panel until further notice"    ***Blood Panel PCR results on this specimen are available    approximately 3 hours after the Gram stain result.***    Gram stain, PCR, and/or culture results may not always    correspond due to difference in methodologies.    ************************************************************    This PCR assay was performed using [employer].    The following targets are tested for: Enterococcus,    vancomycin resistant enterococci, Listeria monocytogenes,    coagulase negative staphylococci, S. aureus,    methicillin resistant S. aureus, Streptococcus agalactiae    (Group B), S. pneumoniae, S. pyogenes (Group A),    Acinetobacter baumannii, Enterobacter cloacae, E. coli,    Klebsiella oxytoca, K. pneumoniae, Proteus sp.,    Serratia marcescens, Haemophilus influenzae,    Neisseria meningitidis, Pseudomonas aeruginosa, Candida    albicans, C. glabrata, C krusei, C parapsilosis,    C. tropicalis and the KPC resistance gene.  Organism: Blood Culture PCR  Escherichia coli (08 May 2019 11:54)  Organism: Escherichia coli (08 May 2019 11:54)  Organism: Blood Culture PCR (08 May 2019 11:54)      Imaging:     < from: CT Abdomen and Pelvis w/ IV Cont (05.05.19 @ 18:44) >  No CT evidence of acute intra-abdominal or pelvic pathology.    Mild dilatation of the distal CBD up to 1.0 cm without evidence of   choledocholithiasis. Further evaluation with nonemergent MRI/MRCP can be   obtained for further evaluation if clinically warranted.    < end of copied text >    < from: MR Abdomen No Cont (05.06.19 @ 19:48) >  1.  No biliary ductal dilatation or evidence of choledocholithiasis.    2.   A 1.7 cm cystic structure within the pancreatic body, possibly a   serous cystadenoma.    < end of copied text >      Assessment:  78y Male patient admitted; surgery consulted for r/o choledocholithiasis    Plan:    No evidence of choledocholithiasis  T bili downtrending  Outpt f/u for elective cholecystectomy  No acute intervention at this time  Please recall PRN    Date/Time: 05-08-19 @ 17:19

## 2019-05-08 NOTE — PROGRESS NOTE ADULT - SUBJECTIVE AND OBJECTIVE BOX
Patient is a 78y old  Male who presents with a chief complaint of chest and Abdominal pain (07 May 2019 09:49)    Patient was seen and examined.  c/o nausea   Denies abd pain, vomiting, diarrhea, fever, chills.  All systems reviewed positive history as mentioned above.    PAST MEDICAL & SURGICAL HISTORY:  Osteopenia  Hyperuricemia  Lung cancer  Coronary artery disease involving native heart, angina presence unspecified, unspecified vessel or lesion type  ALTAF (obstructive sleep apnea)  Chronic obstructive pulmonary disease, unspecified COPD type  Dyslipidemia  Thyroid cancer  Hypertension, unspecified type  Gastroesophageal reflux disease, esophagitis presence not specified  H/O thyroidectomy  S/P lobectomy of lung    Allergies  No Known Allergies    MEDICATIONS  (STANDING):  aspirin  chewable 81 milliGRAM(s) Oral daily  cefepime   IVPB 1000 milliGRAM(s) IV Intermittent every 8 hours  chlorhexidine 4% Liquid 1 Application(s) Topical <User Schedule>  enoxaparin Injectable 40 milliGRAM(s) SubCutaneous every 24 hours  isosorbide   mononitrate ER Tablet (IMDUR) 30 milliGRAM(s) Oral daily  levothyroxine 137 MICROGram(s) Oral <User Schedule>  levothyroxine 150 MICROGram(s) Oral <User Schedule>  metoprolol succinate ER 50 milliGRAM(s) Oral daily  metroNIDAZOLE  IVPB 500 milliGRAM(s) IV Intermittent every 8 hours  montelukast 10 milliGRAM(s) Oral at bedtime  pantoprazole    Tablet 40 milliGRAM(s) Oral before breakfast    MEDICATIONS  (PRN):  acetaminophen   Tablet .. 650 milliGRAM(s) Oral every 6 hours PRN Temp greater or equal to 38C (100.4F)  aluminum hydroxide/magnesium hydroxide/simethicone Suspension 30 milliLiter(s) Oral every 4 hours PRN Dyspepsia    ICU Vital Signs Last 24 Hrs  T(C): 36.6 (08 May 2019 06:24), Max: 37.1 (07 May 2019 14:00)  T(F): 97.9 (08 May 2019 06:24), Max: 98.7 (07 May 2019 14:00)  HR: 65 (08 May 2019 06:24) (61 - 77)  BP: 115/59 (08 May 2019 06:24) (115/59 - 168/74)  RR: 20 (08 May 2019 06:24) (18 - 20)  SpO2: 98% (07 May 2019 21:38) (98% - 98%)    O/E:  Awake, alert, not in distress.  HEENT: atraumatic, EOMI.  Chest: clear.  CVS: SIS2 +, no murmur.  P/A: Soft, BS+  CNS: non focal.  Ext: no edema feet.  Skin: no rash, no ulcers.  All systems reviewed positive findings as above.                          10.8<L>  4.73<L> )-----------( 122<L>    ( 07 May 2019 07:59 )             33.3<L>                        12.2<L>  7.31  )-----------( 149      ( 06 May 2019 20:21 )             38.5<L>  05-07    141  |  103  |  12  ----------------------------<  83  3.5   |  26  |  0.8  05-06    142  |  102  |  12  ----------------------------<  91  3.8   |  28  |  0.9    Ca    7.6<L>      07 May 2019 07:59  Ca    8.4<L>      06 May 2019 20:21  Phos  2.4     05-06  Mg     1.8     05-07    TPro  5.4<L>  /  Alb  3.0<L>  /  TBili  2.1<H>  /  DBili  1.4<H>  /  AST  106<H>  /  ALT  134<H>  /  AlkPhos  374<H>  05-07  TPro  5.4<L>  /  Alb  3.1<L>  /  TBili  2.1<H>  /  DBili  1.3<H>  /  AST  105<H>  /  ALT  132<H>  /  AlkPhos  390<H>  05-07  TPro  6.3  /  Alb  3.6  /  TBili  4.7<H>  /  DBili  3.5<H>  /  AST  175<H>  /  ALT  182<H>  /  AlkPhos  464<H>  05-06

## 2019-05-08 NOTE — PROGRESS NOTE ADULT - ATTENDING COMMENTS
Assessment and plan above were modified and discussed with residents, physician assistants, and nurses.
Agree with above. No plan for ERCP as it is no clinically warranted

## 2019-05-08 NOTE — DISCHARGE NOTE PROVIDER - NSDCCPCAREPLAN_GEN_ALL_CORE_FT
PRINCIPAL DISCHARGE DIAGNOSIS  Diagnosis: Ascending cholangitis  Assessment and Plan of Treatment: -You were admitted to the hospital with abdominal pain, jaundice (yellow discoloration of the skin due to elevated bilirubin levels), fever, and elevated liver enzymes likely secondary to acute infection of the liver due to a stone in the common bile duct. One blood culture was positive for Klebsiella (bacteria).   -No acute findings on abdominal ultrasound or CT scan of the abdomen/pelvis. MRCP revealed NO dilation of the biliary ducts or evidence of choledocholithiasis (stones in the biliary ducts). MRCP did reveal a 1.7cm cystic structure within the pancreatic body, possibly a serous cystadenoma. We recommend discussing these findings with your gastroenterologist at your follow up appointment.   -You were treated with antibiotics and your diet was slowly advanced. Your liver enzymes have been downtrending. No ERCP is indicated at this time, as you likely passed the stone(s) that were causing the pain and elevated liver enzymes. The general surgery team is in agreement with this plan.  -Please continue to take all discharge medications as directed.  -Follow up with your gastroenterologist within 2 weeks.  -Follow up with your primary care doctor within 1 week.   -You will need repeat blood work in 1 week.      SECONDARY DISCHARGE DIAGNOSES  Diagnosis: Coronary artery disease  Assessment and Plan of Treatment: -STOP taking your statin at this time due to your elevated liver enzymes. They can be restarted when your blood work improves with your doctor's approval.  -Echocardiogram performed during this admission   -Follow up with your primary care doctor PRINCIPAL DISCHARGE DIAGNOSIS  Diagnosis: Ascending cholangitis  Assessment and Plan of Treatment: -You were admitted to the hospital with abdominal pain, jaundice (yellow discoloration of the skin due to elevated bilirubin levels), fever, and elevated liver enzymes likely secondary to acute infection of the liver due to a stone in the common bile duct. One blood culture was positive for Klebsiella (bacteria).   -There were no acute findings on abdominal ultrasound or CT scan of the abdomen/pelvis. MRCP revealed NO dilation of the biliary ducts or evidence of choledocholithiasis (stones in the biliary ducts). MRCP did reveal a 1.7cm cystic structure within the pancreatic body, possibly a serous cystadenoma. We recommend discussing these findings with your gastroenterologist at your follow up appointment.   -You were treated with antibiotics and your diet was slowly advanced. Your liver enzymes have been downtrending (improving). No ERCP procedure is indicated at this time, as you likely passed the stone(s) that were causing the pain and elevated liver enzymes. The general surgery team is in agreement with this plan.  -Please continue to take all discharge medications as directed.   -Follow up with your gastroenterologist within 2 weeks.   -Schedule an outpatient general surgery appointment to discuss an elective cholecystectomy (removal of the gallbladder)   -Follow up with your primary care doctor within 1 week.   -You will need repeat blood work in 1 week to check your liver enzymes      SECONDARY DISCHARGE DIAGNOSES  Diagnosis: Coronary artery disease  Assessment and Plan of Treatment: -Echocardiogram performed during this admission revealed no decreased left ventricular function.    -Follow up with your primary care doctor within 1 week   -Follow up with your cardiologist per routine PRINCIPAL DISCHARGE DIAGNOSIS  Diagnosis: Acute sepsis  Assessment and Plan of Treatment: -You were admitted to the hospital with abdominal pain, jaundice (yellow discoloration of the skin due to elevated bilirubin levels), fever, and elevated liver enzymes likely secondary to acute infection of the liver due to a stone in the common bile duct. One blood culture was positive for Klebsiella (bacteria).   -There were no acute findings on abdominal ultrasound or CT scan of the abdomen/pelvis. MRCP revealed NO dilation of the biliary ducts or evidence of choledocholithiasis (stones in the biliary ducts). MRCP did reveal a 1.7cm cystic structure within the pancreatic body, possibly a serous cystadenoma. We recommend discussing these findings with your gastroenterologist at your follow up appointment.   -You were treated with antibiotics and your diet was slowly advanced. Your liver enzymes have been downtrending (improving). No ERCP procedure is indicated at this time, as you likely passed the stone(s) that were causing the pain and elevated liver enzymes. The general surgery team is in agreement with this plan.  -Please continue to take all discharge medications as directed.   -Follow up with your gastroenterologist within 2 weeks.   -Schedule an outpatient general surgery appointment to discuss an elective cholecystectomy (removal of the gallbladder)   -Follow up with your primary care doctor within 1 week.   -You will need repeat blood work in 1 week to check your liver enzymes      SECONDARY DISCHARGE DIAGNOSES  Diagnosis: Coronary artery disease  Assessment and Plan of Treatment: -Echocardiogram performed during this admission revealed no decreased left ventricular function.    -Follow up with your primary care doctor within 1 week   -Follow up with your cardiologist per routine PRINCIPAL DISCHARGE DIAGNOSIS  Diagnosis: Acute sepsis  Assessment and Plan of Treatment: -You were admitted to the hospital with abdominal pain, jaundice (yellow discoloration of the skin due to elevated bilirubin levels), fever, and elevated liver enzymes likely secondary to acute infection of the liver due to a stone in the common bile duct (acute cholangitis). One blood culture was positive for Klebsiella (bacteria).   -There were no acute findings on abdominal ultrasound or CT scan of the abdomen/pelvis. MRCP revealed NO dilation of the biliary ducts or evidence of choledocholithiasis (stones in the biliary ducts). MRCP did reveal a 1.7cm cystic structure within the pancreatic body, possibly a serous cystadenoma. We recommend discussing these findings with your gastroenterologist at your follow up appointment.   -You were treated with antibiotics and your diet was slowly advanced. Your liver enzymes have been downtrending (improving). No ERCP procedure is indicated at this time, as you likely passed the stone(s) that were causing the pain and elevated liver enzymes. The general surgery team is in agreement with this plan.  -Please continue to take all discharge medications as directed.   -Follow up with your gastroenterologist within 2 weeks.   -Schedule an outpatient general surgery appointment to discuss an elective cholecystectomy (removal of the gallbladder)   -Follow up with your primary care doctor within 1 week.   -You will need repeat blood work in 1 week to check your liver enzymes      SECONDARY DISCHARGE DIAGNOSES  Diagnosis: Coronary artery disease  Assessment and Plan of Treatment: -Echocardiogram performed during this admission revealed no decreased left ventricular function.    -Follow up with your primary care doctor within 1 week   -Follow up with your cardiologist per routine

## 2019-05-08 NOTE — DISCHARGE NOTE PROVIDER - PROVIDER TOKENS
PROVIDER:[TOKEN:[[id number]]],PROVIDER:[TOKEN:[[id number]]],PROVIDER:[TOKEN:[[id number]]],PROVIDER:[TOKEN:[[id number]]]

## 2019-05-09 ENCOUNTER — TRANSCRIPTION ENCOUNTER (OUTPATIENT)
Age: 79
End: 2019-05-09

## 2019-05-09 VITALS
OXYGEN SATURATION: 92 % | SYSTOLIC BLOOD PRESSURE: 134 MMHG | TEMPERATURE: 98 F | RESPIRATION RATE: 20 BRPM | DIASTOLIC BLOOD PRESSURE: 61 MMHG | HEART RATE: 68 BPM

## 2019-05-09 DIAGNOSIS — K83.09 OTHER CHOLANGITIS: ICD-10-CM

## 2019-05-09 LAB
ALBUMIN SERPL ELPH-MCNC: 3.2 G/DL — LOW (ref 3.5–5.2)
ALP SERPL-CCNC: 281 U/L — HIGH (ref 30–115)
ALT FLD-CCNC: 67 U/L — HIGH (ref 0–41)
ANION GAP SERPL CALC-SCNC: 12 MMOL/L — SIGNIFICANT CHANGE UP (ref 7–14)
AST SERPL-CCNC: 34 U/L — SIGNIFICANT CHANGE UP (ref 0–41)
BASOPHILS # BLD AUTO: 0.01 K/UL — SIGNIFICANT CHANGE UP (ref 0–0.2)
BASOPHILS NFR BLD AUTO: 0.2 % — SIGNIFICANT CHANGE UP (ref 0–1)
BILIRUB DIRECT SERPL-MCNC: 0.5 MG/DL — HIGH (ref 0–0.2)
BILIRUB SERPL-MCNC: 0.9 MG/DL — SIGNIFICANT CHANGE UP (ref 0.2–1.2)
BLD GP AB SCN SERPL QL: SIGNIFICANT CHANGE UP
BUN SERPL-MCNC: 14 MG/DL — SIGNIFICANT CHANGE UP (ref 10–20)
CALCIUM SERPL-MCNC: 8 MG/DL — LOW (ref 8.5–10.1)
CHLORIDE SERPL-SCNC: 102 MMOL/L — SIGNIFICANT CHANGE UP (ref 98–110)
CO2 SERPL-SCNC: 28 MMOL/L — SIGNIFICANT CHANGE UP (ref 17–32)
CREAT SERPL-MCNC: 0.8 MG/DL — SIGNIFICANT CHANGE UP (ref 0.7–1.5)
EOSINOPHIL # BLD AUTO: 0.18 K/UL — SIGNIFICANT CHANGE UP (ref 0–0.7)
EOSINOPHIL NFR BLD AUTO: 4 % — SIGNIFICANT CHANGE UP (ref 0–8)
FERRITIN SERPL-MCNC: 98 NG/ML — SIGNIFICANT CHANGE UP (ref 30–400)
FOLATE SERPL-MCNC: 16.9 NG/ML — SIGNIFICANT CHANGE UP
GLUCOSE SERPL-MCNC: 103 MG/DL — HIGH (ref 70–99)
HAPTOGLOB SERPL-MCNC: 251 MG/DL — HIGH (ref 34–200)
HCT VFR BLD CALC: 35.5 % — LOW (ref 42–52)
HGB BLD-MCNC: 11.6 G/DL — LOW (ref 14–18)
IMM GRANULOCYTES NFR BLD AUTO: 0.4 % — HIGH (ref 0.1–0.3)
LYMPHOCYTES # BLD AUTO: 0.59 K/UL — LOW (ref 1.2–3.4)
LYMPHOCYTES # BLD AUTO: 13.2 % — LOW (ref 20.5–51.1)
MAGNESIUM SERPL-MCNC: 1.7 MG/DL — LOW (ref 1.8–2.4)
MCHC RBC-ENTMCNC: 28.1 PG — SIGNIFICANT CHANGE UP (ref 27–31)
MCHC RBC-ENTMCNC: 32.7 G/DL — SIGNIFICANT CHANGE UP (ref 32–37)
MCV RBC AUTO: 86 FL — SIGNIFICANT CHANGE UP (ref 80–94)
MONOCYTES # BLD AUTO: 0.49 K/UL — SIGNIFICANT CHANGE UP (ref 0.1–0.6)
MONOCYTES NFR BLD AUTO: 11 % — HIGH (ref 1.7–9.3)
NEUTROPHILS # BLD AUTO: 3.18 K/UL — SIGNIFICANT CHANGE UP (ref 1.4–6.5)
NEUTROPHILS NFR BLD AUTO: 71.2 % — SIGNIFICANT CHANGE UP (ref 42.2–75.2)
NRBC # BLD: 0 /100 WBCS — SIGNIFICANT CHANGE UP (ref 0–0)
PLATELET # BLD AUTO: 150 K/UL — SIGNIFICANT CHANGE UP (ref 130–400)
POTASSIUM SERPL-MCNC: 3.9 MMOL/L — SIGNIFICANT CHANGE UP (ref 3.5–5)
POTASSIUM SERPL-SCNC: 3.9 MMOL/L — SIGNIFICANT CHANGE UP (ref 3.5–5)
PROT SERPL-MCNC: 5.4 G/DL — LOW (ref 6–8)
RBC # BLD: 4.13 M/UL — LOW (ref 4.7–6.1)
RBC # FLD: 14.6 % — HIGH (ref 11.5–14.5)
SODIUM SERPL-SCNC: 142 MMOL/L — SIGNIFICANT CHANGE UP (ref 135–146)
TYPE + AB SCN PNL BLD: SIGNIFICANT CHANGE UP
VIT B12 SERPL-MCNC: 272 PG/ML — SIGNIFICANT CHANGE UP (ref 232–1245)
WBC # BLD: 4.47 K/UL — LOW (ref 4.8–10.8)
WBC # FLD AUTO: 4.47 K/UL — LOW (ref 4.8–10.8)

## 2019-05-09 RX ORDER — ATORVASTATIN CALCIUM 80 MG/1
1 TABLET, FILM COATED ORAL
Qty: 0 | Refills: 0 | DISCHARGE
Start: 2019-05-09

## 2019-05-09 RX ORDER — ATORVASTATIN CALCIUM 80 MG/1
1 TABLET, FILM COATED ORAL
Qty: 0 | Refills: 0 | DISCHARGE

## 2019-05-09 RX ORDER — LOSARTAN POTASSIUM 100 MG/1
50 TABLET, FILM COATED ORAL DAILY
Refills: 0 | Status: DISCONTINUED | OUTPATIENT
Start: 2019-05-09 | End: 2019-05-09

## 2019-05-09 RX ORDER — ATORVASTATIN CALCIUM 80 MG/1
10 TABLET, FILM COATED ORAL AT BEDTIME
Refills: 0 | Status: DISCONTINUED | OUTPATIENT
Start: 2019-05-09 | End: 2019-05-09

## 2019-05-09 RX ORDER — ASPIRIN/CALCIUM CARB/MAGNESIUM 324 MG
1 TABLET ORAL
Qty: 0 | Refills: 0 | DISCHARGE
Start: 2019-05-09

## 2019-05-09 RX ORDER — MAGNESIUM SULFATE 500 MG/ML
2 VIAL (ML) INJECTION ONCE
Refills: 0 | Status: COMPLETED | OUTPATIENT
Start: 2019-05-09 | End: 2019-05-09

## 2019-05-09 RX ORDER — ASPIRIN/CALCIUM CARB/MAGNESIUM 324 MG
1 TABLET ORAL
Qty: 0 | Refills: 0 | DISCHARGE

## 2019-05-09 RX ORDER — CEFTRIAXONE 500 MG/1
2 INJECTION, POWDER, FOR SOLUTION INTRAMUSCULAR; INTRAVENOUS ONCE
Refills: 0 | Status: COMPLETED | OUTPATIENT
Start: 2019-05-09 | End: 2019-05-09

## 2019-05-09 RX ADMIN — Medication 81 MILLIGRAM(S): at 11:16

## 2019-05-09 RX ADMIN — CEFTRIAXONE 100 GRAM(S): 500 INJECTION, POWDER, FOR SOLUTION INTRAMUSCULAR; INTRAVENOUS at 16:01

## 2019-05-09 RX ADMIN — Medication 25 GRAM(S): at 11:16

## 2019-05-09 RX ADMIN — Medication 50 MILLIGRAM(S): at 06:29

## 2019-05-09 RX ADMIN — Medication 150 MICROGRAM(S): at 10:18

## 2019-05-09 RX ADMIN — Medication 100 MILLIGRAM(S): at 13:08

## 2019-05-09 RX ADMIN — ENOXAPARIN SODIUM 40 MILLIGRAM(S): 100 INJECTION SUBCUTANEOUS at 10:18

## 2019-05-09 RX ADMIN — CEFEPIME 100 MILLIGRAM(S): 1 INJECTION, POWDER, FOR SOLUTION INTRAMUSCULAR; INTRAVENOUS at 06:29

## 2019-05-09 RX ADMIN — PANTOPRAZOLE SODIUM 40 MILLIGRAM(S): 20 TABLET, DELAYED RELEASE ORAL at 06:29

## 2019-05-09 RX ADMIN — Medication 100 MILLIGRAM(S): at 06:29

## 2019-05-09 RX ADMIN — CEFEPIME 100 MILLIGRAM(S): 1 INJECTION, POWDER, FOR SOLUTION INTRAMUSCULAR; INTRAVENOUS at 13:08

## 2019-05-09 RX ADMIN — ISOSORBIDE MONONITRATE 30 MILLIGRAM(S): 60 TABLET, EXTENDED RELEASE ORAL at 11:16

## 2019-05-09 NOTE — DISCHARGE NOTE NURSING/CASE MANAGEMENT/SOCIAL WORK - NSDCPEWEB_GEN_ALL_CORE
[place] website --- [email or web address] website --- [email or web address]

## 2019-05-09 NOTE — CONSULT NOTE ADULT - ASSESSMENT
ASSESSMENT  78y M with PMHx of Osteopenia, Hyperuricemia, Lung CA, CAD, ALTAF, COPD, DLD, Thyroid CA, HTN, GERD presenting with admitted with acute cholangitis    Acute Cholangitis  Klebsiella, E. Coli bacteremia    RECOMMENDATIONS  - Sepsis ruled out on admission, had remote fever of 100.6 on 05/06  - F/u blood culture and sensitivity, will recommend discharge regiment with Klebsiella sensitivities, possible to discharge on ceftriaxone 1 gram q12h for 7 days, if ESBL Klebsiella not present, otherwise Meropenem 1 gram 18h for 7 days. ASSESSMENT  78y M with PMHx of Osteopenia, Hyperuricemia, Lung CA, CAD, ALTAF, COPD, DLD, Thyroid CA, HTN, GERD presenting with admitted with acute cholangitis    Acute Cholangitis  Klebsiella, E. Coli bacteremia    RECOMMENDATIONS  - Sepsis ruled out on admission, had remote fever of 100.6 on 05/06  - SHAKIRA present, no sign of endocarditis on TTE  - F/u blood culture and sensitivity, will recommend discharge regiment with Klebsiella sensitivities, possible to discharge on ceftriaxone 2 grams q12h for 7 days, if ESBL Klebsiella not present, otherwise Meropenem 1 gram q8h for 7 days. ASSESSMENT  78y M with PMHx of Osteopenia, Hyperuricemia, Lung CA, CAD, ALTAF, COPD, DLD, Thyroid CA, HTN, GERD presenting with admitted with acute cholangitis    Acute Cholangitis  Klebsiella, E. Coli bacteremia    RECOMMENDATIONS  - Sepsis ruled out on admission, had remote fever of 100.6 on 05/06  - SHAKIRA present, no sign of endocarditis on TTE  - F/u blood culture and sensitivity, will recommend discharge regiment with Klebsiella sensitivities, possible to discharge on ceftriaxone 2 grams q12h for 7 days, if ESBL Klebsiella not present, otherwise Meropenem 1 gram q8h for 7 days.    -attending recs pending ASSESSMENT  78y M with PMHx of Osteopenia, Hyperuricemia, Lung CA, CAD, ALTAF, COPD, DLD, Thyroid CA, HTN, GERD presenting with admitted with acute cholangitis    Acute Cholangitis  Klebsiella, E. Coli bacteremia    RECOMMENDATIONS  - Sepsis ruled out on admission, had remote fever of 100.6 on 05/06  - SHAKIRA present, no sign of endocarditis on TTE  -s/p 5 days Cefepime and Flagyl  - F/u blood culture and sensitivity, will recommend discharge regiment with Klebsiella sensitivities, possible to discharge on ceftriaxone 2 grams q12h for 7 days, if ESBL Klebsiella not present, otherwise Meropenem 1 gram q8h for total 7 days.    -attending recs pending ASSESSMENT  78y M with PMHx of Osteopenia, Hyperuricemia, Lung CA, CAD, ALTAF, COPD, DLD, Thyroid CA, HTN, GERD presenting with admitted with acute cholangitis    Acute Cholangitis with E. Coli bacteremia  Sepsis ruled out on admission  - remote fever of 100.6 on 05/06  - SHAKIRA present, no sign of endocarditis on TTE  - s/p 5 days Cefepime and Flagyl    RECOMMENDATIONS  - Rocephin 2 grams x 1 dose  - D/C on PO Flagyl 500 mg q12h, PO Vantin 200 mg q12h x 6 days

## 2019-05-09 NOTE — PROGRESS NOTE ADULT - ASSESSMENT
78M PMH of lung cancer s/p lobectomy, thyroid cancer, COPD, CAD s/p PCI, HTN, and DLD presented with intermittent sharp mid-epigastric pain, no exacerbating or relieving factors for 3 months duration, associated with 35 lb weight loss. In the ED, patient was febrile with elevated LFTs and he was admitted to ICU with primary diagnosis of acute cholangitis. After being stabilized, on 5/6/19 patient was downgraded from ICU to medicine.     #Epigastric Abdominal Pain, Jaundice, Fever (Resolved), likely 2/2 acute choledocholiathiasis; Klebsiella Bacteremia   -Hemodynamically stable   -Normal endoscopy in January 2018   -ABD US benign  -CT A/P: mild dilation of distal CBD to 1.0cm  -MRCP: no biliary ductal dilation or evidence of choledocholithiasis; 1.7cm cystic structure within the pancreatitic body, possibly a serous cystadenoma   -1 blood cx 5/5 with Klebsiella (f/u sensitivities); 2nd blood cx 5/5 no growth to date; f/u repeat blood cx  -Surgery evaluation appreciated   -Advanced GI: elevated LFTs and stone likely 2/2 passed stones, no plan for ERCP   -Trend LFTs   -Advancing diet to DASH/TLC, Zofran PRN for N/V   -IV Cefepime and Flagyl   -Plan is to switch from IV to PO abx when blood cx sensitivities return and prepare the patient for d/c with outpatient GI follow up     #CAD s/p PCI   -Holding statin due to elevated LFTs; continue BB and Imdur   -Follow up 2D echo   -f/u with cardiologist as outpatient     #Normocytic Anemia: f/u anemia workup     #HTN, stable: Continue BB, restart ARB PRN     #PMH of COPD, Lung ca s/p lobectomy  -Stable, f/u with pulmonologist as outpatient; continue montelukast; breo ellipta as outpatient     #Thyroid cancer s/p resection with iatrogenic hypothyroidism  -Continue levothyroxine    DVT ppx: Lovenox   GI ppx: Protonix   Diet: DASH/TLC   Activity: ambulate as tolerated   Dispo: prepare for d/c on PO abx when blood cx sensitivities return and echo performed/interpreted   FULL CODE
78M PMH of lung cancer s/p lobectomy, thyroid cancer, COPD, CAD s/p PCI, HTN, and DLD presented with intermittent sharp mid-epigastric pain, no exacerbating or relieving factors for 3 months duration, associated with 35 lb weight loss. In the ED, patient was febrile with elevated LFTs and he was admitted to ICU with primary diagnosis of acute cholangitis. After being stabilized, on 5/6/19 patient was downgraded from ICU to medicine.     #Epigastric Abdominal Pain, Jaundice, Fever (Resolved) R/O Acute Choledocholithiasis vs. Cholangitis; Klebsiella Bacteremia   -Hemodynamically stable   -Normal endoscopy in January 2018   -ABD US benign  -CT A/P: mild dilation of distal CBD to 1.0cm  -MRCP: no biliary ductal dilation or evidence of choledocholithiasis; 1.7cm cystic structure within the pancreatitic body, possibly a serous cystadenoma   -1 blood cx 5/5 with Klebsiella (f/u sensitivities); 2nd blood cx 5/5 no growth to date; f/u repeat blood cx   -Surgery on board  -Advanced GI on board, f/u new recommendations   -NPO with IVFs (LR @ 75 cc/hr)   -IV Cefepime and flagyl  -Trend LFTs    #CAD s/p PCI   -Holding aspirin and statin; continue BB and Imdur   -Pt + fluid balance 1.5L, CXR with pulmonary vascular congestion, will get 2D echo, monitor I&O  -f/u with cardiologist as outpatient     #Normocytic Anemia: f/u anemia workup     #HTN, stable: HOLD ARB, continue BB     #PMH of COPD, Lung ca s/p lobectomy  -Stable, f/u with pulmonologist as outpatient; continue montelukast; breo ellipta as outpatient     #Thyroid cancer s/p resection with iatrogenic hypothyroidism  -Continue levothyroxine    DVT ppx: Lovenox   GI ppx: Protonix   Diet: NPO for now pending GI recs   Activity: OOB to chair  Dispo: pending GI recs, d/c to home when medically stable  FULL CODE
78M PMH of lung cancer s/p lobectomy, thyroid cancer, COPD, CAD s/p PCI, HTN, and DLD presented with intermittent sharp mid-epigastric pain, no exacerbating or relieving factors for 3 months duration, associated with 35 lb weight loss. In the ED, patient was febrile with elevated LFTs and he was admitted to ICU with primary diagnosis of acute cholangitis. After being stabilized, on 5/6/19 patient was downgraded from ICU to medicine.     #Epigastric Abdominal Pain, Jaundice, Fever likely 2/2 acute cholangitis, resolved; Klebsiella Bacteremia   -Hemodynamically stable   -Normal endoscopy in January 2018   -ABD US benign  -CT A/P: mild dilation of distal CBD to 1.0cm  -MRCP: no biliary ductal dilation or evidence of choledocholithiasis; 1.7cm cystic structure within the pancreatitic body, possibly a serous cystadenoma   -1 blood cx 5/5 with Klebsiella, 2nd blood cx 5/5 no growth to date; f/u repeat blood cx  -Surgery evaluation appreciated, elective cholecystectomy as outpatient   -Advanced GI: elevated LFTs and stone likely 2/2 passed stones, no plan for ERCP, f/u outpatient    -Monitor LFTs   -Patient tolerating diet, Zofran PRN for N/V   -IV Cefepime and Flagyl for now. ID consulted for d/c abx recommendations, will place midline as needed     #Hypomagnesemia: repleted     #CAD s/p PCI   -Statin restarted, monitor LFTs; continue BB and Imdur   -TTE: G1DD, mild pulm HTN   -f/u with cardiologist as outpatient     #Normocytic Anemia, stable: No iron/folate/B12 deficiency, no evidence of hemolysis    #HTN, stable: Continue BB; valsartan 160 not available, losartan 50mg started; monitor BP     #PMH of COPD, Lung ca s/p lobectomy  -Stable, f/u with pulmonologist as outpatient; continue montelukast and breo ellipta     #Thyroid cancer s/p resection with iatrogenic hypothyroidism  -Continue levothyroxine    DVT ppx: Lovenox   GI ppx: Protonix   Diet: DASH/TLC   Activity: ambulate as tolerated   Dispo: prepare for d/c, awaiting ID recommendations   FULL CODE
79 yo M PMH of Lung ca s/p lobectomy, Thyroid cancer, hx of COPD, CAD s/p PCI, HTN, DLD presenting with sharp abdominal and chest pain mid epigastric, not associated with food, waxing and weaning that started in February and the patient had no explanation for. He reports having a normal endoscopy and scotoscopy in 2018. He reports changed in his color of urine to very dark orange color. No naren colored stools no pruritis. Weight loss of 35 pounds in the last few months.  Pt was downgraded to CEU last evening with diagnosis of sepsis and cholangitis    #Sepsis sec to acute cholangitis-resolving  -  CT Abdomen and Pelvis w/ IV Cont (05.05.19 @ 18:44) Mild dilatation of the distal CBD up to 1.0 cm without evidence of  choledocholithiasis.  - US Abdomen Limited (05.05.19 @ 21:08)No evidence of cholelithiasis.No intrahepatic biliary ductal dilatation. The common bile duct measures   5 mm, which is normal.   - MR Abdomen No Cont (05.06.19 @ 19:48) >No biliary ductal dilatation or evidence of choledocholithiasis.A 1.7 cm cystic structure within the pancreatic body, possibly a serous cystadenoma.  - Blood cultures- klebsiella positive  - C/w cefepime and flagyl  - advance diet  - LFTs trending down, continue to monitor   - GI f/u- Pt likely passed stone    #Hx of CAD s/p PCI  -c/w  imdur, metoprolol  - if BP tolerated restart ACEI  - F/u echo    # COPD, Lung ca s/p lobectomy -Stable    # Hypothyroidism after thyroid surgery for thyroid cancer   -c/w  synthroid    #Hypertension  - c/w  metoprolol, imdur    # GI/DVT prophylaxis    # Full code    #Pending:echo, monitor LFT's, F/u final blood cultures  #Discussed with patient  #Disposition : home when stable
79 yo M PMH of Lung ca s/p lobectomy, Thyroid cancer, hx of COPD, CAD s/p PCI, HTN, DLD presenting with sharp abdominal and chest pain mid epigastric, not associated with food, waxing and weaning that started in February and the patient had no explanation for. He reports having a normal endoscopy and scotoscopy in 2018. He reports changed in his color of urine to very dark orange color. No naren colored stools no pruritis. Weight loss of 35 pounds in the last few months.  Pt was downgraded to CEU last evening with diagnosis of sepsis and cholangitis    #Sepsis sec to acute cholangitis-resolving  -  CT Abdomen and Pelvis w/ IV Cont (05.05.19 @ 18:44) Mild dilatation of the distal CBD up to 1.0 cm without evidence of  choledocholithiasis.  - US Abdomen Limited (05.05.19 @ 21:08)No evidence of cholelithiasis.No intrahepatic biliary ductal dilatation. The common bile duct measures   5 mm, which is normal.   - MR Abdomen No Cont (05.06.19 @ 19:48) >No biliary ductal dilatation or evidence of choledocholithiasis.A 1.7 cm cystic structure within the pancreatic body, possibly a serous cystadenoma.  - Blood cultures- klebsiella positive  - C/w cefepime and flagyl  - c/w IV fluids  - LFTs trending down, continue to monitor   - GI f/u    #Hx of CAD s/p PCI  -restart imdur, metoprolol  - if BP tolerated restart ACEI  - echo    # COPD, Lung ca s/p lobectomy -Stable    # Hypothyroidism after thyroid surgery for thyroid cancer   -c/w  synthroid    #Hypertension  - restart metoprolol, imdur    # GI/DVT prophylaxis    # Full code    #Pending: GI F/u, echo, monitor LFT's  #Discussed with patient  #Disposition : home when stable
Impression:    Cholangitis?/ sepsis/ no pressors/ G- bacteremia improved LFT ? passed stone  HO lung Cancer      PLAN:    CNS: Pain control.     HEENT: Oral care    PULMONARY:  HOB @ 45 degrees.     CARDIOVASCULAR: Continue with Iv fluid for now.     GI: feeding per GI, GI F/UP, trend LFT    RENAL:  Follow up lytes.  Correct as needed    INFECTIOUS DISEASE: Follow up cultures. Continue with antibiotics for now. ID EVAL    HEMATOLOGICAL:  DVT prophylaxis.    ENDOCRINE:  Follow up FS.  Insulin protocol if needed.    MUSCULOSKELETAL: Out of bed to chair.    Transfer plate to hospitalist  recall as needed
Patient is a 77 y/o with PMHx of CAD ( previous PCI with intervention), ALTAF, COPD, Lung CA ( Had prior Lobectomy) , thyroid Ca ( had Thyroidectomy) , HLD, that presented to [facility] with abdominal pain, chest pain. Patient has elevation of T-Cuco along with ductal dilation noted on CT imaging. MRCP without stone noted. Clinically much improved.     Abnormal Liver function studies  - Liver functions improved  - MRCP without Choledocholithiasis  - Likely passed stone
r/o cholangitis, elevated TB = 2.9     PLAN:     - please continue to trend LFT's     - Please obtain MRCP, and consult GI as pt will need ERCP as well     - no surgical intervention at this time. will f/u for MRCP results

## 2019-05-09 NOTE — DISCHARGE NOTE NURSING/CASE MANAGEMENT/SOCIAL WORK - NSDCDPATPORTLINK_GEN_ALL_CORE
You can access the [facility] Patient Portal, offered by [facility], by registering with the following website: [email or web address] Health [email or web address]

## 2019-05-09 NOTE — PROGRESS NOTE ADULT - PROVIDER SPECIALTY LIST ADULT
Critical Care
Gastroenterology
Hospitalist
Hospitalist
Internal Medicine
Internal Medicine
Surgery
Internal Medicine

## 2019-05-09 NOTE — PROGRESS NOTE ADULT - SUBJECTIVE AND OBJECTIVE BOX
SUBJECTIVE:    Patient is a 78y old Male who presents with a chief complaint of chest and Abdominal pain (08 May 2019 17:17)    Currently admitted to medicine with the primary diagnosis of Ascending cholangitis     Today is hospital day 4d. No acute overnight events. We will follow up repeat blood culture and if negative, d/c patient home on antibiotics per ID's recommendations. Patient is asymptomatic this AM; denies abdominal pain, N/V/D/C. Jaundice resolved.     PAST MEDICAL & SURGICAL HISTORY  Osteopenia  Hyperuricemia  Lung cancer  Coronary artery disease involving native heart, angina presence unspecified, unspecified vessel or lesion type  ALTAF (obstructive sleep apnea)  Chronic obstructive pulmonary disease, unspecified COPD type  Dyslipidemia  Thyroid cancer  Hypertension, unspecified type  Gastroesophageal reflux disease, esophagitis presence not specified  H/O thyroidectomy  S/P lobectomy of lung    SOCIAL HISTORY:  Former smoker   Denies alcohol or illicit drug use     From (x ) home ( ) nursing home ( ) other   Ambulation status: (x ) ambulates independently ( ) ambulates with assistance ( ) ambulates with device ( ) dependent   Device: ( ) rolling walker ( ) cane     ALLERGIES:  No Known Allergies    MEDICATIONS:  STANDING MEDICATIONS  aspirin  chewable 81 milliGRAM(s) Oral daily  atorvastatin 10 milliGRAM(s) Oral at bedtime  cefepime   IVPB 1000 milliGRAM(s) IV Intermittent every 8 hours  chlorhexidine 4% Liquid 1 Application(s) Topical <User Schedule>  enoxaparin Injectable 40 milliGRAM(s) SubCutaneous every 24 hours  isosorbide   mononitrate ER Tablet (IMDUR) 30 milliGRAM(s) Oral daily  levothyroxine 137 MICROGram(s) Oral <User Schedule>  levothyroxine 150 MICROGram(s) Oral <User Schedule>  losartan 50 milliGRAM(s) Oral daily  magnesium sulfate  IVPB 2 Gram(s) IV Intermittent once  metoprolol succinate ER 50 milliGRAM(s) Oral daily  metroNIDAZOLE  IVPB 500 milliGRAM(s) IV Intermittent every 8 hours  montelukast 10 milliGRAM(s) Oral at bedtime  pantoprazole    Tablet 40 milliGRAM(s) Oral before breakfast    PRN MEDICATIONS  acetaminophen   Tablet .. 650 milliGRAM(s) Oral every 6 hours PRN  aluminum hydroxide/magnesium hydroxide/simethicone Suspension 30 milliLiter(s) Oral every 4 hours PRN    VITALS:   T(F): 98.3  HR: 76  BP: 167/74  RR: 20  SpO2: 95%    LABS:                        11.6   4.47  )-----------( 150      ( 09 May 2019 08:00 )             35.5     05-09    142  |  102  |  14  ----------------------------<  103<H>  3.9   |  28  |  0.8    Ca    8.0<L>      09 May 2019 08:00  Mg     1.7     05-09    TPro  5.4<L>  /  Alb  3.2<L>  /  TBili  0.9  /  DBili  0.5<H>  /  AST  34  /  ALT  67<H>  /  AlkPhos  281<H>  05-09    RADIOLOGY:  No new imaging studies ordered     PHYSICAL EXAM:  GEN: No acute distress, lying comfortably in bed  LUNGS: Clear to auscultation bilaterally, no labored breathing   HEART: S1/S2 present. RRR.   ABD: Soft, non-tender, non-distended. Bowel sounds present.   EXT: Noncyanotic, nonedematous, 2+ peripheral pulses, skin intact   NEURO: AAOX3, CN II-XII grossly intact

## 2019-05-09 NOTE — CONSULT NOTE ADULT - SUBJECTIVE AND OBJECTIVE BOX
RODRIGUEZHERIBERTO LEMONS  78y, Male  Allergy: No Known Allergies      CHIEF COMPLAINT: chest and Abdominal pain (09 May 2019 09:59)      HPI:  77 yo M PMH of Lung ca s/p lobectomy, Thyroid cancer, hx of COPD, CAD s/p PCI, HTN, DLD presenting with sharp abdominal and chest pain mid epigastric, not associated with food, waxing and weaning that started in February and the patient had no explanation for. He reports having a normal endoscopy and scotoscopy in 2018. He reports changed in his color of urine to very dark orange color. No naren colored stools no pruritis. Weight loss of 35 pounds in the last few months. In the ED patient had elevated Liver enzymes, Febrile and had tachycardia. CT showed CBD dilation. Given IV antibiotics and admitted for further evaluation. (05 May 2019 21:31)    FAMILY HISTORY:  Family history of prostate cancer (Sibling)  Family history of myocardial infarction (Father)  Family history of heart disease (Mother)    PAST MEDICAL & SURGICAL HISTORY:  Osteopenia  Hyperuricemia  Lung cancer  Coronary artery disease involving native heart, angina presence unspecified, unspecified vessel or lesion type  ALTAF (obstructive sleep apnea)  Chronic obstructive pulmonary disease, unspecified COPD type  Dyslipidemia  Thyroid cancer  Hypertension, unspecified type  Gastroesophageal reflux disease, esophagitis presence not specified  H/O thyroidectomy  S/P lobectomy of lung      ROS  General: Denies fevers, chills, nightsweats, weight loss  HEENT: Denies headache, rhinorrhea, sore throat, eye pain  CV: Denies CP, palpitations  PULM: Denies SOB, cough  GI: Denies abdominal pain, diarrhea  : Denies dysuria, hematuria  MSK: Denies arthralgias  SKIN: Denies skin   NEURO: Denies paresthesias, weakness  PSYCH: Denies depression    VITALS:  T(F): 98.3, Max: 98.4 (05-08-19 @ 21:21)  HR: 76  BP: 167/74  RR: 20Vital Signs Last 24 Hrs  T(C): 36.8 (09 May 2019 07:21), Max: 36.9 (08 May 2019 21:21)  T(F): 98.3 (09 May 2019 07:21), Max: 98.4 (08 May 2019 21:21)  HR: 76 (09 May 2019 07:21) (60 - 79)  BP: 167/74 (09 May 2019 06:26) (132/61 - 167/74)  BP(mean): --  RR: 20 (09 May 2019 06:26) (18 - 20)  SpO2: 95% (09 May 2019 06:26) (95% - 97%)    PHYSICAL EXAM:  Gen: NAD, resting in bed  HEENT: Normocephalic, atraumatic  Neck: supple, no lymphadenopathy  CV: Regular rate & regular rhythm  Lungs: decreased BS at bases, no fremitus  Abdomen: Soft, BS present  Ext: Warm, well perfused  Neuro: non focal, awake  Skin: no rash, no erythema    TESTS & MEASUREMENTS:                        11.6   4.47  )-----------( 150      ( 09 May 2019 08:00 )             35.5     05-09    142  |  102  |  14  ----------------------------<  103<H>  3.9   |  28  |  0.8    Ca    8.0<L>      09 May 2019 08:00  Mg     1.7     05-09    TPro  5.4<L>  /  Alb  3.2<L>  /  TBili  0.9  /  DBili  0.5<H>  /  AST  34  /  ALT  67<H>  /  AlkPhos  281<H>  05-09    eGFR if Non African American: 86 mL/min/1.73M2 (05-09-19 @ 08:00)  eGFR if : 99 mL/min/1.73M2 (05-09-19 @ 08:00)  eGFR if Non African American: 90 mL/min/1.73M2 (05-08-19 @ 12:23)  eGFR if African American: 105 mL/min/1.73M2 (05-08-19 @ 12:23)    LIVER FUNCTIONS - ( 09 May 2019 08:00 )  Alb: 3.2 g/dL / Pro: 5.4 g/dL / ALK PHOS: 281 U/L / ALT: 67 U/L / AST: 34 U/L / GGT: x               Culture - Urine (collected 05-05-19 @ 21:00)  Source: .Urine Clean Catch (Midstream)  Final Report (05-06-19 @ 23:14):    <10,000 CFU/mL Normal Urogenital Ching    Culture - Blood (collected 05-05-19 @ 19:29)  Source: .Blood Blood  Preliminary Report (05-07-19 @ 06:00):    No growth to date.    Culture - Blood (collected 05-05-19 @ 19:29)  Source: .Blood Blood  Gram Stain (05-06-19 @ 18:30):      Growth in aerobic bottle: Gram Negative Rods  Final Report (05-08-19 @ 11:54):    Growth in aerobic bottle: Escherichia coli    "Due to technical problems, Proteus sp. will Not be reported as part of    the BCID panel until further notice"    ***Blood Panel PCR results on this specimen are available    approximately 3 hours after the Gram stain result.***    Gram stain, PCR, and/or culture results may not always    correspond due to difference in methodologies.    ************************************************************    This PCR assay was performed using [facility].    The following targets are tested for: Enterococcus,    vancomycin resistant enterococci, Listeria monocytogenes,    coagulase negative staphylococci, S. aureus,    methicillin resistant S. aureus, Streptococcus agalactiae    (Group B), S. pneumoniae, S. pyogenes (Group A),    Acinetobacter baumannii, Enterobacter cloacae, E. coli,    Klebsiella oxytoca, K. pneumoniae, Proteus sp.,    Serratia marcescens, Haemophilus influenzae,    Neisseria meningitidis, Pseudomonas aeruginosa, Candida    albicans, C. glabrata, C krusei, C parapsilosis,    C. tropicalis and the KPC resistance gene.  Organism: Blood Culture PCR  Escherichia coli (05-08-19 @ 11:54)  Organism: Escherichia coli (05-08-19 @ 11:54)      -  Amikacin: S <=16      -  Ampicillin: R >16 These ampicillin results predict results for amoxicillin      -  Ampicillin/Sulbactam: R >16/8      -  Aztreonam: S <=4      -  Cefazolin: R >16      -  Cefepime: S <=4      -  Cefoxitin: S <=8      -  Ceftriaxone: S <=1 Enterobacter, Citrobacter, and Serratia may develop resistance during prolonged therapy      -  Ciprofloxacin: R >2      -  Ertapenem: S <=1      -  Gentamicin: S <=4      -  Imipenem: S <=1      -  Levofloxacin: R >4      -  Meropenem: S <=1      -  Piperacillin/Tazobactam: I 64      -  Tobramycin: S <=4      -  Trimethoprim/Sulfamethoxazole: R >2/38      Method Type: DARIN  Organism: Blood Culture PCR (05-08-19 @ 11:54)      -  Klebsiella pneumoniae: Detec      Method Type: PCR    Lactate, Blood: 1.4 mmol/L (05-06-19 @ 04:45)  Blood Gas Venous - Lactate: 1.2 mmoL/L (05-05-19 @ 20:47)  Blood Gas Venous - Lactate: 2.5 mmoL/L (05-05-19 @ 16:34)  Lactate, Blood: 2.7 mmol/L (05-05-19 @ 16:28)      INFECTIOUS DISEASES TESTING      CARDIOLOGY TESTING    Procedure:   2D Echo/Doppler/Color Doppler Complete.  Indications: R94.31 - Abnormal Electrocardiogram [ECG] [EKG]  Diagnosis:   Abnormal electrocardiogram [ECG] [EKG] - R94.31    Summary:   1. LV Ejection Fraction by Manjarrez's Method with a biplane EF of 65 %.   2. Mildly increased LV wall thickness.   3. Spectral Doppler shows impaired relaxation pattern of left   ventricular myocardial filling (Grade I diastolic dysfunction).   4. Mild aortic regurgitation.   5. Estimated pulmonary artery systolic pressure is 40.7 mmHg assuming a   right atrial pressure of 5 mmHg, which is consistent with mild pulmonary   hypertension.     (05-08-19 @ 09:22)  12 Lead ECG:   Ventricular Rate 104 BPM    QTC Calculation(Bezet) 473 ms    Diagnosis Line Sinus tachycardia  Left posterior fascicular block  LBBB    Abnormal ECG      MEDICATIONS  aspirin  chewable 81  atorvastatin 10  cefepime   IVPB 1000  chlorhexidine 4% Liquid 1  enoxaparin Injectable 40  isosorbide   mononitrate ER Tablet (IMDUR) 30  levothyroxine 137  levothyroxine 150  losartan 50  magnesium sulfate  IVPB 2  metoprolol succinate ER 50  metroNIDAZOLE  IVPB 500  montelukast 10  pantoprazole    Tablet 40    ANTIBIOTICS:  cefepime   IVPB 1000 milliGRAM(s) IV Intermittent every 8 hours  metroNIDAZOLE  IVPB 500 milliGRAM(s) IV Intermittent every 8 hours RODRIGUEZHERIBERTO LEMONS  78y, Male  Allergy: No Known Allergies    CHIEF COMPLAINT: chest and Abdominal pain (09 May 2019 09:59)    HPI:79 yo M PMH of Lung ca s/p lobectomy, Thyroid cancer, hx of COPD, CAD s/p PCI, HTN, DLD presenting with sharp abdominal and chest pain mid epigastric, not associated with food, waxing and weaning that started in February and the patient had no explanation for. He reports having a normal endoscopy and scotoscopy in 2018. He reports changed in his color of urine to very dark orange color. No naren colored stools no pruritis. Weight loss of 35 pounds in the last few months. In the ED patient had elevated Liver enzymes, Febrile and had tachycardia. CT showed CBD dilation. Given IV antibiotics and admitted for further evaluation. (05 May 2019 21:31)    FAMILY HISTORY:  Family history of prostate cancer (Sibling)  Family history of myocardial infarction (Father)  Family history of heart disease (Mother)    PAST MEDICAL & SURGICAL HISTORY:  Osteopenia  Hyperuricemia  Lung cancer  Coronary artery disease involving native heart, angina presence unspecified, unspecified vessel or lesion type  ALTAF (obstructive sleep apnea)  Chronic obstructive pulmonary disease, unspecified COPD type  Dyslipidemia  Thyroid cancer  Hypertension, unspecified type  Gastroesophageal reflux disease, esophagitis presence not specified  H/O thyroidectomy  S/P lobectomy of lung      ROS  General: Denies fevers, chills, nightsweats, weight loss  HEENT: Denies headache, rhinorrhea, sore throat, eye pain  CV: Denies CP, palpitations  PULM: Denies SOB, cough  GI: Denies abdominal pain, diarrhea  : Denies dysuria, hematuria  MSK: Denies arthralgias  SKIN: Denies skin   NEURO: Denies paresthesias, weakness  PSYCH: Denies depression    VITALS:  T(F): 98.3, Max: 98.4 (05-08-19 @ 21:21)  HR: 76  BP: 167/74  RR: 20Vital Signs Last 24 Hrs  T(C): 36.8 (09 May 2019 07:21), Max: 36.9 (08 May 2019 21:21)  T(F): 98.3 (09 May 2019 07:21), Max: 98.4 (08 May 2019 21:21)  HR: 76 (09 May 2019 07:21) (60 - 79)  BP: 167/74 (09 May 2019 06:26) (132/61 - 167/74)  BP(mean): --  RR: 20 (09 May 2019 06:26) (18 - 20)  SpO2: 95% (09 May 2019 06:26) (95% - 97%)    PHYSICAL EXAM:  Gen: NAD, resting in bed  HEENT: Normocephalic, atraumatic  Neck: supple, no lymphadenopathy  CV: Regular rate & regular rhythm, GI SHAKIRA  Lungs: decreased BS at bases, no fremitus  Abdomen: Soft, BS present, no abd tenderness  Ext: Warm, well perfused  Neuro: non focal, awake  Skin: no rash, no erythema    TESTS & MEASUREMENTS:                        11.6   4.47  )-----------( 150      ( 09 May 2019 08:00 )             35.5     05-09    142  |  102  |  14  ----------------------------<  103<H>  3.9   |  28  |  0.8    Ca    8.0<L>      09 May 2019 08:00  Mg     1.7     05-09    TPro  5.4<L>  /  Alb  3.2<L>  /  TBili  0.9  /  DBili  0.5<H>  /  AST  34  /  ALT  67<H>  /  AlkPhos  281<H>  05-09    eGFR if Non African American: 86 mL/min/1.73M2 (05-09-19 @ 08:00)  eGFR if : 99 mL/min/1.73M2 (05-09-19 @ 08:00)  eGFR if Non African American: 90 mL/min/1.73M2 (05-08-19 @ 12:23)  eGFR if African American: 105 mL/min/1.73M2 (05-08-19 @ 12:23)    LIVER FUNCTIONS - ( 09 May 2019 08:00 )  Alb: 3.2 g/dL / Pro: 5.4 g/dL / ALK PHOS: 281 U/L / ALT: 67 U/L / AST: 34 U/L / GGT: x           Culture - Urine (collected 05-05-19 @ 21:00)  Source: .Urine Clean Catch (Midstream)  Final Report (05-06-19 @ 23:14):    <10,000 CFU/mL Normal Urogenital Ching    Culture - Blood (collected 05-05-19 @ 19:29)  Source: .Blood Blood  Preliminary Report (05-07-19 @ 06:00):    No growth to date.    Culture - Blood (collected 05-05-19 @ 19:29)  Source: .Blood Blood  Gram Stain (05-06-19 @ 18:30):      Growth in aerobic bottle: Gram Negative Rods  Final Report (05-08-19 @ 11:54):    Growth in aerobic bottle: Escherichia coli    "Due to technical problems, Proteus sp. will Not be reported as part of    the BCID panel until further notice"    ***Blood Panel PCR results on this specimen are available    approximately 3 hours after the Gram stain result.***    Gram stain, PCR, and/or culture results may not always    correspond due to difference in methodologies.    ************************************************************    This PCR assay was performed using [employer].    The following targets are tested for: Enterococcus,    vancomycin resistant enterococci, Listeria monocytogenes,    coagulase negative staphylococci, S. aureus,    methicillin resistant S. aureus, Streptococcus agalactiae    (Group B), S. pneumoniae, S. pyogenes (Group A),    Acinetobacter baumannii, Enterobacter cloacae, E. coli,    Klebsiella oxytoca, K. pneumoniae, Proteus sp.,    Serratia marcescens, Haemophilus influenzae,    Neisseria meningitidis, Pseudomonas aeruginosa, Candida    albicans, C. glabrata, C krusei, C parapsilosis,    C. tropicalis and the KPC resistance gene.  Organism: Blood Culture PCR  Escherichia coli (05-08-19 @ 11:54)  Organism: Escherichia coli (05-08-19 @ 11:54)      -  Amikacin: S <=16      -  Ampicillin: R >16 These ampicillin results predict results for amoxicillin      -  Ampicillin/Sulbactam: R >16/8      -  Aztreonam: S <=4      -  Cefazolin: R >16      -  Cefepime: S <=4      -  Cefoxitin: S <=8      -  Ceftriaxone: S <=1 Enterobacter, Citrobacter, and Serratia may develop resistance during prolonged therapy      -  Ciprofloxacin: R >2      -  Ertapenem: S <=1      -  Gentamicin: S <=4      -  Imipenem: S <=1      -  Levofloxacin: R >4      -  Meropenem: S <=1      -  Piperacillin/Tazobactam: I 64      -  Tobramycin: S <=4      -  Trimethoprim/Sulfamethoxazole: R >2/38      Method Type: DARIN  Organism: Blood Culture PCR (05-08-19 @ 11:54)      -  Klebsiella pneumoniae: Detec      Method Type: PCR    Lactate, Blood: 1.4 mmol/L (05-06-19 @ 04:45)  Blood Gas Venous - Lactate: 1.2 mmoL/L (05-05-19 @ 20:47)  Blood Gas Venous - Lactate: 2.5 mmoL/L (05-05-19 @ 16:34)  Lactate, Blood: 2.7 mmol/L (05-05-19 @ 16:28)      INFECTIOUS DISEASES TESTING      CARDIOLOGY TESTING    Procedure:   2D Echo/Doppler/Color Doppler Complete.  Indications: R94.31 - Abnormal Electrocardiogram [ECG] [EKG]  Diagnosis:   Abnormal electrocardiogram [ECG] [EKG] - R94.31    Summary:   1. LV Ejection Fraction by Manjarrez's Method with a biplane EF of 65 %.   2. Mildly increased LV wall thickness.   3. Spectral Doppler shows impaired relaxation pattern of left   ventricular myocardial filling (Grade I diastolic dysfunction).   4. Mild aortic regurgitation.   5. Estimated pulmonary artery systolic pressure is 40.7 mmHg assuming a   right atrial pressure of 5 mmHg, which is consistent with mild pulmonary   hypertension.     (05-08-19 @ 09:22)  12 Lead ECG:   Ventricular Rate 104 BPM    QTC Calculation(Bezet) 473 ms    Diagnosis Line Sinus tachycardia  Left posterior fascicular block  LBBB    Abnormal ECG      MEDICATIONS  aspirin  chewable 81  atorvastatin 10  cefepime   IVPB 1000  chlorhexidine 4% Liquid 1  enoxaparin Injectable 40  isosorbide   mononitrate ER Tablet (IMDUR) 30  levothyroxine 137  levothyroxine 150  losartan 50  magnesium sulfate  IVPB 2  metoprolol succinate ER 50  metroNIDAZOLE  IVPB 500  montelukast 10  pantoprazole    Tablet 40    ANTIBIOTICS:  cefepime   IVPB 1000 milliGRAM(s) IV Intermittent every 8 hours  metroNIDAZOLE  IVPB 500 milliGRAM(s) IV Intermittent every 8 hours

## 2019-05-09 NOTE — PROGRESS NOTE ADULT - REASON FOR ADMISSION
chest and Abdominal pain

## 2019-05-09 NOTE — DISCHARGE NOTE NURSING/CASE MANAGEMENT/SOCIAL WORK - NSDCPEEMAIL_GEN_ALL_CORE
[facility] email [email or web address]

## 2019-05-09 NOTE — CONSULT NOTE ADULT - CONSULT REASON
78M with acute cholangitis, resolved, and E. coli/Klebsiella bacteremia on IV abx. Planning d/c, will need midline. Appreciate recommendations on abx dosing/duration for d/c
Abdominal Pain  Abnormal liver function studies
Cholangitis
cholangitis
abd  chest pain

## 2019-05-10 RX ORDER — METRONIDAZOLE 500 MG
1 TABLET ORAL
Qty: 12 | Refills: 0
Start: 2019-05-10 | End: 2019-05-15

## 2019-05-10 RX ORDER — CEFPODOXIME PROXETIL 100 MG
1 TABLET ORAL
Qty: 12 | Refills: 0
Start: 2019-05-10 | End: 2019-05-15

## 2019-05-11 LAB
CULTURE RESULTS: SIGNIFICANT CHANGE UP
SPECIMEN SOURCE: SIGNIFICANT CHANGE UP

## 2019-05-13 DIAGNOSIS — E78.5 HYPERLIPIDEMIA, UNSPECIFIED: ICD-10-CM

## 2019-05-13 DIAGNOSIS — I10 ESSENTIAL (PRIMARY) HYPERTENSION: ICD-10-CM

## 2019-05-13 DIAGNOSIS — R63.4 ABNORMAL WEIGHT LOSS: ICD-10-CM

## 2019-05-13 DIAGNOSIS — I27.20 PULMONARY HYPERTENSION, UNSPECIFIED: ICD-10-CM

## 2019-05-13 DIAGNOSIS — Z85.118 PERSONAL HISTORY OF OTHER MALIGNANT NEOPLASM OF BRONCHUS AND LUNG: ICD-10-CM

## 2019-05-13 DIAGNOSIS — Z85.850 PERSONAL HISTORY OF MALIGNANT NEOPLASM OF THYROID: ICD-10-CM

## 2019-05-13 DIAGNOSIS — B96.1 KLEBSIELLA PNEUMONIAE [K. PNEUMONIAE] AS THE CAUSE OF DISEASES CLASSIFIED ELSEWHERE: ICD-10-CM

## 2019-05-13 DIAGNOSIS — K21.9 GASTRO-ESOPHAGEAL REFLUX DISEASE WITHOUT ESOPHAGITIS: ICD-10-CM

## 2019-05-13 DIAGNOSIS — E89.0 POSTPROCEDURAL HYPOTHYROIDISM: ICD-10-CM

## 2019-05-13 DIAGNOSIS — I25.10 ATHEROSCLEROTIC HEART DISEASE OF NATIVE CORONARY ARTERY WITHOUT ANGINA PECTORIS: ICD-10-CM

## 2019-05-13 DIAGNOSIS — J44.9 CHRONIC OBSTRUCTIVE PULMONARY DISEASE, UNSPECIFIED: ICD-10-CM

## 2019-05-13 DIAGNOSIS — K83.09 OTHER CHOLANGITIS: ICD-10-CM

## 2019-05-13 DIAGNOSIS — R10.9 UNSPECIFIED ABDOMINAL PAIN: ICD-10-CM

## 2019-05-13 DIAGNOSIS — D64.9 ANEMIA, UNSPECIFIED: ICD-10-CM

## 2019-05-13 DIAGNOSIS — A41.59 OTHER GRAM-NEGATIVE SEPSIS: ICD-10-CM

## 2019-05-13 DIAGNOSIS — Z90.2 ACQUIRED ABSENCE OF LUNG [PART OF]: ICD-10-CM

## 2019-05-13 DIAGNOSIS — D13.7 BENIGN NEOPLASM OF ENDOCRINE PANCREAS: ICD-10-CM

## 2019-05-13 DIAGNOSIS — Z87.891 PERSONAL HISTORY OF NICOTINE DEPENDENCE: ICD-10-CM

## 2019-05-13 LAB
CULTURE RESULTS: SIGNIFICANT CHANGE UP
SPECIMEN SOURCE: SIGNIFICANT CHANGE UP

## 2019-05-20 ENCOUNTER — OUTPATIENT (OUTPATIENT)
Dept: OUTPATIENT SERVICES | Facility: HOSPITAL | Age: 79
LOS: 1 days | Discharge: HOME | End: 2019-05-20

## 2019-05-20 ENCOUNTER — APPOINTMENT (OUTPATIENT)
Dept: SURGERY | Facility: CLINIC | Age: 79
End: 2019-05-20
Payer: MEDICARE

## 2019-05-20 VITALS
WEIGHT: 220 LBS | BODY MASS INDEX: 29.16 KG/M2 | DIASTOLIC BLOOD PRESSURE: 82 MMHG | HEIGHT: 73 IN | SYSTOLIC BLOOD PRESSURE: 126 MMHG

## 2019-05-20 DIAGNOSIS — Z90.2 ACQUIRED ABSENCE OF LUNG [PART OF]: Chronic | ICD-10-CM

## 2019-05-20 DIAGNOSIS — K83.09 OTHER CHOLANGITIS: ICD-10-CM

## 2019-05-20 DIAGNOSIS — E89.0 POSTPROCEDURAL HYPOTHYROIDISM: Chronic | ICD-10-CM

## 2019-05-20 LAB
ALBUMIN SERPL ELPH-MCNC: 4 G/DL
ALP BLD-CCNC: 859 U/L
ALT SERPL-CCNC: 236 U/L
ANION GAP SERPL CALC-SCNC: 19 MMOL/L
AST SERPL-CCNC: 192 U/L
BASOPHILS # BLD AUTO: 0.03 K/UL
BASOPHILS NFR BLD AUTO: 0.4 %
BILIRUB SERPL-MCNC: 1.2 MG/DL
BUN SERPL-MCNC: 13 MG/DL
CALCIUM SERPL-MCNC: 8.4 MG/DL
CHLORIDE SERPL-SCNC: 100 MMOL/L
CO2 SERPL-SCNC: 24 MMOL/L
CREAT SERPL-MCNC: 1 MG/DL
EOSINOPHIL # BLD AUTO: 0.06 K/UL
EOSINOPHIL NFR BLD AUTO: 0.8 %
GLUCOSE SERPL-MCNC: 99 MG/DL
HCT VFR BLD CALC: 41 %
HGB BLD-MCNC: 13 G/DL
IMM GRANULOCYTES NFR BLD AUTO: 0.5 %
LYMPHOCYTES # BLD AUTO: 0.59 K/UL
LYMPHOCYTES NFR BLD AUTO: 7.9 %
MAN DIFF?: NORMAL
MCHC RBC-ENTMCNC: 27.8 PG
MCHC RBC-ENTMCNC: 31.7 G/DL
MCV RBC AUTO: 87.8 FL
MONOCYTES # BLD AUTO: 0.55 K/UL
MONOCYTES NFR BLD AUTO: 7.4 %
NEUTROPHILS # BLD AUTO: 6.17 K/UL
NEUTROPHILS NFR BLD AUTO: 83 %
PLATELET # BLD AUTO: 203 K/UL
POTASSIUM SERPL-SCNC: 3.9 MMOL/L
PROT SERPL-MCNC: 6.7 G/DL
RBC # BLD: 4.67 M/UL
RBC # FLD: 15.8 %
SODIUM SERPL-SCNC: 143 MMOL/L
WBC # FLD AUTO: 7.44 K/UL

## 2019-05-20 PROCEDURE — 99214 OFFICE O/P EST MOD 30 MIN: CPT

## 2019-05-21 ENCOUNTER — INPATIENT (INPATIENT)
Facility: HOSPITAL | Age: 79
LOS: 3 days | Discharge: HOME | End: 2019-05-25
Attending: INTERNAL MEDICINE | Admitting: INTERNAL MEDICINE
Payer: MEDICARE

## 2019-05-21 VITALS
RESPIRATION RATE: 20 BRPM | DIASTOLIC BLOOD PRESSURE: 55 MMHG | SYSTOLIC BLOOD PRESSURE: 109 MMHG | HEART RATE: 77 BPM | OXYGEN SATURATION: 97 % | TEMPERATURE: 95 F

## 2019-05-21 DIAGNOSIS — Z90.2 ACQUIRED ABSENCE OF LUNG [PART OF]: Chronic | ICD-10-CM

## 2019-05-21 DIAGNOSIS — E89.0 POSTPROCEDURAL HYPOTHYROIDISM: Chronic | ICD-10-CM

## 2019-05-21 LAB
ALBUMIN SERPL ELPH-MCNC: 3.6 G/DL — SIGNIFICANT CHANGE UP (ref 3.5–5.2)
ALBUMIN SERPL ELPH-MCNC: 3.7 G/DL — SIGNIFICANT CHANGE UP (ref 3.5–5.2)
ALP SERPL-CCNC: 597 U/L — HIGH (ref 30–115)
ALP SERPL-CCNC: 677 U/L — HIGH (ref 30–115)
ALT FLD-CCNC: 138 U/L — HIGH (ref 0–41)
ALT FLD-CCNC: 158 U/L — HIGH (ref 0–41)
ANION GAP SERPL CALC-SCNC: 15 MMOL/L — HIGH (ref 7–14)
AST SERPL-CCNC: 112 U/L — HIGH (ref 0–41)
AST SERPL-CCNC: 70 U/L — HIGH (ref 0–41)
BASOPHILS # BLD AUTO: 0.04 K/UL — SIGNIFICANT CHANGE UP (ref 0–0.2)
BASOPHILS NFR BLD AUTO: 0.9 % — SIGNIFICANT CHANGE UP (ref 0–1)
BILIRUB DIRECT SERPL-MCNC: 0.2 MG/DL — SIGNIFICANT CHANGE UP (ref 0–0.2)
BILIRUB DIRECT SERPL-MCNC: 0.4 MG/DL — HIGH (ref 0–0.2)
BILIRUB INDIRECT FLD-MCNC: 0.3 MG/DL — SIGNIFICANT CHANGE UP (ref 0.2–1.2)
BILIRUB INDIRECT FLD-MCNC: 0.5 MG/DL — SIGNIFICANT CHANGE UP (ref 0.2–1.2)
BILIRUB SERPL-MCNC: 0.7 MG/DL — SIGNIFICANT CHANGE UP (ref 0.2–1.2)
BILIRUB SERPL-MCNC: 0.7 MG/DL — SIGNIFICANT CHANGE UP (ref 0.2–1.2)
BUN SERPL-MCNC: 16 MG/DL — SIGNIFICANT CHANGE UP (ref 10–20)
CALCIUM SERPL-MCNC: 9.1 MG/DL — SIGNIFICANT CHANGE UP (ref 8.5–10.1)
CHLORIDE SERPL-SCNC: 98 MMOL/L — SIGNIFICANT CHANGE UP (ref 98–110)
CK SERPL-CCNC: 86 U/L — SIGNIFICANT CHANGE UP (ref 0–225)
CO2 SERPL-SCNC: 24 MMOL/L — SIGNIFICANT CHANGE UP (ref 17–32)
CREAT SERPL-MCNC: 1.2 MG/DL — SIGNIFICANT CHANGE UP (ref 0.7–1.5)
EOSINOPHIL # BLD AUTO: 0.16 K/UL — SIGNIFICANT CHANGE UP (ref 0–0.7)
EOSINOPHIL NFR BLD AUTO: 3.8 % — SIGNIFICANT CHANGE UP (ref 0–8)
GLUCOSE SERPL-MCNC: 104 MG/DL — HIGH (ref 70–99)
HCT VFR BLD CALC: 37.5 % — LOW (ref 42–52)
HGB BLD-MCNC: 12.2 G/DL — LOW (ref 14–18)
IMM GRANULOCYTES NFR BLD AUTO: 0.5 % — HIGH (ref 0.1–0.3)
LACTATE SERPL-SCNC: 1.6 MMOL/L — SIGNIFICANT CHANGE UP (ref 0.5–2.2)
LIDOCAIN IGE QN: 30 U/L — SIGNIFICANT CHANGE UP (ref 7–60)
LPL SERPL-CCNC: 16 U/L
LYMPHOCYTES # BLD AUTO: 0.95 K/UL — LOW (ref 1.2–3.4)
LYMPHOCYTES # BLD AUTO: 22.5 % — SIGNIFICANT CHANGE UP (ref 20.5–51.1)
MAGNESIUM SERPL-MCNC: 1.9 MG/DL — SIGNIFICANT CHANGE UP (ref 1.8–2.4)
MCHC RBC-ENTMCNC: 27.9 PG — SIGNIFICANT CHANGE UP (ref 27–31)
MCHC RBC-ENTMCNC: 32.5 G/DL — SIGNIFICANT CHANGE UP (ref 32–37)
MCV RBC AUTO: 85.8 FL — SIGNIFICANT CHANGE UP (ref 80–94)
MONOCYTES # BLD AUTO: 0.84 K/UL — HIGH (ref 0.1–0.6)
MONOCYTES NFR BLD AUTO: 19.9 % — HIGH (ref 1.7–9.3)
NEUTROPHILS # BLD AUTO: 2.22 K/UL — SIGNIFICANT CHANGE UP (ref 1.4–6.5)
NEUTROPHILS NFR BLD AUTO: 52.4 % — SIGNIFICANT CHANGE UP (ref 42.2–75.2)
NRBC # BLD: 0 /100 WBCS — SIGNIFICANT CHANGE UP (ref 0–0)
PLATELET # BLD AUTO: 223 K/UL — SIGNIFICANT CHANGE UP (ref 130–400)
POTASSIUM SERPL-MCNC: 5.9 MMOL/L — HIGH (ref 3.5–5)
POTASSIUM SERPL-SCNC: 5.9 MMOL/L — HIGH (ref 3.5–5)
PROT SERPL-MCNC: 6.1 G/DL — SIGNIFICANT CHANGE UP (ref 6–8)
PROT SERPL-MCNC: 7 G/DL — SIGNIFICANT CHANGE UP (ref 6–8)
RBC # BLD: 4.37 M/UL — LOW (ref 4.7–6.1)
RBC # FLD: 15.3 % — HIGH (ref 11.5–14.5)
SODIUM SERPL-SCNC: 137 MMOL/L — SIGNIFICANT CHANGE UP (ref 135–146)
TROPONIN T SERPL-MCNC: <0.01 NG/ML — SIGNIFICANT CHANGE UP
WBC # BLD: 4.23 K/UL — LOW (ref 4.8–10.8)
WBC # FLD AUTO: 4.23 K/UL — LOW (ref 4.8–10.8)

## 2019-05-21 PROCEDURE — 99285 EMERGENCY DEPT VISIT HI MDM: CPT

## 2019-05-21 PROCEDURE — 76705 ECHO EXAM OF ABDOMEN: CPT | Mod: 26

## 2019-05-21 PROCEDURE — 99222 1ST HOSP IP/OBS MODERATE 55: CPT

## 2019-05-21 PROCEDURE — 93010 ELECTROCARDIOGRAM REPORT: CPT

## 2019-05-21 RX ORDER — ATORVASTATIN CALCIUM 80 MG/1
10 TABLET, FILM COATED ORAL AT BEDTIME
Refills: 0 | Status: DISCONTINUED | OUTPATIENT
Start: 2019-05-21 | End: 2019-05-22

## 2019-05-21 RX ORDER — MONTELUKAST 4 MG/1
10 TABLET, CHEWABLE ORAL DAILY
Refills: 0 | Status: DISCONTINUED | OUTPATIENT
Start: 2019-05-21 | End: 2019-05-25

## 2019-05-21 RX ORDER — ASPIRIN/CALCIUM CARB/MAGNESIUM 324 MG
81 TABLET ORAL DAILY
Refills: 0 | Status: DISCONTINUED | OUTPATIENT
Start: 2019-05-21 | End: 2019-05-24

## 2019-05-21 RX ORDER — PANTOPRAZOLE SODIUM 20 MG/1
40 TABLET, DELAYED RELEASE ORAL EVERY 12 HOURS
Refills: 0 | Status: DISCONTINUED | OUTPATIENT
Start: 2019-05-21 | End: 2019-05-25

## 2019-05-21 RX ORDER — LEVOTHYROXINE SODIUM 125 MCG
137 TABLET ORAL DAILY
Refills: 0 | Status: DISCONTINUED | OUTPATIENT
Start: 2019-05-21 | End: 2019-05-25

## 2019-05-21 RX ORDER — ENOXAPARIN SODIUM 100 MG/ML
40 INJECTION SUBCUTANEOUS AT BEDTIME
Refills: 0 | Status: DISCONTINUED | OUTPATIENT
Start: 2019-05-21 | End: 2019-05-25

## 2019-05-21 RX ORDER — CHLORHEXIDINE GLUCONATE 213 G/1000ML
1 SOLUTION TOPICAL
Refills: 0 | Status: DISCONTINUED | OUTPATIENT
Start: 2019-05-21 | End: 2019-05-25

## 2019-05-21 RX ORDER — ISOSORBIDE MONONITRATE 60 MG/1
30 TABLET, EXTENDED RELEASE ORAL DAILY
Refills: 0 | Status: DISCONTINUED | OUTPATIENT
Start: 2019-05-21 | End: 2019-05-25

## 2019-05-21 RX ORDER — METOPROLOL TARTRATE 50 MG
50 TABLET ORAL DAILY
Refills: 0 | Status: DISCONTINUED | OUTPATIENT
Start: 2019-05-21 | End: 2019-05-25

## 2019-05-21 RX ORDER — LOSARTAN POTASSIUM 100 MG/1
50 TABLET, FILM COATED ORAL DAILY
Refills: 0 | Status: DISCONTINUED | OUTPATIENT
Start: 2019-05-21 | End: 2019-05-21

## 2019-05-21 RX ORDER — BUDESONIDE AND FORMOTEROL FUMARATE DIHYDRATE 160; 4.5 UG/1; UG/1
2 AEROSOL RESPIRATORY (INHALATION)
Refills: 0 | Status: DISCONTINUED | OUTPATIENT
Start: 2019-05-21 | End: 2019-05-24

## 2019-05-21 RX ORDER — ALLOPURINOL 300 MG
100 TABLET ORAL
Refills: 0 | Status: DISCONTINUED | OUTPATIENT
Start: 2019-05-21 | End: 2019-05-22

## 2019-05-21 RX ADMIN — ENOXAPARIN SODIUM 40 MILLIGRAM(S): 100 INJECTION SUBCUTANEOUS at 21:40

## 2019-05-21 RX ADMIN — BUDESONIDE AND FORMOTEROL FUMARATE DIHYDRATE 2 PUFF(S): 160; 4.5 AEROSOL RESPIRATORY (INHALATION) at 21:39

## 2019-05-21 RX ADMIN — ATORVASTATIN CALCIUM 10 MILLIGRAM(S): 80 TABLET, FILM COATED ORAL at 21:40

## 2019-05-21 RX ADMIN — PANTOPRAZOLE SODIUM 40 MILLIGRAM(S): 20 TABLET, DELAYED RELEASE ORAL at 17:49

## 2019-05-21 RX ADMIN — Medication 100 MILLIGRAM(S): at 17:49

## 2019-05-21 NOTE — CONSULT NOTE ADULT - SUBJECTIVE AND OBJECTIVE BOX
Surgery Consultation Note  =====================================================  HPI: 78y Male  HPI:  On May 5th,  77 yo M PMH of Lung ca s/p lobectomy, Thyroid cancer, hx of COPD, CAD s/p PCI, HTN, DLD presenting with sharp abdominal and chest pain mid epigastric, not associated with food, waxing and weaning that started in February and the patient had no explanation for. He reports having a normal endoscopy and scotoscopy in 2018. He reports changed in his color of urine to very dark orange color. No naren colored stools no pruritis. Weight loss of 35 pounds in the last few months. In the ED patient had elevated Liver enzymes, Febrile and had tachycardia. CT showed CBD dilation. Given IV antibiotics and admitted for further evaluation of ascending cholangitis, treated conservatively and subsequently discharged. Pt followed up with dr hernandez yesterday in clinic and found to have labs with elev ALP, ALT and AST. nl Tbili. No abd pain today. no fever. + vomiting. Patient was sent in to the ED for ERCP.     PAST MEDICAL & SURGICAL HISTORY:  Osteopenia  Hyperuricemia  Lung cancer  Coronary artery disease involving native heart, angina presence unspecified, unspecified vessel or lesion type  ALTAF (obstructive sleep apnea)  Chronic obstructive pulmonary disease, unspecified COPD type  Dyslipidemia  Thyroid cancer  Hypertension, unspecified type  Gastroesophageal reflux disease, esophagitis presence not specified  H/O thyroidectomy  S/P lobectomy of lung    Home Meds: Home Medications:  allopurinol 100 mg oral tablet: 1 tab(s) orally 2 times a day (05 May 2019 21:43)  aspirin 81 mg oral tablet, chewable: 1 tab(s) orally once a day (09 May 2019 15:00)  atorvastatin 10 mg oral tablet: 1 tab(s) orally once a day (at bedtime) (09 May 2019 15:00)  Breo Ellipta 100 mcg-25 mcg/inh inhalation powder: 1 puff(s) inhaled once a day (05 May 2019 21:43)  Calcium 600+D oral tablet: 1 tab(s) orally 2 times a day (05 May 2019 21:43)  isosorbide mononitrate 30 mg oral tablet, extended release: 1 tab(s) orally once a day (in the morning) (05 May 2019 21:43)  Metoprolol Succinate ER 50 mg oral tablet, extended release: 1 tab(s) orally once a day (05 May 2019 21:43)  montelukast 10 mg oral tablet: 1 tab(s) orally once a day (05 May 2019 21:43)  Protonix 40 mg oral delayed release tablet: 40 milligram(s) orally 2 times a day (05 May 2019 21:43)  Synthroid 137 mcg (0.137 mg) oral tablet: 1 tab(s) orally 3 times a week (05 May 2019 21:43)  Synthroid 150 mcg (0.15 mg) oral tablet: 1 tab(s) orally 4 times a week (05 May 2019 21:43)  valsartan 160 mg oral capsule: 1 tab(s) orally once a day (05 May 2019 21:43)    Allergies: Allergies    No Known Allergies    Intolerances    Soc:   Advanced Directives: Presumed Full Code     ROS:    REVIEW OF SYSTEMS    [x] A ten-point review of systems was otherwise negative except as noted.  [ ] Due to altered mental status/intubation, subjective information were not able to be obtained from the patient. History was obtained, to the extent possible, from review of the chart and collateral sources of information.      CURRENT MEDICATIONS:   --------------------------------------------------------------------------------------  VITAL SIGNS, INS/OUTS (last 24 hours):  --------------------------------------------------------------------------------------  ICU Vital Signs Last 24 Hrs  T(C): 35.2 (21 May 2019 12:56), Max: 35.2 (21 May 2019 12:56)  T(F): 95.3 (21 May 2019 12:56), Max: 95.3 (21 May 2019 12:56)  HR: 77 (21 May 2019 12:56) (77 - 77)  BP: 109/55 (21 May 2019 12:56) (109/55 - 109/55)  BP(mean): --  ABP: --  ABP(mean): --  RR: 20 (21 May 2019 12:56) (20 - 20)  SpO2: 97% (21 May 2019 12:56) (97% - 97%)    I&O's Summary    --------------------------------------------------------------------------------------  EXAM:  General/Neuro    Exam: Normal, NAD, alert, oriented x 3, no focal deficits. PERRLA      Respiratory  Exam: Lungs clear to auscultation, Normal expansion/effort.    [] Tracheostomy   [] Intubated  Mechanical Ventilation:     Cardiovascular  Exam: S1, S2.  Regular rate and rhythm.   Cardiac Rhythm: Normal Sinus Rhythm  ECHO:     GI  Exam: Abdomen soft, Non-tender, Non-distended.    Current Diet:  NPO    Tubes/Lines/Drains    [x] Peripheral IV  [] Central Venous Line     	[] R	[] L	[] IJ	[] Fem	[] SC        Type:	    Date Placed:   [] Arterial Line		[] R	[] L	[] Fem	[] Rad	[] Ax	Date Placed:   [] PICC:         	[] Midline		[] Mediport           [] Urinary Catheter		Date Placed:     Extremities  Exam: Extremities warm, pink, well-perfused.        Derm:  Exam: Good skin turgor, no skin breakdown.        LABS  --------------------------------------------------------------------------------------  Labs:  CAPILLARY BLOOD GLUCOSE                        12.2   4.23  )-----------( 223      ( 21 May 2019 13:41 )             37.5       Auto Neutrophil %: 52.4 % (05-21-19 @ 13:41)  Auto Immature Granulocyte %: 0.5 % (05-21-19 @ 13:41)      LFTs:     Lactate, Blood: 1.6 mmol/L (05-21-19 @ 13:41)      Coags:    CARDIAC MARKERS ( 21 May 2019 13:41 )  x     / <0.01 ng/mL / x     / x     / x        --------------------------------------------------------------------------------------    OTHER LABS    IMAGING RESULTS      ASSESSMENT:  78y Male with transaminitis    PLAN:   - GI consult  - Trend LFT's  - ERCP  - NPO  - IVF  - Surgery will follow Surgery Consultation Note  =====================================================  HPI: 78y Male  HPI:  On May 5th, 79 yo M PMH of Lung ca s/p lobectomy, Thyroid cancer, hx of COPD, CAD s/p PCI, HTN, DLD presented to the ED with sharp abdominal and chest pain mid epigastric, not associated with food, waxing and waning that started in February that the patient had no explanation for. He reporteed having a normal endoscopy in 2018. He reports changed in his color of urine to very dark orange color. No naren colored stools no pruritis. Weight loss of 35 pounds in the last few months. In the ED patient had elevated Liver enzymes, Febrile and had tachycardia. CT showed CBD dilation. Given IV antibiotics and admitted for further evaluation of ascending cholangitis, treated appropriately and subsequently discharged. Pt followed up with dr hernandez yesterday in clinic where he was found to have labs with elev ALP, ALT and AST. nl Tbili. No abd pain today. no fever. + vomiting. Patient was sent in to the ED for ERCP.     PAST MEDICAL & SURGICAL HISTORY:  Osteopenia  Hyperuricemia  Lung cancer  Coronary artery disease involving native heart, angina presence unspecified, unspecified vessel or lesion type  ALTAF (obstructive sleep apnea)  Chronic obstructive pulmonary disease, unspecified COPD type  Dyslipidemia  Thyroid cancer  Hypertension, unspecified type  Gastroesophageal reflux disease, esophagitis presence not specified  H/O thyroidectomy  S/P lobectomy of lung    Home Meds: Home Medications:  allopurinol 100 mg oral tablet: 1 tab(s) orally 2 times a day (05 May 2019 21:43)  aspirin 81 mg oral tablet, chewable: 1 tab(s) orally once a day (09 May 2019 15:00)  atorvastatin 10 mg oral tablet: 1 tab(s) orally once a day (at bedtime) (09 May 2019 15:00)  Breo Ellipta 100 mcg-25 mcg/inh inhalation powder: 1 puff(s) inhaled once a day (05 May 2019 21:43)  Calcium 600+D oral tablet: 1 tab(s) orally 2 times a day (05 May 2019 21:43)  isosorbide mononitrate 30 mg oral tablet, extended release: 1 tab(s) orally once a day (in the morning) (05 May 2019 21:43)  Metoprolol Succinate ER 50 mg oral tablet, extended release: 1 tab(s) orally once a day (05 May 2019 21:43)  montelukast 10 mg oral tablet: 1 tab(s) orally once a day (05 May 2019 21:43)  Protonix 40 mg oral delayed release tablet: 40 milligram(s) orally 2 times a day (05 May 2019 21:43)  Synthroid 137 mcg (0.137 mg) oral tablet: 1 tab(s) orally 3 times a week (05 May 2019 21:43)  Synthroid 150 mcg (0.15 mg) oral tablet: 1 tab(s) orally 4 times a week (05 May 2019 21:43)  valsartan 160 mg oral capsule: 1 tab(s) orally once a day (05 May 2019 21:43)    Allergies: Allergies    No Known Allergies    Intolerances    Soc:   Advanced Directives: Presumed Full Code     ROS:    REVIEW OF SYSTEMS    [x] A ten-point review of systems was otherwise negative except as noted.  [ ] Due to altered mental status/intubation, subjective information were not able to be obtained from the patient. History was obtained, to the extent possible, from review of the chart and collateral sources of information.      CURRENT MEDICATIONS:   --------------------------------------------------------------------------------------  VITAL SIGNS, INS/OUTS (last 24 hours):  --------------------------------------------------------------------------------------  ICU Vital Signs Last 24 Hrs  T(C): 35.2 (21 May 2019 12:56), Max: 35.2 (21 May 2019 12:56)  T(F): 95.3 (21 May 2019 12:56), Max: 95.3 (21 May 2019 12:56)  HR: 77 (21 May 2019 12:56) (77 - 77)  BP: 109/55 (21 May 2019 12:56) (109/55 - 109/55)  BP(mean): --  ABP: --  ABP(mean): --  RR: 20 (21 May 2019 12:56) (20 - 20)  SpO2: 97% (21 May 2019 12:56) (97% - 97%)    I&O's Summary    --------------------------------------------------------------------------------------  EXAM:  General/Neuro    Exam: Normal, NAD, alert, oriented x 3, no focal deficits. PERRLA      Respiratory  Exam: Lungs clear to auscultation, Normal expansion/effort.    [] Tracheostomy   [] Intubated  Mechanical Ventilation:     Cardiovascular  Exam: S1, S2.  Regular rate and rhythm.   Cardiac Rhythm: Normal Sinus Rhythm  ECHO:     GI  Exam: Abdomen soft, Non-tender, Non-distended.    Current Diet:  NPO    Tubes/Lines/Drains    [x] Peripheral IV  [] Central Venous Line     	[] R	[] L	[] IJ	[] Fem	[] SC        Type:	    Date Placed:   [] Arterial Line		[] R	[] L	[] Fem	[] Rad	[] Ax	Date Placed:   [] PICC:         	[] Midline		[] Mediport           [] Urinary Catheter		Date Placed:     Extremities  Exam: Extremities warm, pink, well-perfused.        Derm:  Exam: Good skin turgor, no skin breakdown.        LABS  --------------------------------------------------------------------------------------  Labs:  CAPILLARY BLOOD GLUCOSE                        12.2   4.23  )-----------( 223      ( 21 May 2019 13:41 )             37.5       Auto Neutrophil %: 52.4 % (05-21-19 @ 13:41)  Auto Immature Granulocyte %: 0.5 % (05-21-19 @ 13:41)      LFTs:     Lactate, Blood: 1.6 mmol/L (05-21-19 @ 13:41)      Coags:    CARDIAC MARKERS ( 21 May 2019 13:41 )  x     / <0.01 ng/mL / x     / x     / x        --------------------------------------------------------------------------------------    OTHER LABS    IMAGING RESULTS      ASSESSMENT:  78y Male with transaminitis    PLAN:   - GI consult  - Trend LFT's  - ERCP  - NPO  - IVF  - Surgery will follow

## 2019-05-21 NOTE — CONSULT NOTE ADULT - ASSESSMENT
79 yo M Hx of CAD, intermediate likelihood for choledocholithiasis admitted for work up.    1)Intermediate likelihood choledocholithiasis- negative recent MRCP  2)CAD on ASA (no DAPT)  3)Abnormal MRI: cyst in the body of the pancreas RO Serous Cystadenoma    Rec:  - Repeat US  - Trend LFTs  - May start diet- NPO after midnight  - No need for repeat for MRCP

## 2019-05-21 NOTE — H&P ADULT - NSHPLABSRESULTS_GEN_ALL_CORE
12.2   4.23  )-----------( 223      ( 21 May 2019 13:41 )             37.5       05-21    137  |  98  |  16  ----------------------------<  104<H>  5.9<H>   |  24  |  1.2    Ca    9.1      21 May 2019 13:41  Mg     1.9     05-21    TPro  7.0  /  Alb  3.7  /  TBili  0.7  /  DBili  0.2  /  AST  112<H>  /  ALT  158<H>  /  AlkPhos  677<H>  05-21      LIVER FUNCTIONS - ( 21 May 2019 14:52 )  Alb: 3.7 g/dL / Pro: 7.0 g/dL / ALK PHOS: 677 U/L / ALT: 158 U/L / AST: 112 U/L / GGT: x             CARDIAC MARKERS ( 21 May 2019 13:41 )  x     / <0.01 ng/mL / 86 U/L / x     / x        < from: MR Abdomen No Cont (05.06.19 @ 19:48) >    1.  No biliary ductal dilatation or evidence of choledocholithiasis.    2.   A 1.7 cm cystic structure within the pancreatic body, possibly a   serous cystadenoma.    < end of copied text >

## 2019-05-21 NOTE — ED PROVIDER NOTE - ATTENDING CONTRIBUTION TO CARE
79 yo M  Lung ca, thyroid ca, recent admission to the hospital r/o ascending cholangitis. followed up with dr hernandez yesterday, labs with elev ALP, ALT and AST. nl Tbili.  no abd pain today. no fever. + vomiting.  sent in for admission for ERCP.  vss, nontoxic, well appearing, pink conj, anicteric, MMM, neck supple, CTAB, RRR, equal radial pulses bilat, abd soft/nt/nd, no cva tend. no calves tend, no edema, no fnd. no rashes.  labs, ivf, admit

## 2019-05-21 NOTE — H&P ADULT - HISTORY OF PRESENT ILLNESS
79 yo M PMH of Lung ca s/p lobectomy (in remission), Thyroid cancer (in remission), hx of COPD, CAD s/p PCI (2005, 2007), HTN, DLD recently admitted and discharged from [facility] from 05/05/05/09 for presented to [facility] with intermittent epigastric pain to r/o ascending cholangitis and was believed to have passed a gallstone and can f/u as OP w/ GI, get an elective cholecystectomy w/ Sx, and d/c w/ PO antibiotics (flagyl and vantin) as per ID. While at Dr. Lemno's office to be evaluated for cholecystectomy, the labwork revealed abnormal results. Patient currently denies any complaints, he says he came for evaluation of abnormal lab results but feels fine.     PMD- Dr. De Los Santos  Card- Dr. Nikko Wiggins Sx- Dr. Lemon  Pharm- [facility] on [address] 79 yo M PMH of Lung ca s/p lobectomy (in remission), Thyroid cancer (in remission), hx of COPD, CAD s/p PCI (2005, 2007), HTN, DLD recently admitted and discharged from [facility] from 05/05/05/09 for presented to [facility] with intermittent epigastric pain to r/o ascending cholangitis and was believed to have passed a gallstone and can f/u as OP w/ GI, get an elective cholecystectomy w/ Sx, and d/c w/ PO antibiotics (flagyl and vantin) as per ID. While at Dr. Lemon's office to be evaluated for cholecystectomy, the labwork revealed abnormal results. Patient currently denies any complaints, he says he came for evaluation of abnormal lab results but feels fine.     Attd: pt has had on/off epigastric/lower sternal area pain for 2 yrs; pain can last 3-4 hrs, sometimes only several minutes to half hr; pain can be daily, sometimes does not recur for a week; pt has had a lot of w/u (including cardiac), but no dx made; he does have abnl LFTs, but no stone found; pain is sometimes manageable at home and sometimes it is severe enough to warrant admission to hospital; so far, nothing has made the pain go away for good; pain does not seem consistently related to eating; seems to occur hrs after eating; pt has no weight loss; he says he is otherwise fine.

## 2019-05-21 NOTE — ED PROVIDER NOTE - PROGRESS NOTE DETAILS
Surgery resident Dr. Reid aware of patient, will evaluate. GI fellow aware of patient - will come evaluate

## 2019-05-21 NOTE — CONSULT NOTE ADULT - ATTENDING COMMENTS
Assessment and plan above were modified and discussed with residents, physician assistants, and nurses.
Agree with above

## 2019-05-21 NOTE — CONSULT NOTE ADULT - SUBJECTIVE AND OBJECTIVE BOX
Gastroenterology/Hepatology Consultation    78y Male with   Patient is a 78y old  Male who presents with a chief complaint of   HPI:      GI Hx:    Previous colonoscopy(ies):  Previous EGD(s):    Family history of prostate cancer (Sibling)  Family history of myocardial infarction (Father)  Family history of heart disease (Mother)      Review of system  General:  (-) weight loss, (-) fevers  Eyes:  (-) visual changes  CV:  (-) chest pain  Resp: (-) SOB, (-) wheezing  GI: (-) abdominal pain,  (-) nausea, (-) vomiting, (-) dysphagia, (-) diarrhea, (-) constipation, (-) rectal bleeding, (-) melena, (-) hematemesis.  Neuro: (-) confusion, (-) weakness  Psych:  (-) Hallucinations  Heme:  (-) easy bruisability    Past medical/surgical Hx:  PAST MEDICAL & SURGICAL HISTORY:  Osteopenia  Hyperuricemia  Lung cancer  Coronary artery disease involving native heart, angina presence unspecified, unspecified vessel or lesion type  ALTAF (obstructive sleep apnea)  Chronic obstructive pulmonary disease, unspecified COPD type  Dyslipidemia  Thyroid cancer  Hypertension, unspecified type  Gastroesophageal reflux disease, esophagitis presence not specified  H/O thyroidectomy  S/P lobectomy of lung    Home Medications:  allopurinol 100 mg oral tablet: 1 tab(s) orally 2 times a day  aspirin 81 mg oral tablet, chewable: 1 tab(s) orally once a day  atorvastatin 10 mg oral tablet: 1 tab(s) orally once a day (at bedtime)  Breo Ellipta 100 mcg-25 mcg/inh inhalation powder: 1 puff(s) inhaled once a day  Calcium 600+D oral tablet: 1 tab(s) orally 2 times a day  isosorbide mononitrate 30 mg oral tablet, extended release: 1 tab(s) orally once a day (in the morning)  Metoprolol Succinate ER 50 mg oral tablet, extended release: 1 tab(s) orally once a day  montelukast 10 mg oral tablet: 1 tab(s) orally once a day  Protonix 40 mg oral delayed release tablet: 40 milligram(s) orally 2 times a day  Synthroid 137 mcg (0.137 mg) oral tablet: 1 tab(s) orally 3 times a week  Synthroid 150 mcg (0.15 mg) oral tablet: 1 tab(s) orally 4 times a week  valsartan 160 mg oral capsule: 1 tab(s) orally once a day      Allergies: No Known Allergies      Current Medications:       Physical exam:  T(C): 35.2 (05-21-19 @ 12:56), Max: 35.2 (05-21-19 @ 12:56)  HR: 77 (05-21-19 @ 12:56) (77 - 77)  BP: 109/55 (05-21-19 @ 12:56) (109/55 - 109/55)  RR: 20 (05-21-19 @ 12:56) (20 - 20)  SpO2: 97% (05-21-19 @ 12:56) (97% - 97%)  GENERAL: NAD  HEAD:  Atraumatic, Normocephalic  EYES: Sclera:NL  NECK: Supple, no JVD or thyromegaly  CHEST/LUNG: Good bilateral air entry  HEART: normal S1, S2. Regular  ABDOMEN: (-) distended, (-) tender, (-) rebound, (+) BS, (-)HSM  EXTREMITIES: (-) edema  NEUROLOGY: (-) asterixis  SKIN: (-) jaundice  PAULETTE: (-) melena (-) brbpr    Data:                        12.2   4.23  )-----------( 223      ( 21 May 2019 13:41 )             37.5     MCV 85.8 (05-21-19)    RDW 15.3 (05-21-19)    HGB trend:  12.2  05-21-19 @ 13:41      05-21    137  |  98  |  16  ----------------------------<  104<H>  5.9<H>   |  24  |  1.2    Ca    9.1      21 May 2019 13:41  Mg     1.9     05-21    TPro  7.0  /  Alb  3.7  /  TBili  0.7  /  DBili  0.2  /  AST  112<H>  /  ALT  158<H>  /  AlkPhos  677<H>  05-21    Liver panel trend:  TBili 0.7   /      /      /   AlkP 677   /   Tptn 7.0   /   Alb 3.7    /   DBili 0.2      05-21    Lipase, Serum: 30 U/L (05-21-19 @ 13:41) Gastroenterology/Hepatology Consultation    78y Male with ?retrosternal pain and abnormal LFTs  Patient is a 78y old  Male who presents with a chief complaint of "sent by surgery for ERCP"    GI Hx:  78 y M hx of CAD stent in '05 on ASA 81, lung Ca, osteopenia, COPD, ALTAF sent in by his surgeon for elevated Alk phos, AST and ALT.  Hx goes back to february when he started complaining of retrosternal pain/?epigastric pain which lasts for hours associated with NBNB vomiting and nausea when his pain subsides.  His work up includes 2 RUQ US 1 MRCP in May on his last admission which failed to show cholelithiaisis/choledocholethiasis nl duct. His LFTs trended down and he was discharged.  He reports an episode of pain on saturday night.  His food intake has decreased and is reorting significant weight loss (>30 lbs) which he states is intentional.      Previous colonoscopy(ies):   Previous EGD(s): Reportedly in April by Michael bhakta    Family history of prostate cancer (Sibling)  Family history of myocardial infarction (Father)  Family history of heart disease (Mother)      Review of system  General:  (-) weight loss, (-) fevers  Eyes:  (-) visual changes  CV:  (-) chest pain  Resp: (-) SOB, (-) wheezing  GI: (-) abdominal pain,  (-) nausea, (-) vomiting, (-) dysphagia, (-) diarrhea, (-) constipation, (-) rectal bleeding, (-) melena, (-) hematemesis.  Neuro: (-) confusion, (-) weakness  Psych:  (-) Hallucinations  Heme:  (-) easy bruisability    Past medical/surgical Hx:  PAST MEDICAL & SURGICAL HISTORY:  Osteopenia  Hyperuricemia  Lung cancer  Coronary artery disease involving native heart, angina presence unspecified, unspecified vessel or lesion type  ALTAF (obstructive sleep apnea)  Chronic obstructive pulmonary disease, unspecified COPD type  Dyslipidemia  Thyroid cancer  Hypertension, unspecified type  Gastroesophageal reflux disease, esophagitis presence not specified  H/O thyroidectomy  S/P lobectomy of lung    Home Medications:  allopurinol 100 mg oral tablet: 1 tab(s) orally 2 times a day  aspirin 81 mg oral tablet, chewable: 1 tab(s) orally once a day  atorvastatin 10 mg oral tablet: 1 tab(s) orally once a day (at bedtime)  Breo Ellipta 100 mcg-25 mcg/inh inhalation powder: 1 puff(s) inhaled once a day  Calcium 600+D oral tablet: 1 tab(s) orally 2 times a day  isosorbide mononitrate 30 mg oral tablet, extended release: 1 tab(s) orally once a day (in the morning)  Metoprolol Succinate ER 50 mg oral tablet, extended release: 1 tab(s) orally once a day  montelukast 10 mg oral tablet: 1 tab(s) orally once a day  Protonix 40 mg oral delayed release tablet: 40 milligram(s) orally 2 times a day  Synthroid 137 mcg (0.137 mg) oral tablet: 1 tab(s) orally 3 times a week  Synthroid 150 mcg (0.15 mg) oral tablet: 1 tab(s) orally 4 times a week  valsartan 160 mg oral capsule: 1 tab(s) orally once a day      Allergies: No Known Allergies      Current Medications:       Physical exam:  T(C): 35.2 (05-21-19 @ 12:56), Max: 35.2 (05-21-19 @ 12:56)  HR: 77 (05-21-19 @ 12:56) (77 - 77)  BP: 109/55 (05-21-19 @ 12:56) (109/55 - 109/55)  RR: 20 (05-21-19 @ 12:56) (20 - 20)  SpO2: 97% (05-21-19 @ 12:56) (97% - 97%)    GENERAL: NAD  HEAD: Atraumatic, Normocephalic  EYES: Sclera: NL  NECK: Supple, no JVD or thyromegaly  CHEST/LUNG: Good bilateral air entry  HEART: normal S1, S2. Regular  ABDOMEN: (-) distended, (-) tender, (-) rebound, (+) BS, (-)HSM  EXTREMITIES: (-) edema  NEUROLOGY: (-) asterixis  SKIN: (-) jaundice    Data:                        12.2   4.23  )-----------( 223      ( 21 May 2019 13:41 )             37.5     MCV 85.8 (05-21-19)    RDW 15.3 (05-21-19)    HGB trend:  12.2  05-21-19 @ 13:41      05-21    137  |  98  |  16  ----------------------------<  104<H>  5.9<H>   |  24  |  1.2    Ca    9.1      21 May 2019 13:41  Mg     1.9     05-21    TPro  7.0  /  Alb  3.7  /  TBili  0.7  /  DBili  0.2  /  AST  112<H>  /  ALT  158<H>  /  AlkPhos  677<H>  05-21    Liver panel trend:  TBili 0.7   /      /      /   AlkP 677   /   Tptn 7.0   /   Alb 3.7    /   DBili 0.2      05-21    Lipase, Serum: 30 U/L (05-21-19 @ 13:41)    < from: MR Abdomen No Cont (05.06.19 @ 19:48) >  COMPARISON MR:  None. Correlation is made with abdominal CT dated May 5,   2019.    FINDINGS:    HEPATOBILIARY: Subcentimeter left hepatic cyst (series 6, image 24). No   biliary ductal dilatation or evidence of choledocholithiasis. The common   bile duct measures 7 mm, which is normal.    SPLEEN:  Unremarkable.    PANCREAS: A 1.7 x 1.5 cm T2 hyperintense structure within the pancreatic   body with suggestion of minimal internal septation and mildly lobulated   border, possibly reflecting a serous cystadenoma. No pancreatic ductal   dilatation.    ADRENAL GLANDS:  Unremarkable.    KIDNEYS:  Unremarkable.    ABDOMINAL NODES:  No adenopathy.    IMPRESSION:    1.  No biliary ductal dilatation or evidence of choledocholithiasis.    2.   A 1.7 cm cystic structure within the pancreatic body, possibly a   serous cystadenoma.    < end of copied text >

## 2019-05-21 NOTE — H&P ADULT - NSHPPHYSICALEXAM_GEN_ALL_CORE
Gen- No acute distress  Head- atraumatic, normocephalic  ENT- normal oropharynx  Cardio- normal S1, S2  Pulm- CTA B/L  Abd- nontender, nondistended  Ext- no cyanosis or edema  Neuro- AOx4  Psych- cooperative, appropriate

## 2019-05-21 NOTE — H&P ADULT - ASSESSMENT
77 yo M PMH of Lung ca s/p lobectomy (in remission), Thyroid cancer (in remission), hx of COPD, CAD s/p PCI (2005, 2007), HTN, DLD present for abnormal labwork at Dr. Lemon's office.    #) Abnormal LFT's   -ALK PHOS: 677 U/L / ALT: 158 U/L / AST: 112 U/L  -RUQ sono ordered  -f/u GI c/s    #) Moderate Hyperkalemia  -K-5.9  -will hold ARB for now and repeat bmp    #) COPD  -c/w home inhalers    #) CAD s/p PCI (2005, 2007)  -c/w home meds (ASA and statin)    #) Thyroid cancer  -in remission  -c/w home med (synthroid)    #) DVT/ GI ppx  -lovenox, no GI ppx indicated    #) Code Status  -full code    #) Dispo  -from home, ambulates independently 79 yo M PMH of Lung ca s/p lobectomy (in remission), Thyroid cancer (in remission), hx of COPD, CAD s/p PCI (2005, 2007), HTN, DLD present for abnormal labwork at Dr. Lemon's office.    #) Abnormal LFT's   -ALK PHOS: 677 U/L / ALT: 158 U/L / AST: 112 U/L  -RUQ sono ordered  -trending LFT's  -f/u GI c/s  -NPO after midnight for possible ERCP in am    #) Moderate Hyperkalemia  -K-5.9  -will hold ARB for now and repeat bmp    #) COPD  -c/w home inhalers    #) CAD s/p PCI (2005, 2007)  -c/w home meds (ASA and statin)    #) Thyroid cancer  -in remission  -c/w home med (synthroid)    #) DVT/ GI ppx  -lovenox, no GI ppx indicated    #) Code Status  -full code    #) Dispo  -from home, ambulates independently 79 yo M PMH of Lung ca s/p lobectomy (in remission), Thyroid cancer (in remission), hx of COPD, CAD s/p PCI (2005, 2007), HTN, DLD present for abnormal labwork at Dr. Lemon's office.    # Abnormal LFT's   ALK PHOS: 677 U/L / ALT: 158 U/L / AST: 112 U/L / GGT also high  RUQ sono: no GB stones; no CBD dilation  no need to trend LFTs daily, can check q48-72 if pt stable  f/u GI c/s  NPO after midnight; IVFs in am deann  pt might need manometric testing of Sphincter of Oddi to r/o SO dysfxn  pt might benefit from ERCP (r/o hidden stone) and sphincterotomy  I would hold off on GB rxn, unless a stone is found; if GB dyxfxn is suspected a nuclear medicine eval of GB fxn can be done (would be up to GI/Sx)  hold allopurinol and statin until LFTs better (once AST and ALT < 100)    # cystic structure in pancrease on MRI is NOT the cause of pain or the abnl LFTs  pt will prob have EUS to further evaluate this  no need for CA 19-9 at this point    # No Hyperkalemia - hemolyzed specimen  can use ARB    # COPD  -c/w home inhalers    # CAD s/p PCI (2005, 2007)  c/w home meds (ASA and statin)    # Thyroid cancer -in remission  c/w home med (synthroid)    # Lung cancer, s/p rxn - in remission    # DVT ppx: LMWH    # GI ppx: no needed    # Code Status -full code    # Dispo:  -from home, ambulates independently

## 2019-05-21 NOTE — ED PROVIDER NOTE - NS ED ROS FT
Constitutional: See HPI.  Eyes: No visual changes, eye pain or discharge.  ENMT: No hearing changes, pain, discharge or infections. No neck pain or stiffness.  Cardiac: No chest pain, SOB or edema. No chest pain with exertion.  Respiratory: No cough or respiratory distress.   GI: No nausea, vomiting, diarrhea or abdominal pain.  : No dysuria, frequency or burning.  MS: No myalgia, muscle weakness, joint pain or back pain.  Neuro: No headache or weakness. No LOC.  Skin: No skin rash.  Endo: No hx of DM, thyroid disease  Except as documented in HPI, all other review of systems is negative

## 2019-05-21 NOTE — ED PROVIDER NOTE - OBJECTIVE STATEMENT
77 y/o male with pmhx of CAD, lung CA, HTN, DLD presents for evaluation of elevated LFTs. Patient was recent discharged from the hospital on 5/9 for ascending cholangitis, advised to follow up with Dr. Lemon and Dr. Barragan. Patient states he followed up with Dr. Lemon yesterday, had blood work done, which resulted back with transaminitis and was advised to come to ER. Patient states 4 days ago, he began to have vomiting and epigastric abdominal pain however subsided yesterday. Patient currently does not have any pain. Denies fevers/chills, sob, chest pain, diarrhea.

## 2019-05-21 NOTE — ED PROVIDER NOTE - PHYSICAL EXAMINATION
CONSTITUTIONAL: Well-developed; well-nourished; in no acute distress.   SKIN: warm, dry; no jaundice  HEAD: Normocephalic; atraumatic.  EYES: no conj injection  ENT: No nasal discharge; airway clear.  NECK: Supple; non tender.  CARD: S1, S2 normal; no murmurs, gallops, or rubs. Regular rate and rhythm.   RESP: No wheezes, rales or rhonchi.  ABD: no clayton's sign; soft ntnd  EXT: Normal ROM.  No clubbing, cyanosis or edema.   NEURO: Alert, oriented, grossly unremarkable  PSYCH: Cooperative, appropriate.

## 2019-05-21 NOTE — H&P ADULT - NSHPSOCIALHISTORY_GEN_ALL_CORE
Former smoker, 4 ppd for 40 years, quit 20 years ago  Denies alcohol use  Denies illicit drug use  Denies any recent travel outside country in past 6 months  Retired, worked in transit on the [employer]  Ambulates independently

## 2019-05-21 NOTE — PATIENT PROFILE ADULT - BRADEN SCORE
Pt received & seen seated upright via stretcher in radiology, +awake/alert however, eyes closed throughout study, +reduced cognition,  +O2 nasal canula with sats: 99%
21

## 2019-05-22 LAB
ALBUMIN SERPL ELPH-MCNC: 3.2 G/DL — LOW (ref 3.5–5.2)
ALP SERPL-CCNC: 523 U/L — HIGH (ref 30–115)
ALT FLD-CCNC: 109 U/L — HIGH (ref 0–41)
ANION GAP SERPL CALC-SCNC: 12 MMOL/L — SIGNIFICANT CHANGE UP (ref 7–14)
APTT BLD: 38 SEC — SIGNIFICANT CHANGE UP (ref 27–39.2)
AST SERPL-CCNC: 50 U/L — HIGH (ref 0–41)
BASOPHILS # BLD AUTO: 0.03 K/UL — SIGNIFICANT CHANGE UP (ref 0–0.2)
BASOPHILS NFR BLD AUTO: 0.9 % — SIGNIFICANT CHANGE UP (ref 0–1)
BILIRUB DIRECT SERPL-MCNC: 0.3 MG/DL — HIGH (ref 0–0.2)
BILIRUB INDIRECT FLD-MCNC: 0.3 MG/DL — SIGNIFICANT CHANGE UP (ref 0.2–1.2)
BILIRUB SERPL-MCNC: 0.6 MG/DL — SIGNIFICANT CHANGE UP (ref 0.2–1.2)
BUN SERPL-MCNC: 15 MG/DL — SIGNIFICANT CHANGE UP (ref 10–20)
CALCIUM SERPL-MCNC: 8.3 MG/DL — LOW (ref 8.5–10.1)
CHLORIDE SERPL-SCNC: 100 MMOL/L — SIGNIFICANT CHANGE UP (ref 98–110)
CO2 SERPL-SCNC: 27 MMOL/L — SIGNIFICANT CHANGE UP (ref 17–32)
CREAT SERPL-MCNC: 0.9 MG/DL — SIGNIFICANT CHANGE UP (ref 0.7–1.5)
EOSINOPHIL # BLD AUTO: 0.16 K/UL — SIGNIFICANT CHANGE UP (ref 0–0.7)
EOSINOPHIL NFR BLD AUTO: 5 % — SIGNIFICANT CHANGE UP (ref 0–8)
GGT SERPL-CCNC: 438 U/L — HIGH (ref 1–40)
GLUCOSE SERPL-MCNC: 84 MG/DL — SIGNIFICANT CHANGE UP (ref 70–99)
HCT VFR BLD CALC: 35.1 % — LOW (ref 42–52)
HGB BLD-MCNC: 11.5 G/DL — LOW (ref 14–18)
IMM GRANULOCYTES NFR BLD AUTO: 0.9 % — HIGH (ref 0.1–0.3)
INR BLD: 1.12 RATIO — SIGNIFICANT CHANGE UP (ref 0.65–1.3)
LACTATE SERPL-SCNC: 0.7 MMOL/L — SIGNIFICANT CHANGE UP (ref 0.5–2.2)
LYMPHOCYTES # BLD AUTO: 0.92 K/UL — LOW (ref 1.2–3.4)
LYMPHOCYTES # BLD AUTO: 28.8 % — SIGNIFICANT CHANGE UP (ref 20.5–51.1)
MAGNESIUM SERPL-MCNC: 1.7 MG/DL — LOW (ref 1.8–2.4)
MCHC RBC-ENTMCNC: 28.1 PG — SIGNIFICANT CHANGE UP (ref 27–31)
MCHC RBC-ENTMCNC: 32.8 G/DL — SIGNIFICANT CHANGE UP (ref 32–37)
MCV RBC AUTO: 85.8 FL — SIGNIFICANT CHANGE UP (ref 80–94)
MONOCYTES # BLD AUTO: 0.64 K/UL — HIGH (ref 0.1–0.6)
MONOCYTES NFR BLD AUTO: 20.1 % — HIGH (ref 1.7–9.3)
NEUTROPHILS # BLD AUTO: 1.41 K/UL — SIGNIFICANT CHANGE UP (ref 1.4–6.5)
NEUTROPHILS NFR BLD AUTO: 44.3 % — SIGNIFICANT CHANGE UP (ref 42.2–75.2)
NRBC # BLD: 0 /100 WBCS — SIGNIFICANT CHANGE UP (ref 0–0)
PLATELET # BLD AUTO: 157 K/UL — SIGNIFICANT CHANGE UP (ref 130–400)
POTASSIUM SERPL-MCNC: 3.6 MMOL/L — SIGNIFICANT CHANGE UP (ref 3.5–5)
POTASSIUM SERPL-SCNC: 3.6 MMOL/L — SIGNIFICANT CHANGE UP (ref 3.5–5)
PROT SERPL-MCNC: 5.8 G/DL — LOW (ref 6–8)
PROTHROM AB SERPL-ACNC: 12.9 SEC — HIGH (ref 9.95–12.87)
RBC # BLD: 4.09 M/UL — LOW (ref 4.7–6.1)
RBC # FLD: 14.7 % — HIGH (ref 11.5–14.5)
SODIUM SERPL-SCNC: 139 MMOL/L — SIGNIFICANT CHANGE UP (ref 135–146)
WBC # BLD: 3.19 K/UL — LOW (ref 4.8–10.8)
WBC # FLD AUTO: 3.19 K/UL — LOW (ref 4.8–10.8)

## 2019-05-22 PROCEDURE — 99231 SBSQ HOSP IP/OBS SF/LOW 25: CPT

## 2019-05-22 RX ORDER — VALSARTAN 80 MG/1
160 TABLET ORAL DAILY
Refills: 0 | Status: DISCONTINUED | OUTPATIENT
Start: 2019-05-22 | End: 2019-05-25

## 2019-05-22 RX ADMIN — MONTELUKAST 10 MILLIGRAM(S): 4 TABLET, CHEWABLE ORAL at 12:08

## 2019-05-22 RX ADMIN — VALSARTAN 160 MILLIGRAM(S): 80 TABLET ORAL at 17:20

## 2019-05-22 RX ADMIN — ISOSORBIDE MONONITRATE 30 MILLIGRAM(S): 60 TABLET, EXTENDED RELEASE ORAL at 12:08

## 2019-05-22 RX ADMIN — Medication 100 MILLIGRAM(S): at 06:01

## 2019-05-22 RX ADMIN — ENOXAPARIN SODIUM 40 MILLIGRAM(S): 100 INJECTION SUBCUTANEOUS at 21:25

## 2019-05-22 RX ADMIN — Medication 1 TABLET(S): at 12:08

## 2019-05-22 RX ADMIN — PANTOPRAZOLE SODIUM 40 MILLIGRAM(S): 20 TABLET, DELAYED RELEASE ORAL at 06:01

## 2019-05-22 RX ADMIN — PANTOPRAZOLE SODIUM 40 MILLIGRAM(S): 20 TABLET, DELAYED RELEASE ORAL at 17:20

## 2019-05-22 RX ADMIN — BUDESONIDE AND FORMOTEROL FUMARATE DIHYDRATE 2 PUFF(S): 160; 4.5 AEROSOL RESPIRATORY (INHALATION) at 21:25

## 2019-05-22 RX ADMIN — BUDESONIDE AND FORMOTEROL FUMARATE DIHYDRATE 2 PUFF(S): 160; 4.5 AEROSOL RESPIRATORY (INHALATION) at 08:07

## 2019-05-22 RX ADMIN — Medication 137 MICROGRAM(S): at 06:01

## 2019-05-22 RX ADMIN — Medication 50 MILLIGRAM(S): at 06:01

## 2019-05-22 RX ADMIN — Medication 81 MILLIGRAM(S): at 12:08

## 2019-05-22 NOTE — PROGRESS NOTE ADULT - SUBJECTIVE AND OBJECTIVE BOX
Hepatology/GI follow up note: Pt seen and examined at bedside.   78y Male seen for: intermediate likelihood for choledocholithiasis    Subjective/Interval events:  No Pain    Review of system  General:  (-) weight loss, (-) fevers  Eyes:  (-) visual changes  CV:  (-) chest pain  Resp: (-) SOB, (-) wheezing  GI: (-) abdominal pain,  (-) nausea, (-) vomiting, (-) dysphagia, (-) diarrhea, (-) constipation, (-) rectal bleeding, (-) melena, (-) hematemesis.  Neuro: (-) confusion, (-) weakness  Psych:  (-) Hallucinations  Heme:  (-) easy bruisability    Past medical/surgical Hx:  PAST MEDICAL & SURGICAL HISTORY:  Osteopenia  Hyperuricemia  Lung cancer  Coronary artery disease involving native heart, angina presence unspecified, unspecified vessel or lesion type  ALTAF (obstructive sleep apnea)  Chronic obstructive pulmonary disease, unspecified COPD type  Dyslipidemia  Thyroid cancer  Hypertension, unspecified type  Gastroesophageal reflux disease, esophagitis presence not specified  H/O thyroidectomy  S/P lobectomy of lung    Home Medications:  Last Order Reconciliation Date: 05-21-19 @ 16:46 (Admission Reconciliation)  allopurinol 100 mg oral tablet: 1 tab(s) orally 2 times a day  aspirin 81 mg oral tablet, chewable: 1 tab(s) orally once a day  atorvastatin 10 mg oral tablet: 1 tab(s) orally once a day (at bedtime)  Breo Ellipta 100 mcg-25 mcg/inh inhalation powder: 1 puff(s) inhaled once a day  Calcium 600+D oral tablet: 1 tab(s) orally 2 times a day  cefpodoxime 200 mg oral tablet: 1 tab(s) orally every 12 hours   isosorbide mononitrate 30 mg oral tablet, extended release: 1 tab(s) orally once a day (in the morning)  Metoprolol Succinate ER 50 mg oral tablet, extended release: 1 tab(s) orally once a day  metroNIDAZOLE 500 mg oral tablet: 1 tab(s) orally every 12 hours   montelukast 10 mg oral tablet: 1 tab(s) orally once a day  Protonix 40 mg oral delayed release tablet: 40 milligram(s) orally 2 times a day  Synthroid 137 mcg (0.137 mg) oral tablet: 1 tab(s) orally 3 times a week  Synthroid 150 mcg (0.15 mg) oral tablet: 1 tab(s) orally 4 times a week  valsartan 160 mg oral capsule: 1 tab(s) orally once a day      Allergies:  No Known Allergies      Current Medications:   allopurinol 100 milliGRAM(s) Oral two times a day  aspirin  chewable 81 milliGRAM(s) Oral daily  atorvastatin 10 milliGRAM(s) Oral at bedtime  buDESOnide 160 MICROgram(s)/formoterol 4.5 MICROgram(s) Inhaler 2 Puff(s) Inhalation two times a day  calcium carbonate 1250 mG  + Vitamin D (OsCal 500 + D) 1 Tablet(s) Oral daily  chlorhexidine 4% Liquid 1 Application(s) Topical <User Schedule>  enoxaparin Injectable 40 milliGRAM(s) SubCutaneous at bedtime  isosorbide   mononitrate ER Tablet (IMDUR) 30 milliGRAM(s) Oral daily  levothyroxine 137 MICROGram(s) Oral daily  metoprolol succinate ER 50 milliGRAM(s) Oral daily  montelukast 10 milliGRAM(s) Oral daily  pantoprazole    Tablet 40 milliGRAM(s) Oral every 12 hours        Physical exam:  T(C): 35.7 (05-21-19 @ 22:54), Max: 35.8 (05-21-19 @ 21:32)  HR: 68 (05-22-19 @ 05:07) (67 - 77)  BP: 155/70 (05-22-19 @ 05:07) (109/55 - 155/70)  RR: 18 (05-22-19 @ 05:07) (18 - 20)  SpO2: 96% (05-21-19 @ 21:32) (96% - 97%)    GENERAL: NAD  HEAD:  Atraumatic, Normocephalic  EYES: Sclera:NL  NECK: Supple, no JVD or thyromegaly  CHEST/LUNG: Good bilateral air entry  HEART: normal S1, S2. Regular  ABDOMEN: (-) distended, (-) tender, (-) rebound, (+) BS, (-)HSM  EXTREMITIES: (-) edema  NEUROLOGY: (-) asterixis  SKIN: (-) jaundice      Data:                        11.5   3.19  )-----------( 157      ( 22 May 2019 05:16 )             35.1     MCV 85.8 (05-22-19)  85.8 (05-21-19)    RDW 14.7 (05-22-19)  15.3 (05-21-19)    HGB trend:  11.5  05-22-19 @ 05:16  12.2  05-21-19 @ 13:41        WBC trend:  3.19  05-22-19 @ 05:16  4.23  05-21-19 @ 13:41    05-22    139  |  100  |  15  ----------------------------<  84  3.6   |  27  |  0.9    Ca    8.3<L>      22 May 2019 05:16  Mg     1.7     05-22    TPro  5.8<L>  /  Alb  3.2<L>  /  TBili  0.6  /  DBili  0.3<H>  /  AST  50<H>  /  ALT  109<H>  /  AlkPhos  523<H>  05-22    Liver panel trend:  TBili 0.6   /   AST 50   /      /   AlkP 523   /   Tptn 5.8   /   Alb 3.2    /   DBili 0.3      05-22  TBili 0.7   /   AST 70   /      /   AlkP 597   /   Tptn 6.1   /   Alb 3.6    /   DBili 0.4      05-21  TBili 0.7   /      /      /   AlkP 677   /   Tptn 7.0   /   Alb 3.7    /   DBili 0.2      05-21    PT/INR - ( 22 May 2019 05:16 )   PT: 12.90 sec;   INR: 1.12 ratio    PTT - ( 22 May 2019 05:16 )  PTT:38.0 sec

## 2019-05-22 NOTE — PROGRESS NOTE ADULT - SUBJECTIVE AND OBJECTIVE BOX
Hospital Day:  1d    Subjective:    Patient is a 78y old  Male who presents with a chief complaint of Sent in for ERCP (22 May 2019 07:49)    No overnight events. Seen and examined at bedside. Reported no acute complaints. Stated he is feeling fine today and just wants to figure out what is wrong with him.     Past Medical Hx:   Osteopenia  Hyperuricemia  Lung cancer  Coronary artery disease involving native heart, angina presence unspecified, unspecified vessel or lesion type  ALTAF (obstructive sleep apnea)  Chronic obstructive pulmonary disease, unspecified COPD type  Dyslipidemia  Thyroid cancer  Hypertension, unspecified type  Gastroesophageal reflux disease, esophagitis presence not specified  Malignant neoplasm of lung, unspecified laterality, unspecified part of lung    Past Sx:  H/O thyroidectomy  S/P lobectomy of lung    Allergies:  No Known Allergies    Current Meds:   Standng Meds:  aspirin  chewable 81 milliGRAM(s) Oral daily  buDESOnide 160 MICROgram(s)/formoterol 4.5 MICROgram(s) Inhaler 2 Puff(s) Inhalation two times a day  calcium carbonate 1250 mG  + Vitamin D (OsCal 500 + D) 1 Tablet(s) Oral daily  chlorhexidine 4% Liquid 1 Application(s) Topical <User Schedule>  enoxaparin Injectable 40 milliGRAM(s) SubCutaneous at bedtime  isosorbide   mononitrate ER Tablet (IMDUR) 30 milliGRAM(s) Oral daily  levothyroxine 137 MICROGram(s) Oral daily  metoprolol succinate ER 50 milliGRAM(s) Oral daily  montelukast 10 milliGRAM(s) Oral daily  pantoprazole    Tablet 40 milliGRAM(s) Oral every 12 hours    PRN Meds:    HOME MEDICATIONS:  allopurinol 100 mg oral tablet: 1 tab(s) orally 2 times a day  aspirin 81 mg oral tablet, chewable: 1 tab(s) orally once a day  atorvastatin 10 mg oral tablet: 1 tab(s) orally once a day (at bedtime)  Breo Ellipta 100 mcg-25 mcg/inh inhalation powder: 1 puff(s) inhaled once a day  Calcium 600+D oral tablet: 1 tab(s) orally 2 times a day  isosorbide mononitrate 30 mg oral tablet, extended release: 1 tab(s) orally once a day (in the morning)  Metoprolol Succinate ER 50 mg oral tablet, extended release: 1 tab(s) orally once a day  montelukast 10 mg oral tablet: 1 tab(s) orally once a day  Protonix 40 mg oral delayed release tablet: 40 milligram(s) orally 2 times a day  Synthroid 137 mcg (0.137 mg) oral tablet: 1 tab(s) orally 3 times a week  Synthroid 150 mcg (0.15 mg) oral tablet: 1 tab(s) orally 4 times a week  valsartan 160 mg oral capsule: 1 tab(s) orally once a day      Vital Signs:   T(F): 96.2 (05-21-19 @ 22:54), Max: 96.5 (05-21-19 @ 21:32)  HR: 68 (05-22-19 @ 05:07) (67 - 77)  BP: 155/70 (05-22-19 @ 05:07) (109/55 - 155/70)  RR: 18 (05-22-19 @ 05:07) (18 - 20)  SpO2: 96% (05-21-19 @ 21:32) (96% - 97%)      05-21-19 @ 07:01  -  05-22-19 @ 07:00  --------------------------------------------------------  IN: 0 mL / OUT: 0 mL / NET: 0 mL        Physical Exam:   GENERAL: NAD, Lying in bed, pleasant and conversive appearing stated age  HEENT: NCAT, PERRLA, EOMI. Neck noted for indentation on anterior lower neck from prior surgery  CHEST/LUNG: CTA, No wheezing, rhonchi, rales  HEART: Regular rate and rhythm; s1 s2 appreciated, No murmurs, rubs, or gallops  ABDOMEN: Soft, Mild tenderness to deep palpation on RUQ, Nondistended; Bowel sounds present, No rebound tenderness, Mims negative, no hepatomegaly  EXTREMITIES: No LE edema b/l, Peripheral pulses 2+, No cyanosis, No clubbing  NERVOUS SYSTEM:  Alert & Oriented X3, non focal    Labs:                         11.5   3.19  )-----------( 157      ( 22 May 2019 05:16 )             35.1     Neutophil% 44.3, Lymphocyte% 28.8, Monocyte% 20.1, Bands% 0.9 05-22-19 @ 05:16    22 May 2019 05:16    139    |  100    |  15     ----------------------------<  84     3.6     |  27     |  0.9      Ca    8.3        22 May 2019 05:16  Mg     1.7       22 May 2019 05:16    TPro  5.8    /  Alb  3.2    /  TBili  0.6    /  DBili  0.3    /  AST  50     /  ALT  109    /  AlkPhos  523    22 May 2019 05:16       pTT    38.0             ----< 1.12 INR  (05-22-19 @ 05:16)    12.90        PT    Amylase --, Lipase 30, 05-21-19 @ 13:41    Troponin <0.01, CKMB --, CK 86 05-21-19 @ 13:41    Radiology:   US Abdomen Limited (05.21.19 @ 17:47)  INTERPRETATION:  CLINICAL HISTORY: Elevated LFTs.    COMPARISON: 5/5/2019.    PROCEDURE: Ultrasound of the right upper quadrant was performed.    FINDINGS:    LIVER:  Normal in contour and echogenicity measuring 17.2 cm in length.   No focal mass.    GALLBLADDER/BILIARY TREE:  Contracted gallbladder. No cholelithiasis or   pericholecystic fluid. Negative sonographic Mims's sign. No   intrahepatic biliary ductal dilatation. The common bile duct measures 6   mm, which is normal.     PANCREAS: Obscured by overlying bowel gas and cannot be assessed.    KIDNEY:  Right kidney measures 10.9 cm in length. No hydronephrosis,   calculi or solid mass.    AORTA/IVC:  Visualized proximal portions unremarkable.    ASCITES:  None.    IMPRESSION:    1.  Unremarkable liver without evidence for biliary ductal dilation.    2.  No cholelithiasis or sonographic evidence for cholecystitis.    Assessment and Plan:   This is a 78 year old male with a PMHx of Lung cancer s/p lobectomy (noted in remission), thyroid cancer (noted in remission), history of COPD, CAD s/p PCI (2005, 2007), Hypertension, Dyslipidemia, with recent admission and discharge for epigastric pain who presented with elevated labs    #Transaminitis likely secondary to choledocholithiasis   - Noted Alk Phos of 677, ,  on admission. This morning AST 50, , Alk Phos 523  - RUQ sono negative   - [facility] team following; plan for EUS with ERCP and possible sphincterotomy  - Low fat diet for now, NPO after midnight    #Pancreatic cyst, rule out serous cystadenoma  - To be addressed with EUS and ERCP     #COPD   - Continue with home inhalers (symbicort) and montelukast    #CAD s/p PCI in 2005 and 2007  - Continue on Aspirin, Imdur and Metoprolol     #Thyroid Cancer in remission  - Continue with Synthroid 137mcg    #Suspected Vitamin D deficiency  - Continue on calcium carbonate and Vitamin D      Activity: As tolerated  Diet: Low Fat; NPO at midnight  DVT ppx: Lovenox  GI ppx: Protonix  Code Status: full code  DISPO: From home, pending GI procedure Hospital Day:  1d    Subjective:    Patient is a 78y old  Male who presents with a chief complaint of Sent in for ERCP (22 May 2019 07:49)    No overnight events. Seen and examined at bedside. Reported no acute complaints. Stated he is feeling fine today and just wants to figure out what is wrong with him.     Past Medical Hx:   Osteopenia  Hyperuricemia  Lung cancer  Coronary artery disease involving native heart, angina presence unspecified, unspecified vessel or lesion type  ALTAF (obstructive sleep apnea)  Chronic obstructive pulmonary disease, unspecified COPD type  Dyslipidemia  Thyroid cancer  Hypertension, unspecified type  Gastroesophageal reflux disease, esophagitis presence not specified  Malignant neoplasm of lung, unspecified laterality, unspecified part of lung    Past Sx:  H/O thyroidectomy  S/P lobectomy of lung    Allergies:  No Known Allergies    Current Meds:   Standng Meds:  aspirin  chewable 81 milliGRAM(s) Oral daily  buDESOnide 160 MICROgram(s)/formoterol 4.5 MICROgram(s) Inhaler 2 Puff(s) Inhalation two times a day  calcium carbonate 1250 mG  + Vitamin D (OsCal 500 + D) 1 Tablet(s) Oral daily  chlorhexidine 4% Liquid 1 Application(s) Topical <User Schedule>  enoxaparin Injectable 40 milliGRAM(s) SubCutaneous at bedtime  isosorbide   mononitrate ER Tablet (IMDUR) 30 milliGRAM(s) Oral daily  levothyroxine 137 MICROGram(s) Oral daily  metoprolol succinate ER 50 milliGRAM(s) Oral daily  montelukast 10 milliGRAM(s) Oral daily  pantoprazole    Tablet 40 milliGRAM(s) Oral every 12 hours    PRN Meds:    HOME MEDICATIONS:  allopurinol 100 mg oral tablet: 1 tab(s) orally 2 times a day  aspirin 81 mg oral tablet, chewable: 1 tab(s) orally once a day  atorvastatin 10 mg oral tablet: 1 tab(s) orally once a day (at bedtime)  Breo Ellipta 100 mcg-25 mcg/inh inhalation powder: 1 puff(s) inhaled once a day  Calcium 600+D oral tablet: 1 tab(s) orally 2 times a day  isosorbide mononitrate 30 mg oral tablet, extended release: 1 tab(s) orally once a day (in the morning)  Metoprolol Succinate ER 50 mg oral tablet, extended release: 1 tab(s) orally once a day  montelukast 10 mg oral tablet: 1 tab(s) orally once a day  Protonix 40 mg oral delayed release tablet: 40 milligram(s) orally 2 times a day  Synthroid 137 mcg (0.137 mg) oral tablet: 1 tab(s) orally 3 times a week  Synthroid 150 mcg (0.15 mg) oral tablet: 1 tab(s) orally 4 times a week  valsartan 160 mg oral capsule: 1 tab(s) orally once a day      Vital Signs:   T(F): 96.2 (05-21-19 @ 22:54), Max: 96.5 (05-21-19 @ 21:32)  HR: 68 (05-22-19 @ 05:07) (67 - 77)  BP: 155/70 (05-22-19 @ 05:07) (109/55 - 155/70)  RR: 18 (05-22-19 @ 05:07) (18 - 20)  SpO2: 96% (05-21-19 @ 21:32) (96% - 97%)      05-21-19 @ 07:01  -  05-22-19 @ 07:00  --------------------------------------------------------  IN: 0 mL / OUT: 0 mL / NET: 0 mL        Physical Exam:   GENERAL: NAD, Lying in bed, pleasant and conversive appearing stated age  HEENT: NCAT, PERRLA, EOMI. Neck noted for indentation on anterior lower neck from prior surgery  CHEST/LUNG: CTA, No wheezing, rhonchi, rales  HEART: Regular rate and rhythm; s1 s2 appreciated, No murmurs, rubs, or gallops  ABDOMEN: Soft, Mild tenderness to deep palpation on RUQ, Nondistended; Bowel sounds present, No rebound tenderness, Mims negative, no hepatomegaly  EXTREMITIES: No LE edema b/l, Peripheral pulses 2+, No cyanosis, No clubbing  NERVOUS SYSTEM:  Alert & Oriented X3, non focal    Labs:                         11.5   3.19  )-----------( 157      ( 22 May 2019 05:16 )             35.1     Neutophil% 44.3, Lymphocyte% 28.8, Monocyte% 20.1, Bands% 0.9 05-22-19 @ 05:16    22 May 2019 05:16    139    |  100    |  15     ----------------------------<  84     3.6     |  27     |  0.9      Ca    8.3        22 May 2019 05:16  Mg     1.7       22 May 2019 05:16    TPro  5.8    /  Alb  3.2    /  TBili  0.6    /  DBili  0.3    /  AST  50     /  ALT  109    /  AlkPhos  523    22 May 2019 05:16       pTT    38.0             ----< 1.12 INR  (05-22-19 @ 05:16)    12.90        PT    Amylase --, Lipase 30, 05-21-19 @ 13:41    Troponin <0.01, CKMB --, CK 86 05-21-19 @ 13:41    Radiology:   US Abdomen Limited (05.21.19 @ 17:47)  INTERPRETATION:  CLINICAL HISTORY: Elevated LFTs.    COMPARISON: 5/5/2019.    PROCEDURE: Ultrasound of the right upper quadrant was performed.    FINDINGS:    LIVER:  Normal in contour and echogenicity measuring 17.2 cm in length.   No focal mass.    GALLBLADDER/BILIARY TREE:  Contracted gallbladder. No cholelithiasis or   pericholecystic fluid. Negative sonographic Mims's sign. No   intrahepatic biliary ductal dilatation. The common bile duct measures 6   mm, which is normal.     PANCREAS: Obscured by overlying bowel gas and cannot be assessed.    KIDNEY:  Right kidney measures 10.9 cm in length. No hydronephrosis,   calculi or solid mass.    AORTA/IVC:  Visualized proximal portions unremarkable.    ASCITES:  None.    IMPRESSION:    1.  Unremarkable liver without evidence for biliary ductal dilation.    2.  No cholelithiasis or sonographic evidence for cholecystitis.    Assessment and Plan:   This is a 78 year old male with a PMHx of Lung cancer s/p lobectomy (noted in remission), thyroid cancer (noted in remission), history of COPD, CAD s/p PCI (2005, 2007), Hypertension, Dyslipidemia, with recent admission and discharge for epigastric pain who presented with elevated labs    #Transaminitis likely secondary to choledocholithiasis   - Noted Alk Phos of 677, ,  on admission. This morning AST 50, , Alk Phos 523  - RUQ sono negative   - [facility] team following; plan for EUS with ERCP and possible sphincterotomy  - Low fat diet for now, NPO after midnight  - Holding Allopurinol and Atorvastatin for now    #Pancreatic cyst, rule out serous cystadenoma  - To be addressed with EUS and ERCP     #COPD   - Continue with home inhalers (symbicort) and montelukast    #CAD s/p PCI in 2005 and 2007  - Continue on Aspirin, Imdur and Metoprolol     #Thyroid Cancer in remission  - Continue with Synthroid 137mcg    #Suspected Vitamin D deficiency  - Continue on calcium carbonate and Vitamin D      Activity: As tolerated  Diet: Low Fat; NPO at midnight  DVT ppx: Lovenox  GI ppx: Protonix  Code Status: full code  DISPO: From home, pending GI procedure Hospital Day:  1d    Subjective:    Patient is a 78y old  Male who presents with a chief complaint of Sent in for ERCP (22 May 2019 07:49)    No overnight events. Seen and examined at bedside. Reported no acute complaints. Stated he is feeling fine today and just wants to figure out what is wrong with him.     Past Medical Hx:   Osteopenia  Hyperuricemia  Lung cancer  Coronary artery disease involving native heart, angina presence unspecified, unspecified vessel or lesion type  ALTAF (obstructive sleep apnea)  Chronic obstructive pulmonary disease, unspecified COPD type  Dyslipidemia  Thyroid cancer  Hypertension, unspecified type  Gastroesophageal reflux disease, esophagitis presence not specified  Malignant neoplasm of lung, unspecified laterality, unspecified part of lung    Past Sx:  H/O thyroidectomy  S/P lobectomy of lung    Allergies:  No Known Allergies    Current Meds:   Standng Meds:  aspirin  chewable 81 milliGRAM(s) Oral daily  buDESOnide 160 MICROgram(s)/formoterol 4.5 MICROgram(s) Inhaler 2 Puff(s) Inhalation two times a day  calcium carbonate 1250 mG  + Vitamin D (OsCal 500 + D) 1 Tablet(s) Oral daily  chlorhexidine 4% Liquid 1 Application(s) Topical <User Schedule>  enoxaparin Injectable 40 milliGRAM(s) SubCutaneous at bedtime  isosorbide   mononitrate ER Tablet (IMDUR) 30 milliGRAM(s) Oral daily  levothyroxine 137 MICROGram(s) Oral daily  metoprolol succinate ER 50 milliGRAM(s) Oral daily  montelukast 10 milliGRAM(s) Oral daily  pantoprazole    Tablet 40 milliGRAM(s) Oral every 12 hours    PRN Meds:    HOME MEDICATIONS:  allopurinol 100 mg oral tablet: 1 tab(s) orally 2 times a day  aspirin 81 mg oral tablet, chewable: 1 tab(s) orally once a day  atorvastatin 10 mg oral tablet: 1 tab(s) orally once a day (at bedtime)  Breo Ellipta 100 mcg-25 mcg/inh inhalation powder: 1 puff(s) inhaled once a day  Calcium 600+D oral tablet: 1 tab(s) orally 2 times a day  isosorbide mononitrate 30 mg oral tablet, extended release: 1 tab(s) orally once a day (in the morning)  Metoprolol Succinate ER 50 mg oral tablet, extended release: 1 tab(s) orally once a day  montelukast 10 mg oral tablet: 1 tab(s) orally once a day  Protonix 40 mg oral delayed release tablet: 40 milligram(s) orally 2 times a day  Synthroid 137 mcg (0.137 mg) oral tablet: 1 tab(s) orally 3 times a week  Synthroid 150 mcg (0.15 mg) oral tablet: 1 tab(s) orally 4 times a week  valsartan 160 mg oral capsule: 1 tab(s) orally once a day      Vital Signs:   T(F): 96.2 (05-21-19 @ 22:54), Max: 96.5 (05-21-19 @ 21:32)  HR: 68 (05-22-19 @ 05:07) (67 - 77)  BP: 155/70 (05-22-19 @ 05:07) (109/55 - 155/70)  RR: 18 (05-22-19 @ 05:07) (18 - 20)  SpO2: 96% (05-21-19 @ 21:32) (96% - 97%)      05-21-19 @ 07:01  -  05-22-19 @ 07:00  --------------------------------------------------------  IN: 0 mL / OUT: 0 mL / NET: 0 mL        Physical Exam:   GENERAL: NAD, Lying in bed, pleasant and conversive appearing stated age  HEENT: NCAT, PERRLA, EOMI. Neck noted for indentation on anterior lower neck from prior surgery  CHEST/LUNG: CTA, No wheezing, rhonchi, rales  HEART: Regular rate and rhythm; s1 s2 appreciated, No murmurs, rubs, or gallops  ABDOMEN: Soft, Mild tenderness to deep palpation on RUQ, Nondistended; Bowel sounds present, No rebound tenderness, Mims negative, no hepatomegaly  EXTREMITIES: No LE edema b/l, Peripheral pulses 2+, No cyanosis, No clubbing  NERVOUS SYSTEM:  Alert & Oriented X3, non focal    Labs:                         11.5   3.19  )-----------( 157      ( 22 May 2019 05:16 )             35.1     Neutophil% 44.3, Lymphocyte% 28.8, Monocyte% 20.1, Bands% 0.9 05-22-19 @ 05:16    22 May 2019 05:16    139    |  100    |  15     ----------------------------<  84     3.6     |  27     |  0.9      Ca    8.3        22 May 2019 05:16  Mg     1.7       22 May 2019 05:16    TPro  5.8    /  Alb  3.2    /  TBili  0.6    /  DBili  0.3    /  AST  50     /  ALT  109    /  AlkPhos  523    22 May 2019 05:16       pTT    38.0             ----< 1.12 INR  (05-22-19 @ 05:16)    12.90        PT    Amylase --, Lipase 30, 05-21-19 @ 13:41    Troponin <0.01, CKMB --, CK 86 05-21-19 @ 13:41    Radiology:   US Abdomen Limited (05.21.19 @ 17:47)  INTERPRETATION:  CLINICAL HISTORY: Elevated LFTs.    COMPARISON: 5/5/2019.    PROCEDURE: Ultrasound of the right upper quadrant was performed.    FINDINGS:    LIVER:  Normal in contour and echogenicity measuring 17.2 cm in length.   No focal mass.    GALLBLADDER/BILIARY TREE:  Contracted gallbladder. No cholelithiasis or   pericholecystic fluid. Negative sonographic Mims's sign. No   intrahepatic biliary ductal dilatation. The common bile duct measures 6   mm, which is normal.     PANCREAS: Obscured by overlying bowel gas and cannot be assessed.    KIDNEY:  Right kidney measures 10.9 cm in length. No hydronephrosis,   calculi or solid mass.    AORTA/IVC:  Visualized proximal portions unremarkable.    ASCITES:  None.    IMPRESSION:    1.  Unremarkable liver without evidence for biliary ductal dilation.    2.  No cholelithiasis or sonographic evidence for cholecystitis.    Assessment and Plan:   This is a 78 year old male with a PMHx of Lung cancer s/p lobectomy (noted in remission), thyroid cancer (noted in remission), history of COPD, CAD s/p PCI (2005, 2007), Hypertension, Dyslipidemia, with recent admission and discharge for epigastric pain who presented with elevated labs    #Transaminitis likely secondary to choledocholithiasis   - Noted Alk Phos of 677, ,  on admission. This morning AST 50, , Alk Phos 523  - RUQ sono negative   - [facility] GI team following; plan for EUS with ERCP and possible sphincterotomy  - Low fat diet for now, NPO after midnight  - Holding Allopurinol and Atorvastatin for now    #Pancreatic cyst, rule out serous cystadenoma  - To be addressed with EUS and ERCP     #COPD   - Continue with home inhalers (symbicort) and montelukast    #CAD s/p PCI in 2005 and 2007  - Continue on Aspirin, Imdur and Metoprolol     #Thyroid Cancer in remission  - Continue with Synthroid 137mcg    #Suspected Vitamin D deficiency  - Continue on calcium carbonate and Vitamin D    #Hyperkalemia (hemolyzed)  - Resume on Diovan 160; contacting pharmacy to get it ordered.     Activity: As tolerated  Diet: Low Fat; NPO at midnight  DVT ppx: Lovenox  GI ppx: Protonix  Code Status: full code  DISPO: From home, pending GI procedure

## 2019-05-22 NOTE — PROGRESS NOTE ADULT - SUBJECTIVE AND OBJECTIVE BOX
Progress Note: General Surgery  Patient: HERIBERTO RODRIGUEZ , 78y (1940)Male   MRN: [id number]  Location: [facility] [id number]  Visit: 05-21-19 Inpatient  Date: 05-22-19 @ 04:44    Procedure/Diagnosis: CBD obstruction    Events/ 24h: No acute events overnight. Pain controlled.    Vitals: T(F): 96.2 (05-21-19 @ 22:54), Max: 96.5 (05-21-19 @ 21:32)  HR: 67 (05-21-19 @ 22:54)  BP: 137/61 (05-21-19 @ 22:54) (109/55 - 137/61)  RR: 18 (05-21-19 @ 22:54)  SpO2: 96% (05-21-19 @ 21:32)      Diet: Diet, Clear Liquid (05-21-19 @ 17:05)  Diet, NPO after Midnight:      NPO Start Date: 21-May-2019,   NPO Start Time: 23:59  Except Medications (05-21-19 @ 17:06)    IV Fluids: calcium carbonate 1250 mG  + Vitamin D (OsCal 500 + D) 1 Tablet(s) Oral daily      Physical Examination:  General Appearance: NAD  HEENT: EOMI, sclera non-icteric.  Heart: RRR   Lungs: CTABL.   Abdomen:  Soft, nontender, nondistended.   MSK/Extremities: Warm & well-perfused.   Skin: Warm, dry. No jaundice.       Medications: [Standing]  allopurinol 100 milliGRAM(s) Oral two times a day  aspirin  chewable 81 milliGRAM(s) Oral daily  atorvastatin 10 milliGRAM(s) Oral at bedtime  buDESOnide 160 MICROgram(s)/formoterol 4.5 MICROgram(s) Inhaler 2 Puff(s) Inhalation two times a day  calcium carbonate 1250 mG  + Vitamin D (OsCal 500 + D) 1 Tablet(s) Oral daily  chlorhexidine 4% Liquid 1 Application(s) Topical <User Schedule>  enoxaparin Injectable 40 milliGRAM(s) SubCutaneous at bedtime  isosorbide   mononitrate ER Tablet (IMDUR) 30 milliGRAM(s) Oral daily  levothyroxine 137 MICROGram(s) Oral daily  metoprolol succinate ER 50 milliGRAM(s) Oral daily  montelukast 10 milliGRAM(s) Oral daily  pantoprazole    Tablet 40 milliGRAM(s) Oral every 12 hours    DVT Prophylaxis: enoxaparin Injectable 40 milliGRAM(s) SubCutaneous at bedtime    GI Prophylaxis: pantoprazole    Tablet 40 milliGRAM(s) Oral every 12 hours    Antibiotics:   Anticoagulation:   Medications:[PRN]      Labs:                        12.2   4.23  )-----------( 223      ( 21 May 2019 13:41 )             37.5     05-21    137  |  98  |  16  ----------------------------<  104<H>  5.9<H>   |  24  |  1.2    Ca    9.1      21 May 2019 13:41  Mg     1.9     05-21    TPro  6.1  /  Alb  3.6  /  TBili  0.7  /  DBili  0.4<H>  /  AST  70<H>  /  ALT  138<H>  /  AlkPhos  597<H>  05-21    LIVER FUNCTIONS - ( 21 May 2019 16:19 )  Alb: 3.6 g/dL / Pro: 6.1 g/dL / ALK PHOS: 597 U/L / ALT: 138 U/L / AST: 70 U/L / GGT: x               CARDIAC MARKERS ( 21 May 2019 13:41 )  x     / <0.01 ng/mL / 86 U/L / x     / x            Imaging:     Assessment:  78y Male patient admitted w/ CBD obstruction    Plan:    F/u GI   ERCP  NPO, IVF  F/u LFTs  DVT/GI ppx  OOBAT  IS  Pain control    Date/Time: 05-22-19 @ 04:44

## 2019-05-22 NOTE — PROGRESS NOTE ADULT - ASSESSMENT
79 yo M Hx of CAD, intermediate likelihood for choledocholithiasis admitted for work up.    1)Intermediate likelihood choledocholithiasis- negative recent MRCP  2)CAD on ASA (no DAPT)  3)Abnormal MRI: cyst in the body of the pancreas RO Serous Cystadenoma    Rec:  - Repeat US (pending interpretation by CBD appear to be normal on prelim view)  - Trend LFTs  - May start diet- NPO after midnight  - No need for repeat MRCP  - Will benefit from EUS +/- ERCP +/- sphincterotomy 79 yo M Hx of CAD, intermediate likelihood for choledocholithiasis admitted for work up.    1)Intermediate likelihood choledocholithiasis- negative recent MRCP  2)CAD on ASA (no DAPT)  3)Abnormal MRI: cyst in the body of the pancreas RO Serous Cystadenoma    Rec:  - Repeat US (pending interpretation by CBD appear to be normal on prelim view)  - Trend LFTs  - May start diet- NPO after midnight  - No need for repeat MRCP  - Will benefit from EUS +/- ERCP +/- sphincterotomy    Addendum: Discussed with Dr. Huang in view of the improving ALK phos and normal bilirubin a complete blockage of the CBD is not likely. ERCP and EUS could be arranged to be performed on OP basis.    For questions and inquiries please page [phone].  For urgent matters or after 5pm and on weekends please page the fellow on call through the [employer] system. 79 yo M Hx of CAD, intermediate likelihood for choledocholithiasis admitted for work up.    1)Intermediate likelihood choledocholithiasis- negative recent MRCP  2)CAD on ASA (no DAPT)  3)Abnormal MRI: cyst in the body of the pancreas RO Serous Cystadenoma    Rec:  - Repeat US (pending interpretation by CBD appear to be normal on prelim view)  - Trend LFTs  - May start diet  - No need for repeat MRCP  - Will benefit from EUS +/- ERCP +/- sphincterotomy    Addendum: Discussed with Dr. Huang in view of the improving ALK phos and normal bilirubin a complete blockage of the CBD is not likely. ERCP and EUS could be arranged to be performed on OP basis.    For questions and inquiries please page [phone].  For urgent matters or after 5pm and on weekends please page the fellow on call through the [facility] [employer] system.

## 2019-05-22 NOTE — MEDICAL STUDENT PROGRESS NOTE(EDUCATION) - SUBJECTIVE AND OBJECTIVE BOX
SUBJECTIVE:  Patient is a 78 year old male with a PMHx of lung cancer s/p lobectomy (in remission), thyroid cancer s/p thyroidectomy (in remission), COPD, CAD s/p PCI (2005, 2009), HTN, DLD, ALTAF, GERD, hyperuricemia who presented for a recent admission and discharge to [facility] (05/05 - 05/09) with intermittent epigastric pain, nausea, and vomiting to rule out ascending cholangitis and has been following up as an outpatient with GI and surgery (Dr. Lemon) to schedule an elective cholecystectomy. While at Dr. Lemon's office, patient was found to have a transaminitis (, , alkaline phosphatase 677) and was admitted for the abnormal lab results.    Overnight, there were no acute events. Patient currently has no acute complaints. He was seen and examined this morning and was resting comfortably and reported no pain currently. He denies chest pain, SOB, cough, abdominal pain, nausea, vomiting, diarrhea, constipation, changes in urination, and changes in bowel habits.     OBJECTIVE:    Vitals:  T (F): 96.2                        T (F) range: 96.2 - 96.5  BP: 155/70                       BP range: 118/58 - 155/70  HR: 68                                            RR: 18  SpO2: 96% on RA    Exam:  Gen: Patient is in no acute distress, awake, alert, pleasant and conversive  CV: Regular rate and rhythm, normal S1, S2, no murmurs, rubs, or gallops appreciated  Pulm: CTAB, no wheezes, rales, or rhonchi appreciated  Abd: Bowel sounds present, soft, tender to deep palpation in RUQ, non-distended, no rebound tenderness, negative Mims's sign  Ext: No LE edema, distal pulses 2+, no calf tenderness  Neuro: Alert & Oriented x3, no focal deficits    Labs:    CBC (May 22 @ 05:16):                           Previous CBC (May 21 @ 13:41):                 11.5                                                      12.2  3.19 )------------( 157                               4.23 )------------( 223               35.1                                                      37.5    Coagulation (May 22 @ 05:16):   PT = 12.90 sec  INR = 1.12  PTT = 38.0 sec    CMP (May 22 @ 05:16):                         Previous CMP (May 21 @ 13:41):    139  |   100   |  15                                   137  |    98   |  16  ------------------------< 84                       ------------------------< 104        3.6   |     27   |   0.9                                 5.9   |    24   |   1.2        Radiology: SUBJECTIVE:  Patient is a 78 year old male with a PMHx of lung cancer s/p lobectomy (in remission), thyroid cancer s/p thyroidectomy (in remission), COPD, CAD s/p PCI (2005, 2009), hypertension, dyslipidemia, obstructive sleep apnea, GERD, osteopenia, and hyperuricemia who presented with transaminitis (, , alkaline phosphatase 677) and was admitted for an ERCP.     Overnight, there were no acute events. Patient currently has no acute complaints. He was seen and examined this morning and was resting comfortably and reported no pain currently. He denies chest pain, SOB, cough, abdominal pain, nausea, vomiting, diarrhea, constipation, changes in urination, and changes in bowel habits.     OBJECTIVE:    Vitals:  T (F): 96.2                        T (F) range: 96.2 - 96.5  BP: 155/70                       BP range: 118/58 - 155/70  HR: 68                                            RR: 18  SpO2: 96% on RA    Exam:  Gen: Patient is in no acute distress, awake, alert, pleasant and conversive  CV: Regular rate and rhythm, normal S1, S2, no murmurs, rubs, or gallops appreciated  Pulm: CTAB, no wheezes, rales, or rhonchi appreciated  Abd: Bowel sounds present, soft, tender to deep palpation in RUQ, non-distended, no rebound tenderness, negative Mims's sign, no hepatosplenomegaly  Ext: No LE edema, distal pulses 2+, no calf tenderness  Skin: No jaundice  Neuro: Alert & Oriented x3, no focal deficits    Labs:    CBC (May 22 @ 05:16):                           Previous CBC (May 21 @ 13:41):                 11.5                                                      12.2  3.19 )------------( 157                               4.23 )------------( 223               35.1                                                      37.5    Coagulation (May 22 @ 05:16):   PT = 12.90 sec  INR = 1.12  PTT = 38.0 sec    CMP (May 22 @ 05:16):                         Previous CMP (May 21 @ 13:41):    139  |   100   |  15                                   137  |    98   |  16  ------------------------< 84                       ------------------------< 104        3.6   |     27   |   0.9                                 5.9   |    24   |   1.2      Calcium = 8.3                                        Calcium = 9.1   Magnesium = 1.7  Total protein = 5.8  Albumin = 3.2  Total bilirubin = 0.6  Direct bilirubin = 0.3  AST = 50  ALT = 109  Alkaline phosphatase = 523    Gamma glutamyl transferase (May 22 @ 11:19): 438    Radiology:    Abdominal U/S (May 21 @ 17:47):     INTERPRETATION:  CLINICAL HISTORY: Elevated LFTs.    COMPARISON: 5/5/2019.    PROCEDURE: Ultrasound of the right upper quadrant was performed.    FINDINGS:    LIVER:  Normal in contour and echogenicity measuring 17.2 cm in length.   No focal mass.    GALLBLADDER/BILIARY TREE:  Contracted gallbladder. No cholelithiasis or   pericholecystic fluid. Negative sonographic Mims's sign. No   intrahepatic biliary ductal dilatation. The common bile duct measures 6   mm, which is normal.     PANCREAS: Obscured by overlying bowel gas and cannot be assessed.    KIDNEY:  Right kidney measures 10.9 cm in length. No hydronephrosis,   calculi or solid mass.    AORTA/IVC:  Visualized proximal portions unremarkable.    ASCITES:  None.    IMPRESSION:    1.  Unremarkable liver without evidence for biliary ductal dilation.    2.  No cholelithiasis or sonographic evidence for cholecystitis.        MR Abdomen (May 6 @ 19:48):    1.  No biliary ductal dilatation or evidence of choledocholithiasis.    2.   A 1.7 cm cystic structure within the pancreatic body, possibly a   serous cystadenoma.

## 2019-05-23 ENCOUNTER — TRANSCRIPTION ENCOUNTER (OUTPATIENT)
Age: 79
End: 2019-05-23

## 2019-05-23 LAB
ALBUMIN SERPL ELPH-MCNC: 3.2 G/DL — LOW (ref 3.5–5.2)
ALP SERPL-CCNC: 622 U/L — HIGH (ref 30–115)
ALT FLD-CCNC: 90 U/L — HIGH (ref 0–41)
ANION GAP SERPL CALC-SCNC: 13 MMOL/L — SIGNIFICANT CHANGE UP (ref 7–14)
AST SERPL-CCNC: 48 U/L — HIGH (ref 0–41)
BASOPHILS # BLD AUTO: 0.02 K/UL — SIGNIFICANT CHANGE UP (ref 0–0.2)
BASOPHILS NFR BLD AUTO: 0.6 % — SIGNIFICANT CHANGE UP (ref 0–1)
BILIRUB DIRECT SERPL-MCNC: 0.3 MG/DL — HIGH (ref 0–0.2)
BILIRUB INDIRECT FLD-MCNC: 0.2 MG/DL — SIGNIFICANT CHANGE UP (ref 0.2–1.2)
BILIRUB SERPL-MCNC: 0.5 MG/DL — SIGNIFICANT CHANGE UP (ref 0.2–1.2)
BUN SERPL-MCNC: 17 MG/DL — SIGNIFICANT CHANGE UP (ref 10–20)
CALCIUM SERPL-MCNC: 8.4 MG/DL — LOW (ref 8.5–10.1)
CANCER AG19-9 SERPL-ACNC: 8 U/ML — SIGNIFICANT CHANGE UP
CHLORIDE SERPL-SCNC: 102 MMOL/L — SIGNIFICANT CHANGE UP (ref 98–110)
CO2 SERPL-SCNC: 27 MMOL/L — SIGNIFICANT CHANGE UP (ref 17–32)
CREAT SERPL-MCNC: 0.9 MG/DL — SIGNIFICANT CHANGE UP (ref 0.7–1.5)
EOSINOPHIL # BLD AUTO: 0.17 K/UL — SIGNIFICANT CHANGE UP (ref 0–0.7)
EOSINOPHIL NFR BLD AUTO: 5.5 % — SIGNIFICANT CHANGE UP (ref 0–8)
GLUCOSE SERPL-MCNC: 112 MG/DL — HIGH (ref 70–99)
HCT VFR BLD CALC: 33.8 % — LOW (ref 42–52)
HGB BLD-MCNC: 10.9 G/DL — LOW (ref 14–18)
IMM GRANULOCYTES NFR BLD AUTO: 0.3 % — SIGNIFICANT CHANGE UP (ref 0.1–0.3)
LYMPHOCYTES # BLD AUTO: 0.86 K/UL — LOW (ref 1.2–3.4)
LYMPHOCYTES # BLD AUTO: 27.8 % — SIGNIFICANT CHANGE UP (ref 20.5–51.1)
MAGNESIUM SERPL-MCNC: 1.7 MG/DL — LOW (ref 1.8–2.4)
MCHC RBC-ENTMCNC: 27.3 PG — SIGNIFICANT CHANGE UP (ref 27–31)
MCHC RBC-ENTMCNC: 32.2 G/DL — SIGNIFICANT CHANGE UP (ref 32–37)
MCV RBC AUTO: 84.5 FL — SIGNIFICANT CHANGE UP (ref 80–94)
MONOCYTES # BLD AUTO: 0.44 K/UL — SIGNIFICANT CHANGE UP (ref 0.1–0.6)
MONOCYTES NFR BLD AUTO: 14.2 % — HIGH (ref 1.7–9.3)
NEUTROPHILS # BLD AUTO: 1.59 K/UL — SIGNIFICANT CHANGE UP (ref 1.4–6.5)
NEUTROPHILS NFR BLD AUTO: 51.6 % — SIGNIFICANT CHANGE UP (ref 42.2–75.2)
NRBC # BLD: 0 /100 WBCS — SIGNIFICANT CHANGE UP (ref 0–0)
PLATELET # BLD AUTO: 179 K/UL — SIGNIFICANT CHANGE UP (ref 130–400)
POTASSIUM SERPL-MCNC: 4 MMOL/L — SIGNIFICANT CHANGE UP (ref 3.5–5)
POTASSIUM SERPL-SCNC: 4 MMOL/L — SIGNIFICANT CHANGE UP (ref 3.5–5)
PROT SERPL-MCNC: 5.8 G/DL — LOW (ref 6–8)
RBC # BLD: 4 M/UL — LOW (ref 4.7–6.1)
RBC # FLD: 14.6 % — HIGH (ref 11.5–14.5)
SODIUM SERPL-SCNC: 142 MMOL/L — SIGNIFICANT CHANGE UP (ref 135–146)
WBC # BLD: 3.09 K/UL — LOW (ref 4.8–10.8)
WBC # FLD AUTO: 3.09 K/UL — LOW (ref 4.8–10.8)

## 2019-05-23 PROCEDURE — 99231 SBSQ HOSP IP/OBS SF/LOW 25: CPT

## 2019-05-23 RX ORDER — ATORVASTATIN CALCIUM 80 MG/1
10 TABLET, FILM COATED ORAL AT BEDTIME
Refills: 0 | Status: DISCONTINUED | OUTPATIENT
Start: 2019-05-23 | End: 2019-05-23

## 2019-05-23 RX ORDER — MAGNESIUM OXIDE 400 MG ORAL TABLET 241.3 MG
400 TABLET ORAL
Refills: 0 | Status: DISCONTINUED | OUTPATIENT
Start: 2019-05-23 | End: 2019-05-25

## 2019-05-23 RX ORDER — ALLOPURINOL 300 MG
100 TABLET ORAL
Refills: 0 | Status: DISCONTINUED | OUTPATIENT
Start: 2019-05-23 | End: 2019-05-23

## 2019-05-23 RX ADMIN — BUDESONIDE AND FORMOTEROL FUMARATE DIHYDRATE 2 PUFF(S): 160; 4.5 AEROSOL RESPIRATORY (INHALATION) at 21:09

## 2019-05-23 RX ADMIN — Medication 81 MILLIGRAM(S): at 12:00

## 2019-05-23 RX ADMIN — MAGNESIUM OXIDE 400 MG ORAL TABLET 400 MILLIGRAM(S): 241.3 TABLET ORAL at 17:31

## 2019-05-23 RX ADMIN — VALSARTAN 160 MILLIGRAM(S): 80 TABLET ORAL at 06:07

## 2019-05-23 RX ADMIN — MONTELUKAST 10 MILLIGRAM(S): 4 TABLET, CHEWABLE ORAL at 12:00

## 2019-05-23 RX ADMIN — Medication 50 MILLIGRAM(S): at 06:07

## 2019-05-23 RX ADMIN — Medication 1 TABLET(S): at 12:00

## 2019-05-23 RX ADMIN — BUDESONIDE AND FORMOTEROL FUMARATE DIHYDRATE 2 PUFF(S): 160; 4.5 AEROSOL RESPIRATORY (INHALATION) at 09:22

## 2019-05-23 RX ADMIN — ISOSORBIDE MONONITRATE 30 MILLIGRAM(S): 60 TABLET, EXTENDED RELEASE ORAL at 12:00

## 2019-05-23 RX ADMIN — ENOXAPARIN SODIUM 40 MILLIGRAM(S): 100 INJECTION SUBCUTANEOUS at 21:09

## 2019-05-23 RX ADMIN — PANTOPRAZOLE SODIUM 40 MILLIGRAM(S): 20 TABLET, DELAYED RELEASE ORAL at 17:31

## 2019-05-23 RX ADMIN — PANTOPRAZOLE SODIUM 40 MILLIGRAM(S): 20 TABLET, DELAYED RELEASE ORAL at 06:07

## 2019-05-23 RX ADMIN — Medication 137 MICROGRAM(S): at 06:07

## 2019-05-23 NOTE — PROGRESS NOTE ADULT - SUBJECTIVE AND OBJECTIVE BOX
Hospital Day:  2d    Subjective:    Patient is a 78y old  Male who presents with a chief complaint of Transaminitis (22 May 2019 10:57)    Overnight event of some mild tenderness in his epigastric region after he ate which resolved on its own after a period of ten minutes.. He was seen and examined at bedside this morning. He stated that this morning he does not have any acute complaints. He has been NPO since midnight for procedure. Confirmed with advanced GI that he will be taken this afternoon for an EUS with ERCP and possible sphincterotomy.     Past Medical Hx:   Osteopenia  Hyperuricemia  Lung cancer  Coronary artery disease involving native heart, angina presence unspecified, unspecified vessel or lesion type  ALTAF (obstructive sleep apnea)  Chronic obstructive pulmonary disease, unspecified COPD type  Dyslipidemia  Thyroid cancer  Hypertension, unspecified type  Gastroesophageal reflux disease, esophagitis presence not specified  Malignant neoplasm of lung, unspecified laterality, unspecified part of lung    Past Sx:  H/O thyroidectomy  S/P lobectomy of lung    Allergies:  No Known Allergies    Current Meds:   Standng Meds:  aspirin  chewable 81 milliGRAM(s) Oral daily  buDESOnide 160 MICROgram(s)/formoterol 4.5 MICROgram(s) Inhaler 2 Puff(s) Inhalation two times a day  calcium carbonate 1250 mG  + Vitamin D (OsCal 500 + D) 1 Tablet(s) Oral daily  chlorhexidine 4% Liquid 1 Application(s) Topical <User Schedule>  enoxaparin Injectable 40 milliGRAM(s) SubCutaneous at bedtime  isosorbide   mononitrate ER Tablet (IMDUR) 30 milliGRAM(s) Oral daily  levothyroxine 137 MICROGram(s) Oral daily  magnesium oxide 400 milliGRAM(s) Oral two times a day with meals  metoprolol succinate ER 50 milliGRAM(s) Oral daily  montelukast 10 milliGRAM(s) Oral daily  pantoprazole    Tablet 40 milliGRAM(s) Oral every 12 hours  valsartan 160 milliGRAM(s) Oral daily    PRN Meds:    HOME MEDICATIONS:  allopurinol 100 mg oral tablet: 1 tab(s) orally 2 times a day  aspirin 81 mg oral tablet, chewable: 1 tab(s) orally once a day  atorvastatin 10 mg oral tablet: 1 tab(s) orally once a day (at bedtime)  Breo Ellipta 100 mcg-25 mcg/inh inhalation powder: 1 puff(s) inhaled once a day  Calcium 600+D oral tablet: 1 tab(s) orally 2 times a day  isosorbide mononitrate 30 mg oral tablet, extended release: 1 tab(s) orally once a day (in the morning)  Metoprolol Succinate ER 50 mg oral tablet, extended release: 1 tab(s) orally once a day  montelukast 10 mg oral tablet: 1 tab(s) orally once a day  Protonix 40 mg oral delayed release tablet: 40 milligram(s) orally 2 times a day  Synthroid 137 mcg (0.137 mg) oral tablet: 1 tab(s) orally 3 times a week  Synthroid 150 mcg (0.15 mg) oral tablet: 1 tab(s) orally 4 times a week  valsartan 160 mg oral capsule: 1 tab(s) orally once a day      Vital Signs:   T(F): 96 (05-23-19 @ 04:57), Max: 97.1 (05-22-19 @ 21:24)  HR: 69 (05-23-19 @ 04:57) (57 - 69)  BP: 128/64 (05-23-19 @ 04:57) (117/62 - 130/62)  RR: 18 (05-23-19 @ 04:57) (18 - 20)  SpO2: 94% (05-22-19 @ 19:31) (94% - 94%)    Physical Exam:   GENERAL: NAD, Lying in bed, pleasant and conversive appearing stated age  HEENT: NCAT, PERRLA, EOMI. Neck noted for indentation on anterior lower neck from prior surgery  CHEST/LUNG: CTA, No wheezing, rhonchi, rales  HEART: Regular rate and rhythm; s1 s2 appreciated, No murmurs, rubs, or gallops  ABDOMEN: Soft, Mild tenderness to deep palpation on RUQ and LLQ, Nondistended; Bowel sounds present, No rebound tenderness, Mims negative, no hepatomegaly felt   EXTREMITIES: No LE edema b/l, Peripheral pulses 2+, No cyanosis, No clubbing  NERVOUS SYSTEM:  Alert & Oriented X3, non focal    Labs:                         10.9   3.09  )-----------( 179      ( 23 May 2019 05:26 )             33.8     Neutophil% 51.6, Lymphocyte% 27.8, Monocyte% 14.2, Bands% 0.3 05-23-19 @ 05:26    23 May 2019 05:26    142    |  102    |  17     ----------------------------<  112    4.0     |  27     |  0.9      Ca    8.4        23 May 2019 05:26  Mg     1.7       23 May 2019 05:26    TPro  5.8    /  Alb  3.2    /  TBili  0.5    /  DBili  0.3    /  AST  48     /  ALT  90     /  AlkPhos  622    23 May 2019 05:26    Amylase --, Lipase 30, 05-21-19 @ 13:41    Troponin <0.01, CKMB --, CK 86 05-21-19 @ 13:41  Radiology:   None today     Assessment and Plan:   This is a 78 year old male with a PMHx of Lung cancer s/p lobectomy (noted in remission), thyroid cancer (noted in remission), history of COPD, CAD s/p PCI (2005, 2007), Hypertension, Dyslipidemia, with recent admission and discharge for epigastric pain who presented with elevated labs    #Transaminitis likely secondary to choledocholithiasis with an 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 This morning AST 48, ALT 90, Alk Phos 622  - GGT >400  - RUQ sono negative   - Advanced GI team following; plan for EUS with ERCP and possible sphincterotomy today  - Low fat diet for now, NPO after midnight  - Resume atorvastatin and allopurinol as AST and ALT are under 100 today.     #Cystic Structure in Pancreas  - CA 19-9: 8  - Unlikely to be cancerous and the cause of LFT elevation    #COPD   - Continue with home inhalers (Symbicort) and montelukast    #CAD s/p PCI in 2005 and 2007  - Continue on Aspirin, Imdur and Metoprolol     #Thyroid Cancer in remission  - Continue with Synthroid 137mcg    #Suspected Vitamin D deficiency  - Continue on calcium carbonate and Vitamin D    #Hyperkalemia (hemolyzed)  - Continue on Diovan 160mg     Activity: As tolerated  Diet: Low Fat; NPO at midnight  DVT ppx: Lovenox  GI ppx: Protonix  Code Status: full code  DISPO: From home, pending GI procedure Hospital Day:  2d    Subjective:    Patient is a 78y old  Male who presents with a chief complaint of Transaminitis (22 May 2019 10:57)    Overnight event of some mild tenderness in his epigastric region after he ate which resolved on its own after a period of ten minutes.. He was seen and examined at bedside this morning. He stated that this morning he does not have any acute complaints. He has been NPO since midnight for procedure. Confirmed with advanced GI that he will be taken this afternoon for an EUS with ERCP and possible sphincterotomy.     2PM update: Patient to have procedure tomorrow morning. NPO ordered for after midnight; can eat low fat diet today    Past Medical Hx:   Osteopenia  Hyperuricemia  Lung cancer  Coronary artery disease involving native heart, angina presence unspecified, unspecified vessel or lesion type  ALTAF (obstructive sleep apnea)  Chronic obstructive pulmonary disease, unspecified COPD type  Dyslipidemia  Thyroid cancer  Hypertension, unspecified type  Gastroesophageal reflux disease, esophagitis presence not specified  Malignant neoplasm of lung, unspecified laterality, unspecified part of lung    Past Sx:  H/O thyroidectomy  S/P lobectomy of lung    Allergies:  No Known Allergies    Current Meds:   Standng Meds:  aspirin  chewable 81 milliGRAM(s) Oral daily  buDESOnide 160 MICROgram(s)/formoterol 4.5 MICROgram(s) Inhaler 2 Puff(s) Inhalation two times a day  calcium carbonate 1250 mG  + Vitamin D (OsCal 500 + D) 1 Tablet(s) Oral daily  chlorhexidine 4% Liquid 1 Application(s) Topical <User Schedule>  enoxaparin Injectable 40 milliGRAM(s) SubCutaneous at bedtime  isosorbide   mononitrate ER Tablet (IMDUR) 30 milliGRAM(s) Oral daily  levothyroxine 137 MICROGram(s) Oral daily  magnesium oxide 400 milliGRAM(s) Oral two times a day with meals  metoprolol succinate ER 50 milliGRAM(s) Oral daily  montelukast 10 milliGRAM(s) Oral daily  pantoprazole    Tablet 40 milliGRAM(s) Oral every 12 hours  valsartan 160 milliGRAM(s) Oral daily    PRN Meds:    HOME MEDICATIONS:  allopurinol 100 mg oral tablet: 1 tab(s) orally 2 times a day  aspirin 81 mg oral tablet, chewable: 1 tab(s) orally once a day  atorvastatin 10 mg oral tablet: 1 tab(s) orally once a day (at bedtime)  Breo Ellipta 100 mcg-25 mcg/inh inhalation powder: 1 puff(s) inhaled once a day  Calcium 600+D oral tablet: 1 tab(s) orally 2 times a day  isosorbide mononitrate 30 mg oral tablet, extended release: 1 tab(s) orally once a day (in the morning)  Metoprolol Succinate ER 50 mg oral tablet, extended release: 1 tab(s) orally once a day  montelukast 10 mg oral tablet: 1 tab(s) orally once a day  Protonix 40 mg oral delayed release tablet: 40 milligram(s) orally 2 times a day  Synthroid 137 mcg (0.137 mg) oral tablet: 1 tab(s) orally 3 times a week  Synthroid 150 mcg (0.15 mg) oral tablet: 1 tab(s) orally 4 times a week  valsartan 160 mg oral capsule: 1 tab(s) orally once a day      Vital Signs:   T(F): 96 (05-23-19 @ 04:57), Max: 97.1 (05-22-19 @ 21:24)  HR: 69 (05-23-19 @ 04:57) (57 - 69)  BP: 128/64 (05-23-19 @ 04:57) (117/62 - 130/62)  RR: 18 (05-23-19 @ 04:57) (18 - 20)  SpO2: 94% (05-22-19 @ 19:31) (94% - 94%)    Physical Exam:   GENERAL: NAD, Lying in bed, pleasant and conversive appearing stated age  HEENT: NCAT, PERRLA, EOMI. Neck noted for indentation on anterior lower neck from prior surgery  CHEST/LUNG: CTA, No wheezing, rhonchi, rales  HEART: Regular rate and rhythm; s1 s2 appreciated, No murmurs, rubs, or gallops  ABDOMEN: Soft, Mild tenderness to deep palpation on RUQ and LLQ, Nondistended; Bowel sounds present, No rebound tenderness, Mims negative, no hepatomegaly felt   EXTREMITIES: No LE edema b/l, Peripheral pulses 2+, No cyanosis, No clubbing  NERVOUS SYSTEM:  Alert & Oriented X3, non focal    Labs:                         10.9   3.09  )-----------( 179      ( 23 May 2019 05:26 )             33.8     Neutophil% 51.6, Lymphocyte% 27.8, Monocyte% 14.2, Bands% 0.3 05-23-19 @ 05:26    23 May 2019 05:26    142    |  102    |  17     ----------------------------<  112    4.0     |  27     |  0.9      Ca    8.4        23 May 2019 05:26  Mg     1.7       23 May 2019 05:26    TPro  5.8    /  Alb  3.2    /  TBili  0.5    /  DBili  0.3    /  AST  48     /  ALT  90     /  AlkPhos  622    23 May 2019 05:26    Amylase --, Lipase 30, 05-21-19 @ 13:41    Troponin <0.01, CKMB --, CK 86 05-21-19 @ 13:41  Radiology:   None today     Assessment and Plan:   This is a 78 year old male with a PMHx of Lung cancer s/p lobectomy (noted in remission), thyroid cancer (noted in remission), history of COPD, CAD s/p PCI (2005, 2007), Hypertension, Dyslipidemia, with recent admission and discharge for epigastric pain who presented with elevated labs    #Transaminitis likely secondary to choledocholithiasis with an 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 This morning AST 48, ALT 90, Alk Phos 622  - GGT >400  - RUQ sono negative   - [facility] team following; plan for EUS with ERCP and possible sphincterotomy tomorrow  - Low fat diet for now, NPO after midnight  - Resume atorvastatin and allopurinol after procedure     #Cystic Structure in Pancreas  - CA 19-9: 8  - Unlikely to be cancerous and the cause of LFT elevation    #COPD   - Continue with home inhalers (Symbicort) and montelukast    #CAD s/p PCI in 2005 and 2007  - Continue on Aspirin, Imdur and Metoprolol     #Thyroid Cancer in remission  - Continue with Synthroid 137mcg    #Suspected Vitamin D deficiency  - Continue on calcium carbonate and Vitamin D    #Hyperkalemia (hemolyzed)  - Continue on Diovan 160mg     Activity: As tolerated  Diet: Low Fat; NPO at midnight  DVT ppx: Lovenox  GI ppx: Protonix  Code Status: full code  DISPO: From home, pending GI procedure

## 2019-05-23 NOTE — DISCHARGE NOTE PROVIDER - CARE PROVIDER_API CALL
Antonio Barragan)  Gastroenterology; Internal Medicine  [address] [place] [address]  Phone: [phone]  Fax: [phone]  Follow Up Time:     Benji Andrade)  Internal Medicine  [address] [place] [id number]  Phone: [phone]  Fax: [phone]  Follow Up Time:

## 2019-05-23 NOTE — DISCHARGE NOTE PROVIDER - HOSPITAL COURSE
This is a 78 year old male with history of lung cancer s/p lobectomy, thyroid cancer in remission, history of COPD, CAD s/p PCI, Hypertension, dyslipidemia who presetned with abnormal LFTs. He was monitored and noted to have downtrending enzymes. An EUS and ERCP was performed during this hospitalization.

## 2019-05-23 NOTE — PROGRESS NOTE ADULT - ASSESSMENT
77 yo M PMH of Lung ca s/p lobectomy (in remission), Thyroid cancer (in remission), hx of COPD, CAD s/p PCI (2005, 2007), HTN, DLD present for abnormal labwork at Dr. Lemon's office.    # Abnormal LFT's   ALK PHOS: 677 U/L / ALT: 158 U/L / AST: 112 U/L / GGT also high - these are newly abnl w/in past mo (spoke w/ PMD)  RUQ sono: no GB stones; no CBD dilation  LFT q24 after ERCP  spoke w/ GI - ERCP today; NPO; IVFs  no manometric testing advised - causes too much pancreatitis  plan is for ERCP w/ sphincterotomy (? stent) and EUS of Panc cyst  will eventually need GB rxn once recovered (not on this admission)  hold allopurinol and statin until LFTs better (once AST and ALT < 100) - prob resume tomorrow    # cystic structure in pancrease on MRI is NOT the cause of pain or the abnl LFTs  pt will prob have EUS to further evaluate this  CA 19-9 = 8    # No Hyperkalemia - hemolyzed specimen  can use ARB    # COPD  -c/w home inhalers    # CAD s/p PCI (2005, 2007)  c/w home meds (ASA and statin)    # Thyroid cancer -in remission  c/w home med (synthroid)    # Lung cancer, s/p rxn - in remission    # DVT ppx: LMWH (on hold for ERCP)    # GI ppx: no needed    # Code Status -full code    # Dispo: f/u GI for ERCP/EUS; prob home tomorrow

## 2019-05-23 NOTE — DISCHARGE NOTE PROVIDER - NSDCCPCAREPLAN_GEN_ALL_CORE_FT
PRINCIPAL DISCHARGE DIAGNOSIS  Diagnosis: Transaminitis  Assessment and Plan of Treatment: You were found to have transaminitis as a result of a suspected choledocholithiasis vs sphincter of Oddi dysfunction. You had an EUS with ERCP during this hospitalization.

## 2019-05-23 NOTE — MEDICAL STUDENT PROGRESS NOTE(EDUCATION) - SUBJECTIVE AND OBJECTIVE BOX
SUBJECTIVE:  Patient is a 78 year old male with a PMHx of lung cancer s/p lobectomy (in remission), thyroid cancer s/p thyroidectomy (in remission), COPD, CAD s/p PCI (2005, 2009), hypertension, dyslipidemia, obstructive sleep apnea, GERD, osteopenia, and hyperuricemia who presented with transaminitis (, , alkaline phosphatase 677) and was admitted for an ERCP.     Overnight, there were no acute events. Patient currently has no acute complaints. He reported an episode of epigastric pain after eating dinner yesterday that was non-radiating, lasted about 10 minutes, was not associated with nausea or vomiting, and resolved on its own. He was seen and examined this morning and was resting comfortably and reported no pain currently. He denies chest pain, SOB, cough, abdominal pain, nausea, vomiting, diarrhea, constipation, changes in urination, and changes in bowel habits.     OBJECTIVE:    Vitals:  T (F): 96                          T (F) range: 96 - 97.1  BP: 128/64                      BP range: 117/62 - 155/70  HR: 69                                            RR: 18  SpO2: 94% on RA    Exam:  Gen: Patient is in no acute distress, awake, alert, pleasant and conversive  CV: Regular rate and rhythm, normal S1, S2, no murmurs, rubs, or gallops appreciated  Pulm: CTAB, no wheezes, rales, or rhonchi appreciated  Abd: Bowel sounds present, soft, non-tender, non-distended, no rebound tenderness, negative Mims's sign, no hepatosplenomegaly, no CVA tenderness  Ext: No LE edema, distal pulses 2+, no calf tenderness  Skin: No jaundice  Neuro: Alert & Oriented x3, no focal deficits    Labs:    CBC (May 23 @ 05:26):                           Previous CBC (May 22 @ 05:16):                 10.9                                                      11.5  3.09 )------------( 179                               3.19 )------------( 159               33.8                                                      35.1    Coagulation (May 22 @ 05:16):   PT = 12.90 sec  INR = 1.12  PTT = 38.0 sec    CMP (May 23 @ 05:26):                         Previous CMP (May 22 @ 05:16):    142  |   102   |  17                                   139  |    100   |  15  ------------------------< 112                      ------------------------< 84        4.0   |     27   |   0.9                                 3.6   |    27    |   0.9      Calcium = 8.4                                        Calcium = 8.3   Magnesium = 1.7                             Total protein = 5.8                                   Albumin = 3.2  Total bilirubin = 0.6  Direct bilirubin = 0.3  AST = 48  ALT = 90  Alkaline phosphatase = 622    Gamma glutamyl transferase (May 22 @ 11:19): 438    Radiology:  None today

## 2019-05-23 NOTE — PROGRESS NOTE ADULT - SUBJECTIVE AND OBJECTIVE BOX
HERIBERTO RODRIGUEZ  78y  Male  ***My note supersedes ALL resident notes that I sign.  My corrections for their notes are in my note.***    I can be reached directly on [employer] [id number]. My office number is [phone]. My personal cell number is [phone].    INTERVAL EVENTS: Here for f/u of abd pain. Pt had a short (10-15min) episode last night. Feels fine today. Not eating. Awaits ERCP.    T(F): 97.1 (05-23-19 @ 11:04), Max: 97.1 (05-22-19 @ 21:24)  HR: 69 (05-23-19 @ 04:57) (57 - 69)  BP: 128/64 (05-23-19 @ 11:04) (117/62 - 130/62)  RR: 18 (05-23-19 @ 11:04) (18 - 20)  SpO2: 95% (05-23-19 @ 11:04) (94% - 95%)    Gen- No acute distress  Cardio- normal S1, S2 rrr   Pulm- CTA B/L  Abd- nontender, nondistended no HS megaly  Ext- no cyanosis or edema  Neuro- AOx4, nonfocal  Psych- cooperative, appropriate    LABS:                        10.9    (    84.5   3.09  )-----------( ---------      179      ( 23 May 2019 05:26 )             33.8    (    14.6     142   (   102   (   112      05-23-19 @ 05:26  ----------------------               4.0   (   27   (   17                             -----                        0.9  Ca  8.4   Mg  1.7    P   --     LFT  5.8  (  0.5  (  48       05-23-19 @ 05:26  -------------------------  3.2  (  622  (  90    T melvin 0.5     AST 48  ALT 90  05-23-19 @ 05:26  T melvin 0.6     AST 50    05-22-19 @ 05:16  T melvin 0.7     AST 70    05-21-19 @ 16:19  T melvin 0.7         05-21-19 @ 14:52    PT/INR - ( 22 May 2019 05:16 )   PT: 12.90 sec;   INR: 1.12 ratio    PTT - ( 22 May 2019 05:16 )  PTT:38.0 sec    CARDIAC MARKERS ( 21 May 2019 13:41 )  x     / <0.01 ng/mL / 86 U/L / x     / x        RADIOLOGY & ADDITIONAL TESTS:      MEDICATIONS:    allopurinol 100 milliGRAM(s) Oral two times a day  aspirin  chewable 81 milliGRAM(s) Oral daily  atorvastatin 10 milliGRAM(s) Oral at bedtime  buDESOnide 160 MICROgram(s)/formoterol 4.5 MICROgram(s) Inhaler 2 Puff(s) Inhalation two times a day  calcium carbonate 1250 mG  + Vitamin D (OsCal 500 + D) 1 Tablet(s) Oral daily  chlorhexidine 4% Liquid 1 Application(s) Topical <User Schedule>  enoxaparin Injectable 40 milliGRAM(s) SubCutaneous at bedtime  isosorbide   mononitrate ER Tablet (IMDUR) 30 milliGRAM(s) Oral daily  levothyroxine 137 MICROGram(s) Oral daily  magnesium oxide 400 milliGRAM(s) Oral two times a day with meals  metoprolol succinate ER 50 milliGRAM(s) Oral daily  montelukast 10 milliGRAM(s) Oral daily  pantoprazole    Tablet 40 milliGRAM(s) Oral every 12 hours  valsartan 160 milliGRAM(s) Oral daily

## 2019-05-23 NOTE — PROGRESS NOTE ADULT - SUBJECTIVE AND OBJECTIVE BOX
Patient is a 77 y/o with PMHx of CAD , Lung Ca, osteopenia, COPD, ALTAF sent in by his surgeon for elevated Alk phos, AST and ALT. Patient awaiting ERCP. Patient feels well without complaints.     Vital Signs:  T(F): 96   HR: 95  BP: 127/63   RR: 18   SpO2: 95%  Physical Exam  Gen: NAD  HEENT: NC/AT  Cardio: S1/S2  Resp: CTA B/L  Abdomen: Soft, ND/NT  Neuro: AAOx3                          10.9   3.09  )-----------( 179      ( 23 May 2019 05:26 )             33.8   05-23    142  |  102  |  17  ----------------------------<  112<H>  4.0   |  27  |  0.9    Ca    8.4<L>      23 May 2019 05:26  Mg     1.7     05-23    TPro  5.8<L>  /  Alb  3.2<L>  /  TBili  0.5  /  DBili  0.3<H>  /  AST  48<H>  /  ALT  90<H>  /  AlkPhos  622<H>  05-23        MR Abdomen No Cont 05.06.19  COMPARISON MR:  None. Correlation is made with abdominal CT dated May 5,   2019.      IMPRESSION:    1.  No biliary ductal dilatation or evidence of choledocholithiasis.    2.   A 1.7 cm cystic structure within the pancreatic body, possibly a   serous cystadenoma.

## 2019-05-23 NOTE — PROGRESS NOTE ADULT - ASSESSMENT
Patient is a 79 y/o with PMHx of CAD , Lung Ca, osteopenia, COPD, ALTAF sent in by his surgeon for elevated Alk phos, AST and ALT. Patient awaiting ERCP. Patient feels well without complaints.     Abnormal Liver function studies  - Plan ERCP 5/24  - NPO after midnight  - Hold AC AP 5/24  - Routine AM labs 5/24 CBC, CMP  - Will follow

## 2019-05-24 ENCOUNTER — TRANSCRIPTION ENCOUNTER (OUTPATIENT)
Age: 79
End: 2019-05-24

## 2019-05-24 LAB
ALBUMIN SERPL ELPH-MCNC: 3.5 G/DL — SIGNIFICANT CHANGE UP (ref 3.5–5.2)
ALP SERPL-CCNC: 471 U/L — HIGH (ref 30–115)
ALT FLD-CCNC: 63 U/L — HIGH (ref 0–41)
ANION GAP SERPL CALC-SCNC: 13 MMOL/L — SIGNIFICANT CHANGE UP (ref 7–14)
AST SERPL-CCNC: 30 U/L — SIGNIFICANT CHANGE UP (ref 0–41)
BASOPHILS # BLD AUTO: 0.01 K/UL — SIGNIFICANT CHANGE UP (ref 0–0.2)
BASOPHILS NFR BLD AUTO: 0.3 % — SIGNIFICANT CHANGE UP (ref 0–1)
BILIRUB DIRECT SERPL-MCNC: 0.4 MG/DL — HIGH (ref 0–0.2)
BILIRUB INDIRECT FLD-MCNC: 0.3 MG/DL — SIGNIFICANT CHANGE UP (ref 0.2–1.2)
BILIRUB SERPL-MCNC: 0.7 MG/DL — SIGNIFICANT CHANGE UP (ref 0.2–1.2)
BUN SERPL-MCNC: 14 MG/DL — SIGNIFICANT CHANGE UP (ref 10–20)
CALCIUM SERPL-MCNC: 8.7 MG/DL — SIGNIFICANT CHANGE UP (ref 8.5–10.1)
CHLORIDE SERPL-SCNC: 101 MMOL/L — SIGNIFICANT CHANGE UP (ref 98–110)
CO2 SERPL-SCNC: 28 MMOL/L — SIGNIFICANT CHANGE UP (ref 17–32)
CREAT SERPL-MCNC: 0.9 MG/DL — SIGNIFICANT CHANGE UP (ref 0.7–1.5)
EOSINOPHIL # BLD AUTO: 0.12 K/UL — SIGNIFICANT CHANGE UP (ref 0–0.7)
EOSINOPHIL NFR BLD AUTO: 3.3 % — SIGNIFICANT CHANGE UP (ref 0–8)
GLUCOSE SERPL-MCNC: 110 MG/DL — HIGH (ref 70–99)
HCT VFR BLD CALC: 34.2 % — LOW (ref 42–52)
HGB BLD-MCNC: 11.2 G/DL — LOW (ref 14–18)
IMM GRANULOCYTES NFR BLD AUTO: 0.3 % — SIGNIFICANT CHANGE UP (ref 0.1–0.3)
LYMPHOCYTES # BLD AUTO: 0.89 K/UL — LOW (ref 1.2–3.4)
LYMPHOCYTES # BLD AUTO: 24.3 % — SIGNIFICANT CHANGE UP (ref 20.5–51.1)
MAGNESIUM SERPL-MCNC: 1.6 MG/DL — LOW (ref 1.8–2.4)
MCHC RBC-ENTMCNC: 28.2 PG — SIGNIFICANT CHANGE UP (ref 27–31)
MCHC RBC-ENTMCNC: 32.7 G/DL — SIGNIFICANT CHANGE UP (ref 32–37)
MCV RBC AUTO: 86.1 FL — SIGNIFICANT CHANGE UP (ref 80–94)
MONOCYTES # BLD AUTO: 0.48 K/UL — SIGNIFICANT CHANGE UP (ref 0.1–0.6)
MONOCYTES NFR BLD AUTO: 13.1 % — HIGH (ref 1.7–9.3)
NEUTROPHILS # BLD AUTO: 2.15 K/UL — SIGNIFICANT CHANGE UP (ref 1.4–6.5)
NEUTROPHILS NFR BLD AUTO: 58.7 % — SIGNIFICANT CHANGE UP (ref 42.2–75.2)
NRBC # BLD: 0 /100 WBCS — SIGNIFICANT CHANGE UP (ref 0–0)
PHOSPHATE SERPL-MCNC: 3.5 MG/DL — SIGNIFICANT CHANGE UP (ref 2.1–4.9)
PLATELET # BLD AUTO: 158 K/UL — SIGNIFICANT CHANGE UP (ref 130–400)
POTASSIUM SERPL-MCNC: 3.9 MMOL/L — SIGNIFICANT CHANGE UP (ref 3.5–5)
POTASSIUM SERPL-SCNC: 3.9 MMOL/L — SIGNIFICANT CHANGE UP (ref 3.5–5)
PROT SERPL-MCNC: 5.9 G/DL — LOW (ref 6–8)
RBC # BLD: 3.97 M/UL — LOW (ref 4.7–6.1)
RBC # FLD: 14.5 % — SIGNIFICANT CHANGE UP (ref 11.5–14.5)
SODIUM SERPL-SCNC: 142 MMOL/L — SIGNIFICANT CHANGE UP (ref 135–146)
WBC # BLD: 3.66 K/UL — LOW (ref 4.8–10.8)
WBC # FLD AUTO: 3.66 K/UL — LOW (ref 4.8–10.8)

## 2019-05-24 RX ORDER — ATORVASTATIN CALCIUM 80 MG/1
10 TABLET, FILM COATED ORAL AT BEDTIME
Refills: 0 | Status: DISCONTINUED | OUTPATIENT
Start: 2019-05-25 | End: 2019-05-25

## 2019-05-24 RX ORDER — ASPIRIN/CALCIUM CARB/MAGNESIUM 324 MG
81 TABLET ORAL DAILY
Refills: 0 | Status: DISCONTINUED | OUTPATIENT
Start: 2019-05-25 | End: 2019-05-25

## 2019-05-24 RX ORDER — ALLOPURINOL 300 MG
100 TABLET ORAL
Refills: 0 | Status: DISCONTINUED | OUTPATIENT
Start: 2019-05-25 | End: 2019-05-25

## 2019-05-24 RX ORDER — BUDESONIDE AND FORMOTEROL FUMARATE DIHYDRATE 160; 4.5 UG/1; UG/1
2 AEROSOL RESPIRATORY (INHALATION) AT BEDTIME
Refills: 0 | Status: DISCONTINUED | OUTPATIENT
Start: 2019-05-24 | End: 2019-05-25

## 2019-05-24 RX ORDER — INDOMETHACIN 50 MG
50 CAPSULE ORAL
Refills: 0 | Status: COMPLETED | OUTPATIENT
Start: 2019-05-24 | End: 2019-05-24

## 2019-05-24 RX ADMIN — Medication 1 TABLET(S): at 12:30

## 2019-05-24 RX ADMIN — Medication 50 MILLIGRAM(S): at 15:33

## 2019-05-24 RX ADMIN — MAGNESIUM OXIDE 400 MG ORAL TABLET 400 MILLIGRAM(S): 241.3 TABLET ORAL at 17:29

## 2019-05-24 RX ADMIN — Medication 50 MILLIGRAM(S): at 05:19

## 2019-05-24 RX ADMIN — ENOXAPARIN SODIUM 40 MILLIGRAM(S): 100 INJECTION SUBCUTANEOUS at 21:48

## 2019-05-24 RX ADMIN — BUDESONIDE AND FORMOTEROL FUMARATE DIHYDRATE 2 PUFF(S): 160; 4.5 AEROSOL RESPIRATORY (INHALATION) at 21:48

## 2019-05-24 RX ADMIN — Medication 50 MILLIGRAM(S): at 14:53

## 2019-05-24 RX ADMIN — Medication 50 MILLIGRAM(S): at 16:03

## 2019-05-24 RX ADMIN — PANTOPRAZOLE SODIUM 40 MILLIGRAM(S): 20 TABLET, DELAYED RELEASE ORAL at 17:29

## 2019-05-24 RX ADMIN — ISOSORBIDE MONONITRATE 30 MILLIGRAM(S): 60 TABLET, EXTENDED RELEASE ORAL at 12:30

## 2019-05-24 RX ADMIN — VALSARTAN 160 MILLIGRAM(S): 80 TABLET ORAL at 05:17

## 2019-05-24 RX ADMIN — MAGNESIUM OXIDE 400 MG ORAL TABLET 400 MILLIGRAM(S): 241.3 TABLET ORAL at 09:21

## 2019-05-24 RX ADMIN — Medication 137 MICROGRAM(S): at 05:18

## 2019-05-24 RX ADMIN — MONTELUKAST 10 MILLIGRAM(S): 4 TABLET, CHEWABLE ORAL at 12:30

## 2019-05-24 RX ADMIN — PANTOPRAZOLE SODIUM 40 MILLIGRAM(S): 20 TABLET, DELAYED RELEASE ORAL at 05:17

## 2019-05-24 NOTE — PROGRESS NOTE ADULT - SUBJECTIVE AND OBJECTIVE BOX
GENERAL SURGERY PROGRESS NOTE     HERIBERTO RODRIGUEZ  78y  Male  Hospital day :3d    OVERNIGHT EVENTS:  No acute events overnight. Going for ERCP today with GI.   T(F): 96.3 (05-24-19 @ 04:59), Max: 97.1 (05-23-19 @ 11:04)  HR: 71 (05-24-19 @ 04:59) (67 - 95)  BP: 141/67 (05-24-19 @ 04:59) (127/63 - 141/67)  RR: 18 (05-24-19 @ 04:59) (18 - 18)  SpO2: 97% (05-23-19 @ 23:31) (95% - 97%)    DIET/FLUIDS: calcium carbonate 1250 mG  + Vitamin D (OsCal 500 + D) 1 Tablet(s) Oral daily  magnesium oxide 400 milliGRAM(s) Oral two times a day with meals      GI proph:  pantoprazole    Tablet 40 milliGRAM(s) Oral every 12 hours    AC/ proph: aspirin  chewable 81 milliGRAM(s) Oral daily    ABx:     PHYSICAL EXAM:  GENERAL: NAD, well-appearing  CHEST/LUNG: no obvious increased wob  ABDOMEN: Soft, Nontender, Nondistended;         LABS                10.9   3.09  )-----------( 179      ( 23 May 2019 05:26 )             33.8         05-23    142  |  102  |  17  ----------------------------<  112<H>  4.0   |  27  |  0.9          LFTs:             5.8  | 0.5  | 48       ------------------[622     ( 23 May 2019 05:26 )  3.2  | 0.3  | 90             Lactate, Blood: 0.7 mmol/L (05-22-19 @ 05:16)  Lactate, Blood: 1.6 mmol/L (05-21-19 @ 13:41)

## 2019-05-24 NOTE — MEDICAL STUDENT PROGRESS NOTE(EDUCATION) - NS MD HP STUD ASPLAN PLAN FT
Transaminitis likely secondary to choledocholithiasis  - Noted alkaline phosphatase of 677, ,  on admission. This morning Alk Phos 523, , AST 50   - Gamma-glutamyl transpeptidase 438, elevated GGT indicates elevated alk phos is likely hepatobiliary in origin (rather than bone)  - Disproportionate elevation in alk phos compared with AST, ALT + normal bilirubin levels indicates cholestatic pattern most likely due to choledocholithiasis  - Trend LFTs  - RUQ sono negative   - [facility] team following: plan for EUS with ERCP and possible sphincterotomy  - Low fat diet for now, NPO after midnight    Pancreatic cyst, rule out serous cystadenoma  - To be addressed with EUS and ERCP     COPD   - Continue with home inhalers (Symbicort 2 puffs BID) and montelukast 10mg PO daily    CAD s/p PCI in 2005 and 2007  - Continue on Aspirin 81mg PO daily, Imdur 30mg PO daily, and Metoprolol 50mg PO daily    Thyroid Cancer in remission  - Continue with Synthroid 137mcg    Suspected Vitamin D deficiency  - Continue on calcium carbonate and Vitamin D PO daily      Activity: As tolerated  Diet: Low Fat; NPO at midnight  DVT ppx: Lovenox 40mg SQ at bedtime  GI ppx: Protonix 40mg PO BID  Code Status: full code  DISPO: From home, pending GI procedure
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 This morning Alk Phos 622, ALT 90, AST 48 (previously alk phos 523, , AST 50)  - Gamma-glutamyl transpeptidase 438, elevated GGT indicates elevated alk phos is likely hepatobiliary in origin (rather than bone)  - Disproportionate elevation in alk phos compared with AST, ALT + normal bilirubin levels indicates cholestatic pattern  - Prior MRCP and RUQ ultrasound negative with no evidence of cholelithiasis, biliary ductal dilation, or evidence of cholelithiasis   - Patient fulfills [place] IV criteria for biliary pain (including epigastric pain that is not significantly related to bowel movements or significantly relieved by postural change or acid suppression, episodes lasting 30 minutes or longer, and recurrent symptoms occurring at different intervals). 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  - As per GI, sphincter of Oddi manometry is no longer recommended since the risk of developing pancreatitis leading to a likely sphincterotomy outweighs the benefit of confirming sphincter of Oddi dysfunction via manometry that would also lead to a sphincterotomy procedure  - [facility] team following: plan for EUS with ERCP and possible sphincterotomy today  - NPO since midnight, will hold Lovenox 40mg SQ and Aspirin 81mg prior to GI procedure  - Trend LFTs  - F/u CBC and CMP today    Pancreatic cyst, rule out serous cystadenoma  - To be addressed with EUS and ERCP   - Unlikely to be causing transaminitis due to small size (1.7cm) and location (body of pancreas as opposed to head)    COPD  - Continue with home inhalers (Symbicort 2 puffs BID) and montelukast 10mg PO daily    CAD s/p PCI in 2005 and 2007  - Continue on Aspirin 81mg PO daily (after procedure), Imdur 30mg PO daily, and Metoprolol 50mg PO daily    Thyroid Cancer in remission  - Continue with Synthroid 137mcg    Suspected Vitamin D deficiency  - Continue on calcium carbonate and Vitamin D PO daily      Activity: As tolerated  Diet: NPO since midnight  DVT ppx: Lovenox 40mg SQ at bedtime  GI ppx: Protonix 40mg PO BID  Code Status: full code  DISPO: From home, pending GI procedure today
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 This morning Alk Phos 622, ALT 90, AST 48 (previously alk phos 523, , AST 50)  - Gamma-glutamyl transpeptidase 438, elevated GGT indicates elevated alk phos is likely hepatobiliary in origin (rather than bone)  - Disproportionate elevation in alk phos compared with AST, ALT + normal bilirubin levels indicates cholestatic pattern  - Prior MRCP and RUQ ultrasound negative with no evidence of cholelithiasis, biliary ductal dilation, or evidence of cholelithiasis   - Patient fulfills [place] IV criteria for biliary pain (including epigastric pain that is not significantly related to bowel movements or significantly relieved by postural change or acid suppression, episodes lasting 30 minutes or longer, and recurrent symptoms occurring at different intervals). 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  - As per GI, sphincter of Oddi manometry is no longer recommended since the risk of developing pancreatitis leading to a likely sphincterotomy outweighs the benefit of confirming sphincter of Oddi dysfunction via manometry that would also lead to a sphincterotomy procedure  - [facility] team following: plan for EUS with ERCP and possible sphincterotomy today  - NPO since midnight  - Trend LFTs    Pancreatic cyst, rule out serous cystadenoma  - To be addressed with EUS and ERCP   - Unlikely to be causing transaminitis due to small size (1.7cm) and location (body of pancreas as opposed to head)    COPD  - Continue with home inhalers (Symbicort 2 puffs BID) and montelukast 10mg PO daily    CAD s/p PCI in 2005 and 2007  - Continue on Aspirin 81mg PO daily, Imdur 30mg PO daily, and Metoprolol 50mg PO daily    Thyroid Cancer in remission  - Continue with Synthroid 137mcg    Suspected Vitamin D deficiency  - Continue on calcium carbonate and Vitamin D PO daily      Activity: As tolerated  Diet: NPO since midnight  DVT ppx: Lovenox 40mg SQ at bedtime  GI ppx: Protonix 40mg PO BID  Code Status: full code  DISPO: From home, pending GI procedure today

## 2019-05-24 NOTE — PROGRESS NOTE ADULT - ASSESSMENT
Transaminitis     PLAN:    - ERCP with GI today     - trend LFT's     - surgery to follow Transaminitis     PLAN:    - ERCP with GI today     - trend LFT's     - once ercp is performed and enzymes are normalizing patient can follow up with Dr Lemon as outpatient with regards to discussion about cholecystectomy

## 2019-05-24 NOTE — MEDICAL STUDENT PROGRESS NOTE(EDUCATION) - SUBJECTIVE AND OBJECTIVE BOX
SUBJECTIVE:  Patient is a 78 year old male with a PMHx of lung cancer s/p lobectomy (in remission), thyroid cancer s/p thyroidectomy (in remission), COPD, CAD s/p PCI (2005, 2009), hypertension, dyslipidemia, obstructive sleep apnea, GERD, osteopenia, and hyperuricemia who presented with transaminitis (, , alkaline phosphatase 677) and was admitted for an ERCP.     Overnight, there were no acute events. Patient currently has no acute complaints. He reported no episodes of epigastric pain yesterday. He was seen and examined this morning and was resting comfortably and reported no pain currently. He denies chest pain, SOB, cough, abdominal pain, nausea, vomiting, diarrhea, constipation, changes in urination, and changes in bowel habits.     OBJECTIVE:  Vitals:  T (F): 96.3                       T (F) range: 96 - 97.1  BP: 141/67                      BP range: 127/63 - 141/67  HR: 71                                             RR: 18  SpO2: 97% on RA    Exam:  Gen: Patient is in no acute distress, awake, alert, pleasant and conversive  CV: Regular rate and rhythm, normal S1, S2, no murmurs, rubs, or gallops appreciated  Pulm: CTAB, no wheezes, rales, or rhonchi appreciated  Abd: Bowel sounds present, soft, mild tenderness to deep palpation in the epigastric region, non-distended, no rebound tenderness, negative Mims's sign, no hepatosplenomegaly, no CVA tenderness  Ext: No LE edema, distal pulses 2+, no calf tenderness  Skin: No jaundice  Neuro: Alert & Oriented x3, no focal deficits    Labs:    CBC (May 23 @ 05:26):                           Previous CBC (May 22 @ 05:16):                 10.9                                                      11.5  3.09 )------------( 179                               3.19 )------------( 159               33.8                                                      35.1    Coagulation (May 22 @ 05:16):   PT = 12.90 sec  INR = 1.12  PTT = 38.0 sec    CMP (May 23 @ 05:26):                         Previous CMP (May 22 @ 05:16):    142  |   102   |  17                                   139  |    100   |  15  ------------------------< 112                      ------------------------< 84        4.0   |     27   |   0.9                                 3.6   |    27    |   0.9      Calcium = 8.4                                        Calcium = 8.3   Magnesium = 1.7                             Total protein = 5.8                                   Albumin = 3.2  Total bilirubin = 0.6  Direct bilirubin = 0.3  AST = 48  ALT = 90  Alkaline phosphatase = 622    Gamma glutamyl transferase (May 22 @ 11:19): 438    Radiology:  None today

## 2019-05-24 NOTE — PRE-OP CHECKLIST - DNR CLARIFICATION FORM COMPLETED
Abscessed Tooth: Care Instructions  Your Care Instructions    An abscessed tooth is a tooth that has a pocket of pus in the tissues around it. Pus forms when the body tries to fight an infection caused by bacteria. If the pus cannot drain, it forms an abscess. An abscessed tooth can cause red, swollen gums and throbbing pain, especially when you chew. You may have a bad taste in your mouth and a fever, and your jaw may swell. Damage to the tooth, untreated tooth decay, or gum disease can cause an abscessed tooth. An abscessed tooth needs to be treated by a dental professional right away. If it is not treated, the infection could spread to other parts of your body. Your dentist will give you antibiotics to stop the infection. He or she may make a hole in the tooth or cut open ([place]) the abscess inside your mouth so that the infection can drain, which should relieve your pain. You may need to have a root canal treatment, which tries to save your tooth by taking out the infected pulp and replacing it with a healing medicine and/or a filling. If these treatments do not work, your tooth may have to be removed. Follow-up care is a key part of your treatment and safety. Be sure to make and go to all appointments, and call your doctor if you are having problems. It's also a good idea to know your test results and keep a list of the medicines you take. How can you care for yourself at home? · Reduce pain and swelling in your face and jaw by putting ice or a cold pack on the outside of your cheek for 10 to 20 minutes at a time. Put a thin cloth between the ice and your skin. · Take pain medicines exactly as directed. ¨ If the doctor gave you a prescription medicine for pain, take it as prescribed. ¨ If you are not taking a prescription pain medicine, ask your doctor if you can take an over-the-counter medicine. · Take your antibiotics as directed. Do not stop taking them just because you feel better.  You need to take the full course of antibiotics. To prevent tooth abscess  · Brush and floss every day, and have regular dental checkups. · Eat a healthy diet, and avoid sugary foods and drinks. · Do not smoke, use e-cigarettes with nicotine, or use spit tobacco. Tobacco and nicotine slow your ability to heal. Tobacco also increases your risk for gum disease and cancer of the mouth and throat. If you need help quitting, talk to your doctor about stop-smoking programs and medicines. These can increase your chances of quitting for good. When should you call for help? Call 911 anytime you think you may need emergency care. For example, call if:  · You have trouble breathing. Call your doctor now or seek immediate medical care if:  · You are dizzy or lightheaded, or you feel like you may faint. · You have a new or higher fever. · You have swelling, redness, or pain that spreads or gets worse. · You have pus coming from the tooth area. · You have an earache or pain behind your ear. · You have a fever with a stiff neck or a severe headache. · You are sensitive to light or feel very sleepy or confused. Watch closely for changes in your health, and be sure to contact your doctor if:  · You do not get better as expected. Where can you learn more? Go to http://joyce-hima.info/. Enter S461 in the search box to learn more about \"Abscessed Tooth: Care Instructions. \"  Current as of: September 19, 2016  Content Version: 11.2  © 3146-3850 [employer]. Care instructions adapted under license by [employer] (which disclaims liability or warranty for this information). If you have questions about a medical condition or this instruction, always ask your healthcare professional. [place] [address] any warranty or liability for your use of this information. n/a

## 2019-05-24 NOTE — PROGRESS NOTE ADULT - SUBJECTIVE AND OBJECTIVE BOX
Hospital Day:  3d    Subjective:    Patient is a 78y old  Male who presents with a chief complaint of Abnormal LFTs (23 May 2019 19:04)    No overnight events. Seen and examined at bedside. The patient reported no acute complaints. Reported no fevers, chills, nausea, vomiting, constipation, diarrhea. Reported no abdominal pain overnight; even after eating dinner. Spoke with [facility] this morning. He is scheduled today for 2PM procedure and [facility] is aware of the importance in getting this done.      Past Medical Hx:   Osteopenia  Hyperuricemia  Lung cancer  Coronary artery disease involving native heart, angina presence unspecified, unspecified vessel or lesion type  ALTAF (obstructive sleep apnea)  Chronic obstructive pulmonary disease, unspecified COPD type  Dyslipidemia  Thyroid cancer  Hypertension, unspecified type  Gastroesophageal reflux disease, esophagitis presence not specified  Malignant neoplasm of lung, unspecified laterality, unspecified part of lung    Past Sx:  H/O thyroidectomy  S/P lobectomy of lung    Allergies:  No Known Allergies    Current Meds:   Standng Meds:  buDESOnide 160 MICROgram(s)/formoterol 4.5 MICROgram(s) Inhaler 2 Puff(s) Inhalation at bedtime  calcium carbonate 1250 mG  + Vitamin D (OsCal 500 + D) 1 Tablet(s) Oral daily  chlorhexidine 4% Liquid 1 Application(s) Topical <User Schedule>  enoxaparin Injectable 40 milliGRAM(s) SubCutaneous at bedtime  isosorbide   mononitrate ER Tablet (IMDUR) 30 milliGRAM(s) Oral daily  levothyroxine 137 MICROGram(s) Oral daily  magnesium oxide 400 milliGRAM(s) Oral two times a day with meals  metoprolol succinate ER 50 milliGRAM(s) Oral daily  montelukast 10 milliGRAM(s) Oral daily  pantoprazole    Tablet 40 milliGRAM(s) Oral every 12 hours  valsartan 160 milliGRAM(s) Oral daily    PRN Meds:    HOME MEDICATIONS:  allopurinol 100 mg oral tablet: 1 tab(s) orally 2 times a day  aspirin 81 mg oral tablet, chewable: 1 tab(s) orally once a day  atorvastatin 10 mg oral tablet: 1 tab(s) orally once a day (at bedtime)  Breo Ellipta 100 mcg-25 mcg/inh inhalation powder: 1 puff(s) inhaled once a day  Calcium 600+D oral tablet: 1 tab(s) orally 2 times a day  isosorbide mononitrate 30 mg oral tablet, extended release: 1 tab(s) orally once a day (in the morning)  Metoprolol Succinate ER 50 mg oral tablet, extended release: 1 tab(s) orally once a day  montelukast 10 mg oral tablet: 1 tab(s) orally once a day  Protonix 40 mg oral delayed release tablet: 40 milligram(s) orally 2 times a day  Synthroid 137 mcg (0.137 mg) oral tablet: 1 tab(s) orally 3 times a week  Synthroid 150 mcg (0.15 mg) oral tablet: 1 tab(s) orally 4 times a week  valsartan 160 mg oral capsule: 1 tab(s) orally once a day      Vital Signs:   T(F): 96.3 (05-24-19 @ 04:59), Max: 97.1 (05-23-19 @ 11:04)  HR: 71 (05-24-19 @ 04:59) (67 - 95)  BP: 141/67 (05-24-19 @ 04:59) (127/63 - 141/67)  RR: 18 (05-24-19 @ 04:59) (18 - 18)  SpO2: 96% (05-24-19 @ 07:25) (95% - 97%)    Physical Exam:   GENERAL: NAD, Lying in bed, pleasant and conversive appearing stated age  HEENT: NCAT, PERRLA, EOMI. Neck noted for indentation on anterior lower neck from prior surgery  CHEST/LUNG: CTA, No wheezing, rhonchi, rales  HEART: Regular rate and rhythm; s1 s2 appreciated, No murmurs, rubs, or gallops  ABDOMEN: Soft, Mild tenderness to deep palpation on RUQ and LLQ, Nondistended; Bowel sounds present, No rebound tenderness, Mims negative, no hepatomegaly felt   EXTREMITIES: No LE edema b/l, Peripheral pulses 2+, No cyanosis, No clubbing  NERVOUS SYSTEM:  Alert & Oriented X3, non focal    Labs:                         10.9   3.09  )-----------( 179      ( 23 May 2019 05:26 )             33.8       23 May 2019 05:26    142    |  102    |  17     ----------------------------<  112    4.0     |  27     |  0.9      Ca    8.4        23 May 2019 05:26  Mg     1.7       23 May 2019 05:26    TPro  5.8    /  Alb  3.2    /  TBili  0.5    /  DBili  0.3    /  AST  48     /  ALT  90     /  AlkPhos  622    23 May 2019 05:26    Amylase --, Lipase 30, 05-21-19 @ 13:41    Troponin <0.01, CKMB --, CK 86 05-21-19 @ 13:41    Radiology:   None Today    Assessment and Plan:   This is a 78 year old male with a PMHx of Lung cancer s/p lobectomy (noted in remission), thyroid cancer (noted in remission), history of COPD, CAD s/p PCI (2005, 2007), Hypertension, Dyslipidemia, with recent admission and discharge for epigastric pain who presented with elevated labs    #Transaminitis likely secondary to choledocholithiasis with an 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 This morning AST 48, ALT 90, Alk Phos 622  - GGT >400  - RUQ sono negative   - [facility] team following; plan for EUS with ERCP and possible sphincterotomy at 2PM  - Low fat diet for now, NPO after midnight  - Resume atorvastatin and allopurinol after procedure     #Cystic Structure in Pancreas  - CA 19-9: 8  - Unlikely to be cancerous and the cause of LFT elevation  - Plan to be investigated further with [facility] procedure     #COPD   - Continue with home inhalers (Symbicort) and montelukast    #CAD s/p PCI in 2005 and 2007  - Continue on Aspirin, Imdur and Metoprolol     #Thyroid Cancer in remission  - Continue with Synthroid 137mcg    #Suspected Vitamin D deficiency  - Continue on calcium carbonate and Vitamin D    #Hyperkalemia (hemolyzed)  - Continue on Diovan 160mg     Activity: As tolerated  Diet: NPO for procedure and then Low Fat diet   DVT ppx: Lovenox  GI ppx: Protonix  Code Status: full code  DISPO: From home, pending GI procedure today. Anticipate discharge in the AM Hospital Day:  3d    Subjective:    Patient is a 78y old  Male who presents with a chief complaint of Abnormal LFTs (23 May 2019 19:04)    No overnight events. Seen and examined at bedside. The patient reported no acute complaints. Reported no fevers, chills, nausea, vomiting, constipation, diarrhea. Reported no abdominal pain overnight; even after eating dinner. Spoke with advanced GI this morning. He is scheduled today for 2PM procedure and [facility] is aware of the importance in getting this done.      Past Medical Hx:   Osteopenia  Hyperuricemia  Lung cancer  Coronary artery disease involving native heart, angina presence unspecified, unspecified vessel or lesion type  ALTAF (obstructive sleep apnea)  Chronic obstructive pulmonary disease, unspecified COPD type  Dyslipidemia  Thyroid cancer  Hypertension, unspecified type  Gastroesophageal reflux disease, esophagitis presence not specified  Malignant neoplasm of lung, unspecified laterality, unspecified part of lung    Past Sx:  H/O thyroidectomy  S/P lobectomy of lung    Allergies:  No Known Allergies    Current Meds:   Standng Meds:  buDESOnide 160 MICROgram(s)/formoterol 4.5 MICROgram(s) Inhaler 2 Puff(s) Inhalation at bedtime  calcium carbonate 1250 mG  + Vitamin D (OsCal 500 + D) 1 Tablet(s) Oral daily  chlorhexidine 4% Liquid 1 Application(s) Topical <User Schedule>  enoxaparin Injectable 40 milliGRAM(s) SubCutaneous at bedtime  isosorbide   mononitrate ER Tablet (IMDUR) 30 milliGRAM(s) Oral daily  levothyroxine 137 MICROGram(s) Oral daily  magnesium oxide 400 milliGRAM(s) Oral two times a day with meals  metoprolol succinate ER 50 milliGRAM(s) Oral daily  montelukast 10 milliGRAM(s) Oral daily  pantoprazole    Tablet 40 milliGRAM(s) Oral every 12 hours  valsartan 160 milliGRAM(s) Oral daily    PRN Meds:    HOME MEDICATIONS:  allopurinol 100 mg oral tablet: 1 tab(s) orally 2 times a day  aspirin 81 mg oral tablet, chewable: 1 tab(s) orally once a day  atorvastatin 10 mg oral tablet: 1 tab(s) orally once a day (at bedtime)  Breo Ellipta 100 mcg-25 mcg/inh inhalation powder: 1 puff(s) inhaled once a day  Calcium 600+D oral tablet: 1 tab(s) orally 2 times a day  isosorbide mononitrate 30 mg oral tablet, extended release: 1 tab(s) orally once a day (in the morning)  Metoprolol Succinate ER 50 mg oral tablet, extended release: 1 tab(s) orally once a day  montelukast 10 mg oral tablet: 1 tab(s) orally once a day  Protonix 40 mg oral delayed release tablet: 40 milligram(s) orally 2 times a day  Synthroid 137 mcg (0.137 mg) oral tablet: 1 tab(s) orally 3 times a week  Synthroid 150 mcg (0.15 mg) oral tablet: 1 tab(s) orally 4 times a week  valsartan 160 mg oral capsule: 1 tab(s) orally once a day      Vital Signs:   T(F): 96.3 (05-24-19 @ 04:59), Max: 97.1 (05-23-19 @ 11:04)  HR: 71 (05-24-19 @ 04:59) (67 - 95)  BP: 141/67 (05-24-19 @ 04:59) (127/63 - 141/67)  RR: 18 (05-24-19 @ 04:59) (18 - 18)  SpO2: 96% (05-24-19 @ 07:25) (95% - 97%)    Physical Exam:   GENERAL: NAD, Lying in bed, pleasant and conversive appearing stated age  HEENT: NCAT, PERRLA, EOMI. Neck noted for indentation on anterior lower neck from prior surgery  CHEST/LUNG: CTA, No wheezing, rhonchi, rales  HEART: Regular rate and rhythm; s1 s2 appreciated, No murmurs, rubs, or gallops  ABDOMEN: Soft, Mild tenderness to deep palpation on RUQ and LLQ, Nondistended; Bowel sounds present, No rebound tenderness, Mims negative, no hepatomegaly felt   EXTREMITIES: No LE edema b/l, Peripheral pulses 2+, No cyanosis, No clubbing  NERVOUS SYSTEM:  Alert & Oriented X3, non focal    Labs:                         10.9   3.09  )-----------( 179      ( 23 May 2019 05:26 )             33.8       23 May 2019 05:26    142    |  102    |  17     ----------------------------<  112    4.0     |  27     |  0.9      Ca    8.4        23 May 2019 05:26  Mg     1.7       23 May 2019 05:26    TPro  5.8    /  Alb  3.2    /  TBili  0.5    /  DBili  0.3    /  AST  48     /  ALT  90     /  AlkPhos  622    23 May 2019 05:26    Amylase --, Lipase 30, 05-21-19 @ 13:41    Troponin <0.01, CKMB --, CK 86 05-21-19 @ 13:41    Radiology:   None Today    Assessment and Plan:   This is a 78 year old male with a PMHx of Lung cancer s/p lobectomy (noted in remission), thyroid cancer (noted in remission), history of COPD, CAD s/p PCI (2005, 2007), Hypertension, Dyslipidemia, with recent admission and discharge for epigastric pain who presented with elevated labs    #Transaminitis likely secondary to choledocholithiasis with an 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 This morning AST 48, ALT 90, Alk Phos 622  - GGT >400  - RUQ sono negative   - [facility] team following; plan for EUS with ERCP and possible sphincterotomy at 2PM  - NPO for procedure  - Plan to resume atorvastatin and allopurinol after tomorrow  - Outpatient surgical follow up for possible Gallbladder removal     #Cystic Structure in Pancreas  - CA 19-9: 8  - Unlikely to be cancerous and the cause of LFT elevation  - Plan to be investigated further with [facility] procedure     #COPD   - Continue with home inhalers (Symbicort) and montelukast    #CAD s/p PCI in 2005 and 2007  - Continue on Aspirin, Imdur and Metoprolol     #Thyroid Cancer in remission  - Continue with Synthroid 137mcg    #Suspected Vitamin D deficiency  - Continue on calcium carbonate and Vitamin D    #Hyperkalemia (hemolyzed)  - Continue on Diovan 160mg     Activity: As tolerated  Diet: NPO for procedure and then Low Fat diet   DVT ppx: Lovenox  GI ppx: Protonix  Code Status: full code  DISPO: From home, pending GI procedure today. Anticipate discharge in the AM

## 2019-05-24 NOTE — PROGRESS NOTE ADULT - ASSESSMENT
79 yo M PMH of Lung ca s/p lobectomy (in remission), Thyroid cancer (in remission), hx of COPD, CAD s/p PCI (2005, 2007), HTN, DLD present for abnormal labwork at Dr. Lemon's office.    # Abnormal LFT's   ALK PHOS: 677 U/L / ALT: 158 U/L / AST: 112 U/L / GGT also high - these are newly abnl w/in past mo (spoke w/ PMD)  RUQ sono: no GB stones; no CBD dilation  LFT q24 after ERCP  f/u GI - ERCP today; NPO; IVFs  no manometric testing advised - causes too much pancreatitis  plan is for ERCP w/ sphincterotomy (? stent) and EUS of Panc cyst  will eventually need GB rxn once recovered (not on this admission)  hold allopurinol and statin until LFTs better (once AST and ALT < 100) - prob resume after ERCP    # cystic structure in pancrease on MRI is NOT the cause of pain or the abnl LFTs  pt will prob have EUS to further evaluate this  CA 19-9 = 8    # COPD  c/w home inhalers    # HTN  cont BB and ARB    # CAD s/p PCI (2005, 2007)  c/w home meds (ASA and statin)    # Thyroid cancer -in remission  c/w home med (synthroid)    # Lung cancer, s/p rxn - in remission    # DVT ppx: LMWH (on hold for ERCP)    # GI ppx: no needed    # Code Status -full code    # Dispo: f/u GI for ERCP/EUS; prob home tomorrow

## 2019-05-24 NOTE — PROGRESS NOTE ADULT - ATTENDING COMMENTS
Assessment and plan above were modified and discussed with residents, physician assistants, and nurses.
Assessment and plan above were modified and discussed with residents, physician assistants, and nurses.

## 2019-05-24 NOTE — MEDICAL STUDENT PROGRESS NOTE(EDUCATION) - NS MD HP STUD ASPLAN ASSES FT
Patient is a 78 year old male with a PMHx of lung cancer s/p lobectomy (in remission), thyroid cancer s/p thyroidectomy (in remission), COPD, CAD s/p PCI (2005, 2009), hypertension, dyslipidemia, obstructive sleep apnea, GERD, osteopenia, and hyperuricemia who presented for a recent admission and discharge to [facility] (05/05 - 05/09) with intermittent epigastric pain, nausea, and vomiting to rule out ascending cholangitis and has been following up as an outpatient with GI and surgery (Dr. Lemon) to schedule an elective cholecystectomy. While at Dr. Lemon's office, patient was found to have a transaminitis (, , alkaline phosphatase 677) and was admitted for an ERCP.
Patient is a 78 year old male with a PMHx of lung cancer s/p lobectomy (in remission), thyroid cancer s/p thyroidectomy (in remission), COPD, CAD s/p PCI (2005, 2009), hypertension, dyslipidemia, obstructive sleep apnea, GERD, osteopenia, and hyperuricemia who presented for a recent admission and discharge to [facility] (05/05 - 05/09) with intermittent epigastric pain, nausea, and vomiting to rule out ascending cholangitis and has been following up as an outpatient with GI and surgery (Dr. Lemon) to schedule an elective cholecystectomy. While at Dr. Lemon's office, patient was found to have a transaminitis (, , alkaline phosphatase 677) and was admitted for an ERCP.
Patient is a 78 year old male with a PMHx of lung cancer s/p lobectomy (in remission), thyroid cancer s/p thyroidectomy (in remission), COPD, CAD s/p PCI (2005, 2009), hypertension, dyslipidemia, obstructive sleep apnea, GERD, osteopenia, and hyperuricemia who presented for a recent admission and discharge to [facility] (05/05 - 05/09) with intermittent epigastric pain, nausea, and vomiting to rule out ascending cholangitis and has been following up as an outpatient with GI and surgery (Dr. Lemon) to schedule an elective cholecystectomy. While at Dr. Lemon's office, patient was found to have a transaminitis (, , alkaline phosphatase 677) and was admitted for an ERCP.

## 2019-05-24 NOTE — PROGRESS NOTE ADULT - SUBJECTIVE AND OBJECTIVE BOX
JENNIFER HERIBERTO  78y  Male  ***My note supersedes ALL resident notes that I sign.  My corrections for their notes are in my note.***    I can be reached directly on [id number]. My office number is [phone]. My personal cell number is [phone].    INTERVAL EVENTS: Here for f/u of abd pain. No further pain. Awaits ERCP. Walking fine.     T(F): 96.3 (05-24-19 @ 04:59), Max: 96.7 (05-23-19 @ 20:45)  HR: 71 (05-24-19 @ 04:59) (67 - 95)  BP: 141/67 (05-24-19 @ 04:59) (127/63 - 141/67)  RR: 18 (05-24-19 @ 04:59) (18 - 18)  SpO2: 96% (05-24-19 @ 07:25) (95% - 97%)    Gen- No acute distress  Cardio- normal S1, S2 rrr   Pulm- CTA B/L  Abd- nontender, nondistended no HS megaly  Ext- no cyanosis or edema  Neuro- AOx4, nonfocal  Psych- cooperative, appropriate    LABS:                        10.9    (    84.5   3.09  )-----------( ---------      179      ( 23 May 2019 05:26 )             33.8    (    14.6     RADIOLOGY & ADDITIONAL TESTS:      MEDICATIONS:    buDESOnide 160 MICROgram(s)/formoterol 4.5 MICROgram(s) Inhaler 2 Puff(s) Inhalation at bedtime  calcium carbonate 1250 mG  + Vitamin D (OsCal 500 + D) 1 Tablet(s) Oral daily  chlorhexidine 4% Liquid 1 Application(s) Topical <User Schedule>  enoxaparin Injectable 40 milliGRAM(s) SubCutaneous at bedtime  isosorbide   mononitrate ER Tablet (IMDUR) 30 milliGRAM(s) Oral daily  levothyroxine 137 MICROGram(s) Oral daily  magnesium oxide 400 milliGRAM(s) Oral two times a day with meals  metoprolol succinate ER 50 milliGRAM(s) Oral daily  montelukast 10 milliGRAM(s) Oral daily  pantoprazole    Tablet 40 milliGRAM(s) Oral every 12 hours  valsartan 160 milliGRAM(s) Oral daily

## 2019-05-24 NOTE — CHART NOTE - NSCHARTNOTEFT_GEN_A_CORE
PACU ANESTHESIA ADMISSION NOTE      Procedure: ERCP      ____  Intubated  TV:______       Rate: ______      FiO2: ______    _x___  Patent Airway    _x___  Full return of protective reflexes    _x___  Full recovery from anesthesia / back to baseline status    Vitals:  T(C): 36.5  HR: 81  BP: 160/69  RR: 18  SpO2: 99%    Mental Status:  _x___ Awake   _____ Alert   _____ Drowsy   _____ Sedated    Nausea/Vomiting:  _x___  NO       ______Yes,   See Post - Op Orders         Pain Scale (0-10):  __0___    Treatment: _x___ None    ____ See Post - Op/PCA Orders    Post - Operative Fluids:   ____ Oral   _x___ See Post - Op Orders    Plan: Discharge:   ___Home       _x____Floor     _____Critical Care    _____  Other:_________________    Comments:  No anesthesia issues or complications noted.  Discharge when criteria met.

## 2019-05-25 ENCOUNTER — TRANSCRIPTION ENCOUNTER (OUTPATIENT)
Age: 79
End: 2019-05-25

## 2019-05-25 VITALS — OXYGEN SATURATION: 97 %

## 2019-05-25 LAB
ALBUMIN SERPL ELPH-MCNC: 3.5 G/DL — SIGNIFICANT CHANGE UP (ref 3.5–5.2)
ALP SERPL-CCNC: 454 U/L — HIGH (ref 30–115)
ALT FLD-CCNC: 58 U/L — HIGH (ref 0–41)
ANION GAP SERPL CALC-SCNC: 14 MMOL/L — SIGNIFICANT CHANGE UP (ref 7–14)
AST SERPL-CCNC: 27 U/L — SIGNIFICANT CHANGE UP (ref 0–41)
BASOPHILS # BLD AUTO: 0.02 K/UL — SIGNIFICANT CHANGE UP (ref 0–0.2)
BASOPHILS NFR BLD AUTO: 0.6 % — SIGNIFICANT CHANGE UP (ref 0–1)
BILIRUB DIRECT SERPL-MCNC: 0.3 MG/DL — HIGH (ref 0–0.2)
BILIRUB INDIRECT FLD-MCNC: 0.4 MG/DL — SIGNIFICANT CHANGE UP (ref 0.2–1.2)
BILIRUB SERPL-MCNC: 0.7 MG/DL — SIGNIFICANT CHANGE UP (ref 0.2–1.2)
BUN SERPL-MCNC: 13 MG/DL — SIGNIFICANT CHANGE UP (ref 10–20)
CALCIUM SERPL-MCNC: 8.7 MG/DL — SIGNIFICANT CHANGE UP (ref 8.5–10.1)
CHLORIDE SERPL-SCNC: 101 MMOL/L — SIGNIFICANT CHANGE UP (ref 98–110)
CO2 SERPL-SCNC: 27 MMOL/L — SIGNIFICANT CHANGE UP (ref 17–32)
CREAT SERPL-MCNC: 0.9 MG/DL — SIGNIFICANT CHANGE UP (ref 0.7–1.5)
EOSINOPHIL # BLD AUTO: 0.13 K/UL — SIGNIFICANT CHANGE UP (ref 0–0.7)
EOSINOPHIL NFR BLD AUTO: 4.1 % — SIGNIFICANT CHANGE UP (ref 0–8)
GLUCOSE SERPL-MCNC: 88 MG/DL — SIGNIFICANT CHANGE UP (ref 70–99)
HCT VFR BLD CALC: 33.9 % — LOW (ref 42–52)
HGB BLD-MCNC: 11.1 G/DL — LOW (ref 14–18)
IMM GRANULOCYTES NFR BLD AUTO: 0.6 % — HIGH (ref 0.1–0.3)
LYMPHOCYTES # BLD AUTO: 0.89 K/UL — LOW (ref 1.2–3.4)
LYMPHOCYTES # BLD AUTO: 28.2 % — SIGNIFICANT CHANGE UP (ref 20.5–51.1)
MAGNESIUM SERPL-MCNC: 1.9 MG/DL — SIGNIFICANT CHANGE UP (ref 1.8–2.4)
MCHC RBC-ENTMCNC: 28.2 PG — SIGNIFICANT CHANGE UP (ref 27–31)
MCHC RBC-ENTMCNC: 32.7 G/DL — SIGNIFICANT CHANGE UP (ref 32–37)
MCV RBC AUTO: 86.3 FL — SIGNIFICANT CHANGE UP (ref 80–94)
MONOCYTES # BLD AUTO: 0.39 K/UL — SIGNIFICANT CHANGE UP (ref 0.1–0.6)
MONOCYTES NFR BLD AUTO: 12.3 % — HIGH (ref 1.7–9.3)
NEUTROPHILS # BLD AUTO: 1.71 K/UL — SIGNIFICANT CHANGE UP (ref 1.4–6.5)
NEUTROPHILS NFR BLD AUTO: 54.2 % — SIGNIFICANT CHANGE UP (ref 42.2–75.2)
NRBC # BLD: 0 /100 WBCS — SIGNIFICANT CHANGE UP (ref 0–0)
PLATELET # BLD AUTO: 162 K/UL — SIGNIFICANT CHANGE UP (ref 130–400)
POTASSIUM SERPL-MCNC: 4.2 MMOL/L — SIGNIFICANT CHANGE UP (ref 3.5–5)
POTASSIUM SERPL-SCNC: 4.2 MMOL/L — SIGNIFICANT CHANGE UP (ref 3.5–5)
PROT SERPL-MCNC: 5.9 G/DL — LOW (ref 6–8)
RBC # BLD: 3.93 M/UL — LOW (ref 4.7–6.1)
RBC # FLD: 14.6 % — HIGH (ref 11.5–14.5)
SODIUM SERPL-SCNC: 142 MMOL/L — SIGNIFICANT CHANGE UP (ref 135–146)
WBC # BLD: 3.16 K/UL — LOW (ref 4.8–10.8)
WBC # FLD AUTO: 3.16 K/UL — LOW (ref 4.8–10.8)

## 2019-05-25 RX ADMIN — Medication 137 MICROGRAM(S): at 05:03

## 2019-05-25 RX ADMIN — PANTOPRAZOLE SODIUM 40 MILLIGRAM(S): 20 TABLET, DELAYED RELEASE ORAL at 05:03

## 2019-05-25 RX ADMIN — VALSARTAN 160 MILLIGRAM(S): 80 TABLET ORAL at 05:21

## 2019-05-25 RX ADMIN — Medication 50 MILLIGRAM(S): at 05:03

## 2019-05-25 RX ADMIN — MAGNESIUM OXIDE 400 MG ORAL TABLET 400 MILLIGRAM(S): 241.3 TABLET ORAL at 08:36

## 2019-05-25 NOTE — PROGRESS NOTE ADULT - PROVIDER SPECIALTY LIST ADULT
Gastroenterology
Gastroenterology
Internal Medicine
Surgery
Internal Medicine

## 2019-05-25 NOTE — PROGRESS NOTE ADULT - SUBJECTIVE AND OBJECTIVE BOX
HERIBERTO RODRIGUEZ  78y  Male  ***My note supersedes ALL resident notes that I sign.  My corrections for their notes are in my note.***    I can be reached directly on [id number]. My office number is [phone]. My personal cell number is [phone].    INTERVAL EVENTS: Here for f/u of abd pain. s/p ercp. did well overnight. no pain. eating and drinking well.    T(F): 96.6 (05-25-19 @ 05:30), Max: 97.7 (05-24-19 @ 13:53)  HR: 68 (05-25-19 @ 05:30) (55 - 108)  BP: 149/70 (05-25-19 @ 05:30) (120/72 - 160/69)  RR: 18 (05-25-19 @ 05:30) (18 - 19)  SpO2: 99% (05-25-19 @ 00:44) (95% - 99%)    Gen- No acute distress  Cardio- normal S1, S2 rrr   Pulm- CTA B/L  Abd- nontender, nondistended no HS megaly  Ext- no cyanosis or edema  Neuro- AOx4, nonfocal  Psych- cooperative, appropriate    LABS:                        11.1    (    86.3   3.16  )-----------( ---------      162      ( 25 May 2019 05:12 )             33.9    (    14.6     Hemoglobin: 11.1 g/dL (05-25 @ 05:12)  Hemoglobin: 11.2 g/dL (05-24 @ 21:33)  Hemoglobin: 10.9 g/dL (05-23 @ 05:26)  Hemoglobin: 11.5 g/dL (05-22 @ 05:16)  Hemoglobin: 12.2 g/dL (05-21 @ 13:41)    142   (   101   (   88      05-25-19 @ 05:12  ----------------------               4.2   (   27   (   13                             -----                        0.9  Ca  8.7   Mg  1.9    P   --     142   (   101   (   110      05-24-19 @ 21:33  ----------------------               3.9   (   28   (   14                             -----                        0.9  Ca  8.7   Mg  1.6    P   3.5     LFT  5.9  (  0.7  (  27       05-25-19 @ 05:12  -------------------------  3.5  (  454  (  58    T melvin 0.7     AST 27  ALT 58  05-25-19 @ 05:12  T melvin 0.7     AST 30  ALT 63  05-24-19 @ 21:33  T melvin 0.5     AST 48  ALT 90  05-23-19 @ 05:26  T melvin 0.6     AST 50    05-22-19 @ 05:16  T melvin 0.7     AST 70    05-21-19 @ 16:19    RADIOLOGY & ADDITIONAL TESTS:  < from: ERCP (05.24.19 @ 13:45) >  Impressions:    Successful ERCP with sphincterotomy, and stone extraction.    Contorl of sphincterotomy oozing with balloon tamponade and epinephrine  injection.     Plan: Monitor for fever, hypotension and pain   If none of the above symptoms are experienced start clears  Trend LFTs and CBC Q12 (monitor for drop in HGb)  Advance diet tomorrow  If diet is tolerated might DC to follow up with surgery for lap dee    < end of copied text >      MEDICATIONS:    allopurinol 100 milliGRAM(s) Oral two times a day  aspirin  chewable 81 milliGRAM(s) Oral daily  atorvastatin 10 milliGRAM(s) Oral at bedtime  buDESOnide 160 MICROgram(s)/formoterol 4.5 MICROgram(s) Inhaler 2 Puff(s) Inhalation at bedtime  calcium carbonate 1250 mG  + Vitamin D (OsCal 500 + D) 1 Tablet(s) Oral daily  chlorhexidine 4% Liquid 1 Application(s) Topical <User Schedule>  enoxaparin Injectable 40 milliGRAM(s) SubCutaneous at bedtime  isosorbide   mononitrate ER Tablet (IMDUR) 30 milliGRAM(s) Oral daily  levothyroxine 137 MICROGram(s) Oral daily  magnesium oxide 400 milliGRAM(s) Oral two times a day with meals  metoprolol succinate ER 50 milliGRAM(s) Oral daily  montelukast 10 milliGRAM(s) Oral daily  pantoprazole    Tablet 40 milliGRAM(s) Oral every 12 hours  valsartan 160 milliGRAM(s) Oral daily

## 2019-05-25 NOTE — PROGRESS NOTE ADULT - ASSESSMENT
Assessment:  78y Male patient admitted S/P ERCP for CBD obstruction    Plan:  S/p ERCP  Trend LFTs  Once enzymes are normalizing patient can follow up with Dr Lemon as outpatient with regards to discussion about cholecystectomy

## 2019-05-25 NOTE — PROGRESS NOTE ADULT - ASSESSMENT
77 yo M PMH of Lung ca s/p lobectomy (in remission), Thyroid cancer (in remission), hx of COPD, CAD s/p PCI (2005, 2007), HTN, DLD present for abnormal labwork at Dr. Lemon's office.    # Abnormal LFT's - improving after ERCP w/ sphincterotomy and stone extraction  RUQ sono: no GB stones; no CBD dilation  f/u GI - as outpt  will eventually need GB rxn once recovered (not on this admission)  restart allopurinol and statin     # cystic structure in pancrease on MRI is NOT the cause of pain or the abnl LFTs  EUS was not done  CA 19-9 = 8  f/u w/ GI as outpt    # COPD  c/w home inhalers    # HTN  cont BB and ARB    # CAD s/p PCI (2005, 2007)  c/w home meds (ASA and statin)    # Thyroid cancer -in remission  c/w home med (synthroid)    # Lung cancer, s/p rxn - in remission    # DVT ppx: LMWH (on hold for ERCP)    # GI ppx: no needed    # Code Status -full code    # Dispo: d/c home today; outpt GB rxn w/ Dr Lemon

## 2019-05-25 NOTE — DISCHARGE NOTE NURSING/CASE MANAGEMENT/SOCIAL WORK - NSDCDPATPORTLINK_GEN_ALL_CORE
You can access the [facility] Portal, offered by [facility], by registering with the following website: [email or web address]

## 2019-05-25 NOTE — PROGRESS NOTE ADULT - SUBJECTIVE AND OBJECTIVE BOX
Patient: HERIBERTO RODRIGUEZ , 78y (1940)Male   MRN: [id number]  Location: [facility] [id number]  Visit: 05-21-19 Inpatient  Date: 05-25-19 @ 02:43    Procedure/Diagnosis: CBD obstruction  Events over 24h: No acute overnight events, patient s/p ERCP     Vitals: T(F): 96.1 (05-24-19 @ 21:09), Max: 97.7 (05-24-19 @ 13:53)  HR: 69 (05-24-19 @ 21:09)  BP: 120/72 (05-24-19 @ 21:09) (120/72 - 160/69)  RR: 18 (05-24-19 @ 21:09)  SpO2: 99% (05-25-19 @ 00:44)    Diet: Diet, Clear Liquid:   No Red Dye (05-24-19 @ 16:29)    IV Fluids: no , Type: calcium carbonate 1250 mG  + Vitamin D (OsCal 500 + D) 1 Tablet(s) Oral daily  magnesium oxide 400 milliGRAM(s) Oral two times a day with meals    Physical Examination:  General Appearance: NAD, alert and cooperative  HEENT: NCAT, WNL  Heart: S1 and S2. No murmurs. RRR  Lungs: Clear to auscultation BL without rales, rhonchi, wheezing, crackles or diminished breath sounds.  Abdomen: Soft, nondistended    Medications: [Standing]  allopurinol 100 milliGRAM(s) Oral two times a day  aspirin  chewable 81 milliGRAM(s) Oral daily  atorvastatin 10 milliGRAM(s) Oral at bedtime  buDESOnide 160 MICROgram(s)/formoterol 4.5 MICROgram(s) Inhaler 2 Puff(s) Inhalation at bedtime  calcium carbonate 1250 mG  + Vitamin D (OsCal 500 + D) 1 Tablet(s) Oral daily  chlorhexidine 4% Liquid 1 Application(s) Topical <User Schedule>  enoxaparin Injectable 40 milliGRAM(s) SubCutaneous at bedtime  isosorbide   mononitrate ER Tablet (IMDUR) 30 milliGRAM(s) Oral daily  levothyroxine 137 MICROGram(s) Oral daily  magnesium oxide 400 milliGRAM(s) Oral two times a day with meals  metoprolol succinate ER 50 milliGRAM(s) Oral daily  montelukast 10 milliGRAM(s) Oral daily  pantoprazole    Tablet 40 milliGRAM(s) Oral every 12 hours  valsartan 160 milliGRAM(s) Oral daily    DVT Prophylaxis: enoxaparin Injectable 40 milliGRAM(s) SubCutaneous at bedtime  GI Prophylaxis: pantoprazole    Tablet 40 milliGRAM(s) Oral every 12 hours    Antibiotics:   Anticoagulation:   Medications:[PRN]    Labs:                        11.2   3.66  )-----------( 158      ( 24 May 2019 21:33 )             34.2     05-24    142  |  101  |  14  ----------------------------<  110<H>  3.9   |  28  |  0.9    Ca    8.7      24 May 2019 21:33  Phos  3.5     05-24  Mg     1.6     05-24    TPro  5.9<L>  /  Alb  3.5  /  TBili  0.7  /  DBili  0.4<H>  /  AST  30  /  ALT  63<H>  /  AlkPhos  471<H>  05-24    LIVER FUNCTIONS - ( 24 May 2019 21:33 )  Alb: 3.5 g/dL / Pro: 5.9 g/dL / ALK PHOS: 471 U/L / ALT: 63 U/L / AST: 30 U/L / GGT: x

## 2019-05-30 DIAGNOSIS — I25.10 ATHEROSCLEROTIC HEART DISEASE OF NATIVE CORONARY ARTERY WITHOUT ANGINA PECTORIS: ICD-10-CM

## 2019-05-30 DIAGNOSIS — K80.51 CALCULUS OF BILE DUCT WITHOUT CHOLANGITIS OR CHOLECYSTITIS WITH OBSTRUCTION: ICD-10-CM

## 2019-05-30 DIAGNOSIS — E87.5 HYPERKALEMIA: ICD-10-CM

## 2019-05-30 DIAGNOSIS — K83.8 OTHER SPECIFIED DISEASES OF BILIARY TRACT: ICD-10-CM

## 2019-05-30 DIAGNOSIS — Z82.49 FAMILY HISTORY OF ISCHEMIC HEART DISEASE AND OTHER DISEASES OF THE CIRCULATORY SYSTEM: ICD-10-CM

## 2019-05-30 DIAGNOSIS — Z79.82 LONG TERM (CURRENT) USE OF ASPIRIN: ICD-10-CM

## 2019-05-30 DIAGNOSIS — I10 ESSENTIAL (PRIMARY) HYPERTENSION: ICD-10-CM

## 2019-05-30 DIAGNOSIS — K86.2 CYST OF PANCREAS: ICD-10-CM

## 2019-05-30 DIAGNOSIS — R94.5 ABNORMAL RESULTS OF LIVER FUNCTION STUDIES: ICD-10-CM

## 2019-05-30 DIAGNOSIS — Z85.850 PERSONAL HISTORY OF MALIGNANT NEOPLASM OF THYROID: ICD-10-CM

## 2019-05-30 DIAGNOSIS — M81.0 AGE-RELATED OSTEOPOROSIS WITHOUT CURRENT PATHOLOGICAL FRACTURE: ICD-10-CM

## 2019-05-30 DIAGNOSIS — K21.9 GASTRO-ESOPHAGEAL REFLUX DISEASE WITHOUT ESOPHAGITIS: ICD-10-CM

## 2019-05-30 DIAGNOSIS — E89.0 POSTPROCEDURAL HYPOTHYROIDISM: ICD-10-CM

## 2019-05-30 DIAGNOSIS — G47.33 OBSTRUCTIVE SLEEP APNEA (ADULT) (PEDIATRIC): ICD-10-CM

## 2019-05-30 DIAGNOSIS — E78.5 HYPERLIPIDEMIA, UNSPECIFIED: ICD-10-CM

## 2019-05-30 DIAGNOSIS — Z87.891 PERSONAL HISTORY OF NICOTINE DEPENDENCE: ICD-10-CM

## 2019-05-30 DIAGNOSIS — Z85.118 PERSONAL HISTORY OF OTHER MALIGNANT NEOPLASM OF BRONCHUS AND LUNG: ICD-10-CM

## 2019-05-30 DIAGNOSIS — Z98.61 CORONARY ANGIOPLASTY STATUS: ICD-10-CM

## 2019-05-30 DIAGNOSIS — R74.0 NONSPECIFIC ELEVATION OF LEVELS OF TRANSAMINASE AND LACTIC ACID DEHYDROGENASE [LDH]: ICD-10-CM

## 2019-05-30 DIAGNOSIS — Z90.2 ACQUIRED ABSENCE OF LUNG [PART OF]: ICD-10-CM

## 2019-05-30 DIAGNOSIS — J44.9 CHRONIC OBSTRUCTIVE PULMONARY DISEASE, UNSPECIFIED: ICD-10-CM

## 2019-05-30 DIAGNOSIS — E55.9 VITAMIN D DEFICIENCY, UNSPECIFIED: ICD-10-CM

## 2019-05-30 DIAGNOSIS — E79.0 HYPERURICEMIA WITHOUT SIGNS OF INFLAMMATORY ARTHRITIS AND TOPHACEOUS DISEASE: ICD-10-CM

## 2019-06-10 ENCOUNTER — OUTPATIENT (OUTPATIENT)
Dept: OUTPATIENT SERVICES | Facility: HOSPITAL | Age: 79
LOS: 1 days | Discharge: HOME | End: 2019-06-10

## 2019-06-10 ENCOUNTER — APPOINTMENT (OUTPATIENT)
Dept: SURGERY | Facility: CLINIC | Age: 79
End: 2019-06-10
Payer: MEDICARE

## 2019-06-10 VITALS
HEIGHT: 73 IN | BODY MASS INDEX: 29.29 KG/M2 | DIASTOLIC BLOOD PRESSURE: 88 MMHG | SYSTOLIC BLOOD PRESSURE: 162 MMHG | WEIGHT: 221 LBS

## 2019-06-10 DIAGNOSIS — K83.09 OTHER CHOLANGITIS: ICD-10-CM

## 2019-06-10 DIAGNOSIS — E89.0 POSTPROCEDURAL HYPOTHYROIDISM: Chronic | ICD-10-CM

## 2019-06-10 DIAGNOSIS — Z90.2 ACQUIRED ABSENCE OF LUNG [PART OF]: Chronic | ICD-10-CM

## 2019-06-10 LAB
ALBUMIN SERPL ELPH-MCNC: 4 G/DL
ALP BLD-CCNC: 145 U/L
ALT SERPL-CCNC: 12 U/L
ANION GAP SERPL CALC-SCNC: 13 MMOL/L
AST SERPL-CCNC: 14 U/L
BASOPHILS # BLD AUTO: 0.03 K/UL
BASOPHILS NFR BLD AUTO: 0.6 %
BILIRUB DIRECT SERPL-MCNC: 0.2 MG/DL
BILIRUB INDIRECT SERPL-MCNC: 0.3 MG/DL
BILIRUB SERPL-MCNC: 0.5 MG/DL
BUN SERPL-MCNC: 11 MG/DL
CALCIUM SERPL-MCNC: 8.5 MG/DL
CHLORIDE SERPL-SCNC: 104 MMOL/L
CO2 SERPL-SCNC: 29 MMOL/L
CREAT SERPL-MCNC: 1 MG/DL
EOSINOPHIL # BLD AUTO: 0.25 K/UL
EOSINOPHIL NFR BLD AUTO: 4.7 %
GLUCOSE SERPL-MCNC: 87 MG/DL
HCT VFR BLD CALC: 37.9 %
HGB BLD-MCNC: 12.1 G/DL
IMM GRANULOCYTES NFR BLD AUTO: 0.2 %
LYMPHOCYTES # BLD AUTO: 1.23 K/UL
LYMPHOCYTES NFR BLD AUTO: 23.2 %
MAN DIFF?: NORMAL
MCHC RBC-ENTMCNC: 27.9 PG
MCHC RBC-ENTMCNC: 31.9 G/DL
MCV RBC AUTO: 87.3 FL
MONOCYTES # BLD AUTO: 0.6 K/UL
MONOCYTES NFR BLD AUTO: 11.3 %
NEUTROPHILS # BLD AUTO: 3.18 K/UL
NEUTROPHILS NFR BLD AUTO: 60 %
PLATELET # BLD AUTO: 150 K/UL
POTASSIUM SERPL-SCNC: 4.2 MMOL/L
PROT SERPL-MCNC: 6.3 G/DL
RBC # BLD: 4.34 M/UL
RBC # FLD: 15 %
SODIUM SERPL-SCNC: 146 MMOL/L
WBC # FLD AUTO: 5.3 K/UL

## 2019-06-10 PROCEDURE — 99214 OFFICE O/P EST MOD 30 MIN: CPT

## 2019-06-10 NOTE — ASSESSMENT
[FreeTextEntry1] : 79yo male with hx of copd cad s/p pci. had an episode of cholangitis with elevated bilirubin, nausea vomiting fevers. over several days patient's condition improved and he was sent home without any procedures. Patient has been having retrosternal chest pain and vomiting yesterday, accompanied by fevers. Patient does not want to go to hospital because he is feeling better. will get some blood work.informed the patient that if he develops pain nausea vomiting fevers to go to the hospital. \par \par 6/10/19: s/p ercp with sphincterotomy and gallstone removal . Patient still has epigastric pain, nausea and vomiting.  We discussed plans for cholecystectomy but my concern that he still has symptoms which might be due to choledocholithiasis. He might need repeat ercp and clearance of cbd prior to surgery. will send labs today  \par

## 2019-06-16 ENCOUNTER — FORM ENCOUNTER (OUTPATIENT)
Age: 79
End: 2019-06-16

## 2019-06-17 ENCOUNTER — OUTPATIENT (OUTPATIENT)
Dept: OUTPATIENT SERVICES | Facility: HOSPITAL | Age: 79
LOS: 1 days | Discharge: HOME | End: 2019-06-17
Payer: MEDICARE

## 2019-06-17 DIAGNOSIS — K83.09 OTHER CHOLANGITIS: ICD-10-CM

## 2019-06-17 DIAGNOSIS — E89.0 POSTPROCEDURAL HYPOTHYROIDISM: Chronic | ICD-10-CM

## 2019-06-17 DIAGNOSIS — Z90.2 ACQUIRED ABSENCE OF LUNG [PART OF]: Chronic | ICD-10-CM

## 2019-06-17 PROCEDURE — 76705 ECHO EXAM OF ABDOMEN: CPT | Mod: 26

## 2019-06-25 ENCOUNTER — FORM ENCOUNTER (OUTPATIENT)
Age: 79
End: 2019-06-25

## 2019-06-26 ENCOUNTER — OUTPATIENT (OUTPATIENT)
Dept: OUTPATIENT SERVICES | Facility: HOSPITAL | Age: 79
LOS: 1 days | Discharge: HOME | End: 2019-06-26
Payer: MEDICARE

## 2019-06-26 VITALS
HEART RATE: 64 BPM | DIASTOLIC BLOOD PRESSURE: 56 MMHG | WEIGHT: 216.05 LBS | RESPIRATION RATE: 16 BRPM | SYSTOLIC BLOOD PRESSURE: 120 MMHG | TEMPERATURE: 95 F | OXYGEN SATURATION: 98 % | HEIGHT: 73 IN

## 2019-06-26 DIAGNOSIS — K80.20 CALCULUS OF GALLBLADDER WITHOUT CHOLECYSTITIS WITHOUT OBSTRUCTION: ICD-10-CM

## 2019-06-26 DIAGNOSIS — Z01.818 ENCOUNTER FOR OTHER PREPROCEDURAL EXAMINATION: ICD-10-CM

## 2019-06-26 DIAGNOSIS — E89.0 POSTPROCEDURAL HYPOTHYROIDISM: Chronic | ICD-10-CM

## 2019-06-26 DIAGNOSIS — Z90.2 ACQUIRED ABSENCE OF LUNG [PART OF]: Chronic | ICD-10-CM

## 2019-06-26 LAB
ALBUMIN SERPL ELPH-MCNC: 4.1 G/DL — SIGNIFICANT CHANGE UP (ref 3.5–5.2)
ALP SERPL-CCNC: 112 U/L — SIGNIFICANT CHANGE UP (ref 30–115)
ALT FLD-CCNC: 11 U/L — SIGNIFICANT CHANGE UP (ref 0–41)
ANION GAP SERPL CALC-SCNC: 12 MMOL/L — SIGNIFICANT CHANGE UP (ref 7–14)
APTT BLD: 35.7 SEC — SIGNIFICANT CHANGE UP (ref 27–39.2)
AST SERPL-CCNC: 14 U/L — SIGNIFICANT CHANGE UP (ref 0–41)
BASOPHILS # BLD AUTO: 0.02 K/UL — SIGNIFICANT CHANGE UP (ref 0–0.2)
BASOPHILS NFR BLD AUTO: 0.4 % — SIGNIFICANT CHANGE UP (ref 0–1)
BILIRUB SERPL-MCNC: 0.8 MG/DL — SIGNIFICANT CHANGE UP (ref 0.2–1.2)
BUN SERPL-MCNC: 14 MG/DL — SIGNIFICANT CHANGE UP (ref 10–20)
CALCIUM SERPL-MCNC: 9.5 MG/DL — SIGNIFICANT CHANGE UP (ref 8.5–10.1)
CHLORIDE SERPL-SCNC: 101 MMOL/L — SIGNIFICANT CHANGE UP (ref 98–110)
CO2 SERPL-SCNC: 28 MMOL/L — SIGNIFICANT CHANGE UP (ref 17–32)
CREAT SERPL-MCNC: 1 MG/DL — SIGNIFICANT CHANGE UP (ref 0.7–1.5)
EOSINOPHIL # BLD AUTO: 0.24 K/UL — SIGNIFICANT CHANGE UP (ref 0–0.7)
EOSINOPHIL NFR BLD AUTO: 4.5 % — SIGNIFICANT CHANGE UP (ref 0–8)
ESTIMATED AVERAGE GLUCOSE: 103 MG/DL — SIGNIFICANT CHANGE UP (ref 68–114)
GLUCOSE SERPL-MCNC: 102 MG/DL — HIGH (ref 70–99)
HBA1C BLD-MCNC: 5.2 % — SIGNIFICANT CHANGE UP (ref 4–5.6)
HCT VFR BLD CALC: 41 % — LOW (ref 42–52)
HGB BLD-MCNC: 13.1 G/DL — LOW (ref 14–18)
IMM GRANULOCYTES NFR BLD AUTO: 0.6 % — HIGH (ref 0.1–0.3)
INR BLD: 1.04 RATIO — SIGNIFICANT CHANGE UP (ref 0.65–1.3)
LYMPHOCYTES # BLD AUTO: 1.5 K/UL — SIGNIFICANT CHANGE UP (ref 1.2–3.4)
LYMPHOCYTES # BLD AUTO: 28 % — SIGNIFICANT CHANGE UP (ref 20.5–51.1)
MCHC RBC-ENTMCNC: 27.7 PG — SIGNIFICANT CHANGE UP (ref 27–31)
MCHC RBC-ENTMCNC: 32 G/DL — SIGNIFICANT CHANGE UP (ref 32–37)
MCV RBC AUTO: 86.7 FL — SIGNIFICANT CHANGE UP (ref 80–94)
MONOCYTES # BLD AUTO: 0.61 K/UL — HIGH (ref 0.1–0.6)
MONOCYTES NFR BLD AUTO: 11.4 % — HIGH (ref 1.7–9.3)
NEUTROPHILS # BLD AUTO: 2.95 K/UL — SIGNIFICANT CHANGE UP (ref 1.4–6.5)
NEUTROPHILS NFR BLD AUTO: 55.1 % — SIGNIFICANT CHANGE UP (ref 42.2–75.2)
NRBC # BLD: 0 /100 WBCS — SIGNIFICANT CHANGE UP (ref 0–0)
PLATELET # BLD AUTO: 185 K/UL — SIGNIFICANT CHANGE UP (ref 130–400)
POTASSIUM SERPL-MCNC: 4.4 MMOL/L — SIGNIFICANT CHANGE UP (ref 3.5–5)
POTASSIUM SERPL-SCNC: 4.4 MMOL/L — SIGNIFICANT CHANGE UP (ref 3.5–5)
PROT SERPL-MCNC: 7.1 G/DL — SIGNIFICANT CHANGE UP (ref 6–8)
PROTHROM AB SERPL-ACNC: 12 SEC — SIGNIFICANT CHANGE UP (ref 9.95–12.87)
RBC # BLD: 4.73 M/UL — SIGNIFICANT CHANGE UP (ref 4.7–6.1)
RBC # FLD: 15.1 % — HIGH (ref 11.5–14.5)
SODIUM SERPL-SCNC: 141 MMOL/L — SIGNIFICANT CHANGE UP (ref 135–146)
WBC # BLD: 5.35 K/UL — SIGNIFICANT CHANGE UP (ref 4.8–10.8)
WBC # FLD AUTO: 5.35 K/UL — SIGNIFICANT CHANGE UP (ref 4.8–10.8)

## 2019-06-26 PROCEDURE — 93010 ELECTROCARDIOGRAM REPORT: CPT

## 2019-06-26 PROCEDURE — 71046 X-RAY EXAM CHEST 2 VIEWS: CPT | Mod: 26

## 2019-06-26 RX ORDER — MONTELUKAST 4 MG/1
1 TABLET, CHEWABLE ORAL
Qty: 0 | Refills: 0 | DISCHARGE

## 2019-06-26 NOTE — H&P PST ADULT - NSICDXPASTMEDICALHX_GEN_ALL_CORE_FT
PAST MEDICAL HISTORY:  Chronic obstructive pulmonary disease, unspecified COPD type     Coronary artery disease involving native heart, angina presence unspecified, unspecified vessel or lesion type     Dyslipidemia     Gastroesophageal reflux disease, esophagitis presence not specified     Hypertension, unspecified type     Hyperuricemia     Lung cancer     ALTAF (obstructive sleep apnea) cpap    Osteopenia     Thyroid cancer

## 2019-06-26 NOTE — H&P PST ADULT - REASON FOR ADMISSION
77 yo male presents s/p hospitalization 5/2019, was diagnosed w/ gallstones, scheduled for lap dee;  denies chest pain, palpitations, shortness of breath, dyspnea, or dysuria. exercise tolerance: 2 blocks/ flights of stairs w/o sob

## 2019-07-08 PROBLEM — G47.33 OBSTRUCTIVE SLEEP APNEA (ADULT) (PEDIATRIC): Chronic | Status: ACTIVE | Noted: 2018-02-04

## 2019-07-09 ENCOUNTER — APPOINTMENT (OUTPATIENT)
Dept: SURGERY | Facility: HOSPITAL | Age: 79
End: 2019-07-09

## 2019-07-09 ENCOUNTER — RESULT REVIEW (OUTPATIENT)
Age: 79
End: 2019-07-09

## 2019-07-09 ENCOUNTER — INPATIENT (INPATIENT)
Facility: HOSPITAL | Age: 79
LOS: 0 days | Discharge: HOME | End: 2019-07-10
Attending: SURGERY | Admitting: SURGERY
Payer: MEDICARE

## 2019-07-09 VITALS
WEIGHT: 218.04 LBS | DIASTOLIC BLOOD PRESSURE: 51 MMHG | SYSTOLIC BLOOD PRESSURE: 89 MMHG | RESPIRATION RATE: 18 BRPM | HEART RATE: 60 BPM | TEMPERATURE: 98 F | HEIGHT: 73 IN

## 2019-07-09 DIAGNOSIS — Z90.2 ACQUIRED ABSENCE OF LUNG [PART OF]: Chronic | ICD-10-CM

## 2019-07-09 DIAGNOSIS — E89.0 POSTPROCEDURAL HYPOTHYROIDISM: Chronic | ICD-10-CM

## 2019-07-09 LAB
ALBUMIN SERPL ELPH-MCNC: 3.3 G/DL — LOW (ref 3.5–5.2)
ALP SERPL-CCNC: 102 U/L — SIGNIFICANT CHANGE UP (ref 30–115)
ALT FLD-CCNC: 21 U/L — SIGNIFICANT CHANGE UP (ref 0–41)
ANION GAP SERPL CALC-SCNC: 15 MMOL/L — HIGH (ref 7–14)
AST SERPL-CCNC: 32 U/L — SIGNIFICANT CHANGE UP (ref 0–41)
BASOPHILS # BLD AUTO: 0.01 K/UL — SIGNIFICANT CHANGE UP (ref 0–0.2)
BASOPHILS NFR BLD AUTO: 0.2 % — SIGNIFICANT CHANGE UP (ref 0–1)
BILIRUB DIRECT SERPL-MCNC: <0.2 MG/DL — SIGNIFICANT CHANGE UP (ref 0–0.2)
BILIRUB INDIRECT FLD-MCNC: >0.2 MG/DL — SIGNIFICANT CHANGE UP (ref 0.2–1.2)
BILIRUB SERPL-MCNC: 0.4 MG/DL — SIGNIFICANT CHANGE UP (ref 0.2–1.2)
BUN SERPL-MCNC: 11 MG/DL — SIGNIFICANT CHANGE UP (ref 10–20)
CALCIUM SERPL-MCNC: 8.3 MG/DL — LOW (ref 8.5–10.1)
CHLORIDE SERPL-SCNC: 100 MMOL/L — SIGNIFICANT CHANGE UP (ref 98–110)
CO2 SERPL-SCNC: 24 MMOL/L — SIGNIFICANT CHANGE UP (ref 17–32)
CREAT SERPL-MCNC: 1.1 MG/DL — SIGNIFICANT CHANGE UP (ref 0.7–1.5)
EOSINOPHIL # BLD AUTO: 0 K/UL — SIGNIFICANT CHANGE UP (ref 0–0.7)
EOSINOPHIL NFR BLD AUTO: 0 % — SIGNIFICANT CHANGE UP (ref 0–8)
GLUCOSE BLDC GLUCOMTR-MCNC: 136 MG/DL — HIGH (ref 70–99)
GLUCOSE SERPL-MCNC: 164 MG/DL — HIGH (ref 70–99)
HCT VFR BLD CALC: 37.5 % — LOW (ref 42–52)
HGB BLD-MCNC: 12.2 G/DL — LOW (ref 14–18)
IMM GRANULOCYTES NFR BLD AUTO: 0.3 % — SIGNIFICANT CHANGE UP (ref 0.1–0.3)
LYMPHOCYTES # BLD AUTO: 0.36 K/UL — LOW (ref 1.2–3.4)
LYMPHOCYTES # BLD AUTO: 5.7 % — LOW (ref 20.5–51.1)
MAGNESIUM SERPL-MCNC: 1.2 MG/DL — LOW (ref 1.8–2.4)
MCHC RBC-ENTMCNC: 28.2 PG — SIGNIFICANT CHANGE UP (ref 27–31)
MCHC RBC-ENTMCNC: 32.5 G/DL — SIGNIFICANT CHANGE UP (ref 32–37)
MCV RBC AUTO: 86.6 FL — SIGNIFICANT CHANGE UP (ref 80–94)
MONOCYTES # BLD AUTO: 0.1 K/UL — SIGNIFICANT CHANGE UP (ref 0.1–0.6)
MONOCYTES NFR BLD AUTO: 1.6 % — LOW (ref 1.7–9.3)
NEUTROPHILS # BLD AUTO: 5.84 K/UL — SIGNIFICANT CHANGE UP (ref 1.4–6.5)
NEUTROPHILS NFR BLD AUTO: 92.2 % — HIGH (ref 42.2–75.2)
NRBC # BLD: 0 /100 WBCS — SIGNIFICANT CHANGE UP (ref 0–0)
PHOSPHATE SERPL-MCNC: 2.9 MG/DL — SIGNIFICANT CHANGE UP (ref 2.1–4.9)
PLATELET # BLD AUTO: 157 K/UL — SIGNIFICANT CHANGE UP (ref 130–400)
POTASSIUM SERPL-MCNC: 4.3 MMOL/L — SIGNIFICANT CHANGE UP (ref 3.5–5)
POTASSIUM SERPL-SCNC: 4.3 MMOL/L — SIGNIFICANT CHANGE UP (ref 3.5–5)
PROT SERPL-MCNC: 6.5 G/DL — SIGNIFICANT CHANGE UP (ref 6–8)
RBC # BLD: 4.33 M/UL — LOW (ref 4.7–6.1)
RBC # FLD: 14.6 % — HIGH (ref 11.5–14.5)
SODIUM SERPL-SCNC: 139 MMOL/L — SIGNIFICANT CHANGE UP (ref 135–146)
WBC # BLD: 6.33 K/UL — SIGNIFICANT CHANGE UP (ref 4.8–10.8)
WBC # FLD AUTO: 6.33 K/UL — SIGNIFICANT CHANGE UP (ref 4.8–10.8)

## 2019-07-09 PROCEDURE — 47563 LAPARO CHOLECYSTECTOMY/GRAPH: CPT

## 2019-07-09 PROCEDURE — 88304 TISSUE EXAM BY PATHOLOGIST: CPT | Mod: 26

## 2019-07-09 RX ORDER — MONTELUKAST 4 MG/1
10 TABLET, CHEWABLE ORAL AT BEDTIME
Refills: 0 | Status: DISCONTINUED | OUTPATIENT
Start: 2019-07-09 | End: 2019-07-10

## 2019-07-09 RX ORDER — LOSARTAN POTASSIUM 100 MG/1
100 TABLET, FILM COATED ORAL DAILY
Refills: 0 | Status: DISCONTINUED | OUTPATIENT
Start: 2019-07-09 | End: 2019-07-10

## 2019-07-09 RX ORDER — PANTOPRAZOLE SODIUM 20 MG/1
40 TABLET, DELAYED RELEASE ORAL
Refills: 0 | Status: DISCONTINUED | OUTPATIENT
Start: 2019-07-09 | End: 2019-07-10

## 2019-07-09 RX ORDER — ACETAMINOPHEN 500 MG
650 TABLET ORAL EVERY 6 HOURS
Refills: 0 | Status: DISCONTINUED | OUTPATIENT
Start: 2019-07-09 | End: 2019-07-10

## 2019-07-09 RX ORDER — SODIUM CHLORIDE 9 MG/ML
1000 INJECTION, SOLUTION INTRAVENOUS
Refills: 0 | Status: DISCONTINUED | OUTPATIENT
Start: 2019-07-09 | End: 2019-07-10

## 2019-07-09 RX ORDER — ASPIRIN/CALCIUM CARB/MAGNESIUM 324 MG
81 TABLET ORAL DAILY
Refills: 0 | Status: DISCONTINUED | OUTPATIENT
Start: 2019-07-09 | End: 2019-07-10

## 2019-07-09 RX ORDER — HYDROMORPHONE HYDROCHLORIDE 2 MG/ML
1 INJECTION INTRAMUSCULAR; INTRAVENOUS; SUBCUTANEOUS
Refills: 0 | Status: DISCONTINUED | OUTPATIENT
Start: 2019-07-09 | End: 2019-07-09

## 2019-07-09 RX ORDER — ATORVASTATIN CALCIUM 80 MG/1
10 TABLET, FILM COATED ORAL AT BEDTIME
Refills: 0 | Status: DISCONTINUED | OUTPATIENT
Start: 2019-07-09 | End: 2019-07-10

## 2019-07-09 RX ORDER — IBUPROFEN 200 MG
400 TABLET ORAL EVERY 6 HOURS
Refills: 0 | Status: DISCONTINUED | OUTPATIENT
Start: 2019-07-09 | End: 2019-07-10

## 2019-07-09 RX ORDER — ALLOPURINOL 300 MG
100 TABLET ORAL
Refills: 0 | Status: DISCONTINUED | OUTPATIENT
Start: 2019-07-09 | End: 2019-07-10

## 2019-07-09 RX ORDER — ONDANSETRON 8 MG/1
4 TABLET, FILM COATED ORAL
Refills: 0 | Status: DISCONTINUED | OUTPATIENT
Start: 2019-07-09 | End: 2019-07-09

## 2019-07-09 RX ORDER — HYDROMORPHONE HYDROCHLORIDE 2 MG/ML
0.5 INJECTION INTRAMUSCULAR; INTRAVENOUS; SUBCUTANEOUS
Refills: 0 | Status: DISCONTINUED | OUTPATIENT
Start: 2019-07-09 | End: 2019-07-09

## 2019-07-09 RX ORDER — HEPARIN SODIUM 5000 [USP'U]/ML
5000 INJECTION INTRAVENOUS; SUBCUTANEOUS EVERY 8 HOURS
Refills: 0 | Status: DISCONTINUED | OUTPATIENT
Start: 2019-07-09 | End: 2019-07-10

## 2019-07-09 RX ORDER — SENNA PLUS 8.6 MG/1
2 TABLET ORAL AT BEDTIME
Refills: 0 | Status: DISCONTINUED | OUTPATIENT
Start: 2019-07-09 | End: 2019-07-10

## 2019-07-09 RX ORDER — MORPHINE SULFATE 50 MG/1
2 CAPSULE, EXTENDED RELEASE ORAL EVERY 6 HOURS
Refills: 0 | Status: DISCONTINUED | OUTPATIENT
Start: 2019-07-09 | End: 2019-07-10

## 2019-07-09 RX ORDER — DOCUSATE SODIUM 100 MG
100 CAPSULE ORAL THREE TIMES A DAY
Refills: 0 | Status: DISCONTINUED | OUTPATIENT
Start: 2019-07-09 | End: 2019-07-10

## 2019-07-09 RX ORDER — ONDANSETRON 8 MG/1
4 TABLET, FILM COATED ORAL EVERY 6 HOURS
Refills: 0 | Status: DISCONTINUED | OUTPATIENT
Start: 2019-07-09 | End: 2019-07-10

## 2019-07-09 RX ORDER — METOPROLOL TARTRATE 50 MG
50 TABLET ORAL DAILY
Refills: 0 | Status: DISCONTINUED | OUTPATIENT
Start: 2019-07-09 | End: 2019-07-10

## 2019-07-09 RX ORDER — LEVOTHYROXINE SODIUM 125 MCG
150 TABLET ORAL DAILY
Refills: 0 | Status: DISCONTINUED | OUTPATIENT
Start: 2019-07-09 | End: 2019-07-10

## 2019-07-09 RX ORDER — ISOSORBIDE MONONITRATE 60 MG/1
30 TABLET, EXTENDED RELEASE ORAL DAILY
Refills: 0 | Status: DISCONTINUED | OUTPATIENT
Start: 2019-07-09 | End: 2019-07-10

## 2019-07-09 RX ORDER — SODIUM CHLORIDE 9 MG/ML
1000 INJECTION, SOLUTION INTRAVENOUS
Refills: 0 | Status: DISCONTINUED | OUTPATIENT
Start: 2019-07-09 | End: 2019-07-09

## 2019-07-09 RX ADMIN — HEPARIN SODIUM 5000 UNIT(S): 5000 INJECTION INTRAVENOUS; SUBCUTANEOUS at 21:25

## 2019-07-09 RX ADMIN — Medication 650 MILLIGRAM(S): at 19:37

## 2019-07-09 RX ADMIN — SODIUM CHLORIDE 75 MILLILITER(S): 9 INJECTION, SOLUTION INTRAVENOUS at 14:01

## 2019-07-09 RX ADMIN — Medication 100 MILLIGRAM(S): at 19:37

## 2019-07-09 RX ADMIN — Medication 100 MILLIGRAM(S): at 21:24

## 2019-07-09 RX ADMIN — Medication 400 MILLIGRAM(S): at 19:36

## 2019-07-09 RX ADMIN — Medication 400 MILLIGRAM(S): at 19:37

## 2019-07-09 RX ADMIN — Medication 650 MILLIGRAM(S): at 19:36

## 2019-07-09 RX ADMIN — ATORVASTATIN CALCIUM 10 MILLIGRAM(S): 80 TABLET, FILM COATED ORAL at 21:24

## 2019-07-09 RX ADMIN — MONTELUKAST 10 MILLIGRAM(S): 4 TABLET, CHEWABLE ORAL at 21:25

## 2019-07-09 NOTE — PRE-ANESTHESIA EVALUATION ADULT - NSANTHAIRWAYFT_ENT_ALL_CORE
Neck supple, FROM, no masses Neck supple, severely limited neck extension, old tracheostomy scar, no masses

## 2019-07-09 NOTE — BRIEF OPERATIVE NOTE - NSICDXBRIEFPOSTOP_GEN_ALL_CORE_FT
POST-OP DIAGNOSIS:  Choledocholithiasis with chronic cholecystitis 09-Jul-2019 13:47:34  Adele Nunez

## 2019-07-09 NOTE — BRIEF OPERATIVE NOTE - NSICDXBRIEFPROCEDURE_GEN_ALL_CORE_FT
PROCEDURES:  Intraoperative cholangiogram 09-Jul-2019 13:47:08  Adele Nunez  Cholecystectomy, laparoscopic 09-Jul-2019 13:46:51  Adele Nunez

## 2019-07-09 NOTE — PRE-ANESTHESIA EVALUATION ADULT - NSANTHPEFT_GEN_ALL_CORE
Pale, elderly WM in NAD.  A & O X 3  Chest:  Decreased BLBS, right greater than left, mild expiratory wheezes  Cor:  Reg. S1S2 no murmur

## 2019-07-09 NOTE — ASU PATIENT PROFILE, ADULT - PMH
Chronic obstructive pulmonary disease, unspecified COPD type    Coronary artery disease involving native heart, angina presence unspecified, unspecified vessel or lesion type    Dyslipidemia    Gastroesophageal reflux disease, esophagitis presence not specified    Hypertension, unspecified type    Hyperuricemia    Lung cancer    ALTAF (obstructive sleep apnea)  cpap  Osteopenia    Thyroid cancer

## 2019-07-09 NOTE — PRE-ANESTHESIA EVALUATION ADULT - NSANTHADDINFOFT_GEN_ALL_CORE
79 y/o WM evaluated for laparoscopic cholecystectomy.  ASA 4.  Plan GA.  Plan, benefits, foreseeable risks, viable alternatives discussed with patient and all his pertinent questions answered and he understands and elects to proceed.

## 2019-07-10 ENCOUNTER — TRANSCRIPTION ENCOUNTER (OUTPATIENT)
Age: 79
End: 2019-07-10

## 2019-07-10 ENCOUNTER — APPOINTMENT (OUTPATIENT)
Dept: GASTROENTEROLOGY | Facility: CLINIC | Age: 79
End: 2019-07-10

## 2019-07-10 VITALS
SYSTOLIC BLOOD PRESSURE: 118 MMHG | DIASTOLIC BLOOD PRESSURE: 58 MMHG | TEMPERATURE: 97 F | RESPIRATION RATE: 18 BRPM | HEART RATE: 77 BPM

## 2019-07-10 LAB — MAGNESIUM SERPL-MCNC: 2.2 MG/DL — SIGNIFICANT CHANGE UP (ref 1.8–2.4)

## 2019-07-10 PROCEDURE — 99024 POSTOP FOLLOW-UP VISIT: CPT

## 2019-07-10 RX ORDER — METHYLPREDNISOLONE 4 MG
500 TABLET ORAL ONCE
Refills: 0 | Status: COMPLETED | OUTPATIENT
Start: 2019-07-10 | End: 2019-07-10

## 2019-07-10 RX ORDER — MAGNESIUM SULFATE 500 MG/ML
2 VIAL (ML) INJECTION ONCE
Refills: 0 | Status: COMPLETED | OUTPATIENT
Start: 2019-07-10 | End: 2019-07-10

## 2019-07-10 RX ORDER — OXYCODONE HYDROCHLORIDE 5 MG/1
1 TABLET ORAL
Qty: 5 | Refills: 0
Start: 2019-07-10 | End: 2019-07-14

## 2019-07-10 RX ORDER — METHYLPREDNISOLONE 4 MG
500 TABLET ORAL ONCE
Refills: 0 | Status: DISCONTINUED | OUTPATIENT
Start: 2019-07-10 | End: 2019-07-10

## 2019-07-10 RX ADMIN — Medication 50 GRAM(S): at 05:10

## 2019-07-10 RX ADMIN — PANTOPRAZOLE SODIUM 40 MILLIGRAM(S): 20 TABLET, DELAYED RELEASE ORAL at 05:11

## 2019-07-10 RX ADMIN — Medication 100 MILLIGRAM(S): at 05:11

## 2019-07-10 RX ADMIN — SODIUM CHLORIDE 75 MILLILITER(S): 9 INJECTION, SOLUTION INTRAVENOUS at 03:16

## 2019-07-10 RX ADMIN — Medication 650 MILLIGRAM(S): at 05:11

## 2019-07-10 RX ADMIN — Medication 650 MILLIGRAM(S): at 03:14

## 2019-07-10 RX ADMIN — Medication 50 GRAM(S): at 08:38

## 2019-07-10 RX ADMIN — Medication 150 MICROGRAM(S): at 05:11

## 2019-07-10 RX ADMIN — Medication 50 MILLIGRAM(S): at 05:11

## 2019-07-10 RX ADMIN — Medication 650 MILLIGRAM(S): at 00:16

## 2019-07-10 RX ADMIN — ISOSORBIDE MONONITRATE 30 MILLIGRAM(S): 60 TABLET, EXTENDED RELEASE ORAL at 12:38

## 2019-07-10 RX ADMIN — Medication 400 MILLIGRAM(S): at 12:38

## 2019-07-10 RX ADMIN — Medication 100 MILLIGRAM(S): at 12:42

## 2019-07-10 RX ADMIN — Medication 400 MILLIGRAM(S): at 00:16

## 2019-07-10 RX ADMIN — HEPARIN SODIUM 5000 UNIT(S): 5000 INJECTION INTRAVENOUS; SUBCUTANEOUS at 12:37

## 2019-07-10 RX ADMIN — LOSARTAN POTASSIUM 100 MILLIGRAM(S): 100 TABLET, FILM COATED ORAL at 05:11

## 2019-07-10 RX ADMIN — Medication 400 MILLIGRAM(S): at 05:12

## 2019-07-10 RX ADMIN — Medication 500 MILLIGRAM(S): at 12:37

## 2019-07-10 RX ADMIN — HEPARIN SODIUM 5000 UNIT(S): 5000 INJECTION INTRAVENOUS; SUBCUTANEOUS at 05:10

## 2019-07-10 RX ADMIN — Medication 400 MILLIGRAM(S): at 03:14

## 2019-07-10 RX ADMIN — Medication 650 MILLIGRAM(S): at 12:38

## 2019-07-10 RX ADMIN — Medication 81 MILLIGRAM(S): at 12:38

## 2019-07-10 NOTE — DISCHARGE NOTE NURSING/CASE MANAGEMENT/SOCIAL WORK - NSDCDPATPORTLINK_GEN_ALL_CORE
You can access the [facility] Patient Portal, offered by [facility], by registering with the following website: [email or web address]

## 2019-07-10 NOTE — PROGRESS NOTE ADULT - SUBJECTIVE AND OBJECTIVE BOX
GENERAL SURGERY PROGRESS NOTE     HERIBERTO RODRIGUEZ  78y  Male  Hospital day :2d  POD:1  Procedure: Intraoperative cholangiogram  Cholecystectomy, laparoscopic    OVERNIGHT EVENTS:    T(F): 97.2 (07-10-19 @ 00:00), Max: 98 (07-09-19 @ 09:39)  HR: 77 (07-10-19 @ 00:00) (57 - 77)  BP: 144/66 (07-10-19 @ 00:00) (89/51 - 149/63)  RR: 16 (07-10-19 @ 00:00) (14 - 23)  SpO2: 95% (07-09-19 @ 17:19) (95% - 100%)    DIET/FLUIDS: lactated ringers. 1000 milliLiter(s) IV Continuous <Continuous>  magnesium sulfate  IVPB 2 Gram(s) IV Intermittent once    URINE:      GI proph:  pantoprazole    Tablet 40 milliGRAM(s) Oral before breakfast    AC/ proph: aspirin  chewable 81 milliGRAM(s) Oral daily  heparin  Injectable 5000 Unit(s) SubCutaneous every 8 hours    PHYSICAL EXAM:  GENERAL: NAD, well-appearing  CHEST/LUNG: Clear to auscultation bilaterally  HEART: Regular rate and rhythm  ABDOMEN: Soft, Nontender, Nondistended;   EXTREMITIES:  No clubbing, cyanosis, or edema    Labs:  CAPILLARY BLOOD GLUCOSE    POCT Blood Glucose.: 136 mg/dL (09 Jul 2019 14:44)                       12.2   6.33  )-----------( 157      ( 09 Jul 2019 21:57 )             37.5       Auto Neutrophil %: 92.2 % (07-09-19 @ 21:57)  Auto Immature Granulocyte %: 0.3 % (07-09-19 @ 21:57)    07-09    139  |  100  |  11  ----------------------------<  164<H>  4.3   |  24  |  1.1      Calcium, Total Serum: 8.3 mg/dL (07-09-19 @ 21:57)    LFTs:             6.5  | 0.4  | 32       ------------------[102     ( 09 Jul 2019 21:57 )  3.3  | <0.2 | 21 GENERAL SURGERY PROGRESS NOTE     HERIBERTO RODRIGUEZ  78y  Male  Hospital day :2d  POD:1  Procedure: Intraoperative cholangiogram  Cholecystectomy, laparoscopic    OVERNIGHT EVENTS: patient tolerated regular diet, pain was well controlled with PO medications, patient is ambulating independently and voiding and passing gas    T(F): 97.2 (07-10-19 @ 00:00), Max: 98 (07-09-19 @ 09:39)  HR: 77 (07-10-19 @ 00:00) (57 - 77)  BP: 144/66 (07-10-19 @ 00:00) (89/51 - 149/63)  RR: 16 (07-10-19 @ 00:00) (14 - 23)  SpO2: 95% (07-09-19 @ 17:19) (95% - 100%)    DIET/FLUIDS: lactated ringers. 1000 milliLiter(s) IV Continuous <Continuous>  magnesium sulfate  IVPB 2 Gram(s) IV Intermittent once     GI proph:  pantoprazole    Tablet 40 milliGRAM(s) Oral before breakfast    AC/ proph: aspirin  chewable 81 milliGRAM(s) Oral daily  heparin  Injectable 5000 Unit(s) SubCutaneous every 8 hours    PHYSICAL EXAM:  GENERAL: NAD, well-appearing  CHEST/LUNG: Clear to auscultation bilaterally  HEART: Regular rate and rhythm  ABDOMEN: Soft, appropriately tender over incision sites, Nondistended; dressings clean dry and intact  EXTREMITIES:  No clubbing, cyanosis, or edema    Labs:  CAPILLARY BLOOD GLUCOSE    POCT Blood Glucose.: 136 mg/dL (09 Jul 2019 14:44)                       12.2   6.33  )-----------( 157      ( 09 Jul 2019 21:57 )             37.5       Auto Neutrophil %: 92.2 % (07-09-19 @ 21:57)  Auto Immature Granulocyte %: 0.3 % (07-09-19 @ 21:57)    07-09    139  |  100  |  11  ----------------------------<  164<H>  4.3   |  24  |  1.1      Calcium, Total Serum: 8.3 mg/dL (07-09-19 @ 21:57)    LFTs:             6.5  | 0.4  | 32       ------------------[102     ( 09 Jul 2019 21:57 )  3.3  | <0.2 | 21

## 2019-07-10 NOTE — DISCHARGE NOTE PROVIDER - HOSPITAL COURSE
79 y/o male with history of of cholangitis and CBD obstruction presented on 7/9 for elective cholecystectomy and Intraoperative cholangiogram.     Pt went to OR . Post operatively doing well. Vital signs stable , afebrile. pt has no complaints.  pt tolerated Po diet and ambulated.    Labs remarkable for magnesium of 1.2 which was repleted.      per Dr. Lemon. pt discharged home in stable condition on 7/10 79 y/o male with history of of cholangitis and CBD obstruction presented on 7/9 for elective cholecystectomy and Intraoperative cholangiogram. there was a filling defect identified on the cholangiogram    Post operatively doing well. Vital signs stable , afebrile. pt has no complaints.  pt tolerated Po diet and ambulated.    post op Labs remarkable for magnesium of 1.2 which was repleted.      per Dr. Lemon. pt discharged home in stable condition on 7/10

## 2019-07-10 NOTE — DISCHARGE NOTE PROVIDER - CARE PROVIDER_API CALL
Blas Lemon)  [facility]  [address] [place] [address], [address] [place] [address]  Phone: [phone]  Fax: [phone]  Follow Up Time:

## 2019-07-10 NOTE — DISCHARGE NOTE PROVIDER - NSDCFUADDINST_GEN_ALL_CORE_FT
FOLLOW UP:  1. Follow up with Dr Lemon in 1-2 weeks. Call office for appointment.   2. Follow up with your Primary MD within 1 week  3. Keep wound clean and dry. Can remove the dressing in 2 days, dont remove the steri strips, can take a shower after removing the dressing.   4. no strenuous activity or heavy lifting.  5. If experience fever, chest pain, shortness of breath, dizziness, vomiting , bleeding or drainage from wound call Primary MD or return to ED  6. Take tylenol and motrin as needed for pain.   7. continue taking you regular home medications FOLLOW UP:  1. Follow up with Dr Lemon in 1-2 weeks. Call office for appointment.   2. Follow up with your Primary MD within 1 week  3. Keep wound clean and dry. Can remove the dressing in 2 days, dont remove the steri strips, can take a shower after removing the dressing.   4. no strenuous activity or heavy lifting.  5. If experience fever, chest pain, shortness of breath, dizziness, vomiting , bleeding or drainage from wound call Primary MD or return to ED  6. Take tylenol every 6 hours and motrin every 8 hours for 3 days for pain. prescription for roxicodone was sent to pharmacy. Take 1 tab for severe pain as needed  7. continue taking you regular home medications

## 2019-07-10 NOTE — PROGRESS NOTE ADULT - ASSESSMENT
Assessment:  77yo M s/p laparoscopic cholecystectomy with intraoperative cholangiogram    Plan:  -F/U BMP repletion Assessment:  79yo M s/p laparoscopic cholecystectomy with intraoperative cholangiogram    Plan:  -F/U BMP repletion  -D/C home

## 2019-07-12 LAB — SURGICAL PATHOLOGY STUDY: SIGNIFICANT CHANGE UP

## 2019-07-15 DIAGNOSIS — K80.50 CALCULUS OF BILE DUCT WITHOUT CHOLANGITIS OR CHOLECYSTITIS WITHOUT OBSTRUCTION: ICD-10-CM

## 2019-07-15 DIAGNOSIS — Z87.891 PERSONAL HISTORY OF NICOTINE DEPENDENCE: ICD-10-CM

## 2019-07-15 DIAGNOSIS — E83.42 HYPOMAGNESEMIA: ICD-10-CM

## 2019-07-22 ENCOUNTER — APPOINTMENT (OUTPATIENT)
Dept: SURGERY | Facility: CLINIC | Age: 79
End: 2019-07-22
Payer: MEDICARE

## 2019-07-22 VITALS
HEIGHT: 73 IN | DIASTOLIC BLOOD PRESSURE: 84 MMHG | BODY MASS INDEX: 29.16 KG/M2 | WEIGHT: 220 LBS | SYSTOLIC BLOOD PRESSURE: 138 MMHG

## 2019-07-22 PROCEDURE — 99024 POSTOP FOLLOW-UP VISIT: CPT

## 2019-07-23 NOTE — ASSESSMENT
[FreeTextEntry1] : 77yo male with hx of copd cad s/p pci. had an episode of cholangitis with elevated bilirubin, nausea vomiting fevers. over several days patient's condition improved and he was sent home without any procedures. Patient has been having retrosternal chest pain and vomiting yesterday, accompanied by fevers. Patient does not want to go to hospital because he is feeling better. will get some blood work.informed the patient that if he develops pain nausea vomiting fevers to go to the hospital. \par \par 6/10/19: s/p ercp with sphincterotomy and gallstone removal . Patient still has epigastric pain, nausea and vomiting.  We discussed plans for cholecystectomy but my concern that he still has symptoms which might be due to choledocholithiasis. He might need repeat ercp and clearance of cbd prior to surgery. will send labs today \par \par 7/22/19: s/p lap cholecystectomy with intraoperative cholangiogram. Cholangiogram showed some filing defects that cleared after flushing and passing the catheter. patient was kept overnight with no elevations in lft's. \par patient is doing well tolerating diet. no pain. fu prn \par

## 2019-08-21 ENCOUNTER — APPOINTMENT (OUTPATIENT)
Dept: GASTROENTEROLOGY | Facility: CLINIC | Age: 79
End: 2019-08-21
Payer: MEDICARE

## 2019-08-21 VITALS
DIASTOLIC BLOOD PRESSURE: 68 MMHG | BODY MASS INDEX: 29.16 KG/M2 | HEIGHT: 73 IN | SYSTOLIC BLOOD PRESSURE: 138 MMHG | HEART RATE: 58 BPM | WEIGHT: 220 LBS

## 2019-08-21 DIAGNOSIS — K80.50 CALCULUS OF BILE DUCT W/OUT CHOLANGITIS OR CHOLECYSTITIS W/OUT OBSTRUCTION: ICD-10-CM

## 2019-08-21 DIAGNOSIS — Z87.19 PERSONAL HISTORY OF OTHER DISEASES OF THE DIGESTIVE SYSTEM: ICD-10-CM

## 2019-08-21 DIAGNOSIS — Z86.79 PERSONAL HISTORY OF OTHER DISEASES OF THE CIRCULATORY SYSTEM: ICD-10-CM

## 2019-08-21 DIAGNOSIS — Z85.118 PERSONAL HISTORY OF OTHER MALIGNANT NEOPLASM OF BRONCHUS AND LUNG: ICD-10-CM

## 2019-08-21 DIAGNOSIS — Z86.69 PERSONAL HISTORY OF OTHER DISEASES OF THE NERVOUS SYSTEM AND SENSE ORGANS: ICD-10-CM

## 2019-08-21 DIAGNOSIS — Z87.09 PERSONAL HISTORY OF OTHER DISEASES OF THE RESPIRATORY SYSTEM: ICD-10-CM

## 2019-08-21 DIAGNOSIS — Z85.850 PERSONAL HISTORY OF MALIGNANT NEOPLASM OF THYROID: ICD-10-CM

## 2019-08-21 DIAGNOSIS — Z86.39 PERSONAL HISTORY OF OTHER ENDOCRINE, NUTRITIONAL AND METABOLIC DISEASE: ICD-10-CM

## 2019-08-21 DIAGNOSIS — Z78.9 OTHER SPECIFIED HEALTH STATUS: ICD-10-CM

## 2019-08-21 DIAGNOSIS — K83.09 OTHER CHOLANGITIS: ICD-10-CM

## 2019-08-21 DIAGNOSIS — Z87.442 PERSONAL HISTORY OF URINARY CALCULI: ICD-10-CM

## 2019-08-21 DIAGNOSIS — K80.20 CALCULUS OF GALLBLADDER W/OUT CHOLECYSTITIS W/OUT OBSTRUCTION: ICD-10-CM

## 2019-08-21 PROCEDURE — 99213 OFFICE O/P EST LOW 20 MIN: CPT

## 2019-08-21 RX ORDER — MONTELUKAST 10 MG/1
10 TABLET, FILM COATED ORAL
Refills: 0 | Status: ACTIVE | COMMUNITY

## 2019-08-21 RX ORDER — ASPIRIN 81 MG
81 TABLET, DELAYED RELEASE (ENTERIC COATED) ORAL
Refills: 0 | Status: ACTIVE | COMMUNITY

## 2019-08-21 RX ORDER — ISOSORBIDE DINITRATE 30 MG/1
30 TABLET ORAL
Refills: 0 | Status: ACTIVE | COMMUNITY

## 2019-08-21 RX ORDER — VALSARTAN 160 MG/1
160 TABLET, COATED ORAL
Refills: 0 | Status: ACTIVE | COMMUNITY

## 2019-08-21 RX ORDER — ATORVASTATIN CALCIUM 40 MG/1
40 TABLET, FILM COATED ORAL
Refills: 0 | Status: ACTIVE | COMMUNITY

## 2019-08-21 RX ORDER — LEVOTHYROXINE SODIUM 0.14 MG/1
137 TABLET ORAL
Refills: 0 | Status: ACTIVE | COMMUNITY

## 2019-08-21 RX ORDER — FLUTICASONE FUROATE AND VILANTEROL TRIFENATATE 100; 25 UG/1; UG/1
100-25 POWDER RESPIRATORY (INHALATION)
Refills: 0 | Status: ACTIVE | COMMUNITY

## 2019-08-21 RX ORDER — METOPROLOL TARTRATE 50 MG/1
50 TABLET, FILM COATED ORAL
Refills: 0 | Status: ACTIVE | COMMUNITY

## 2019-08-21 RX ORDER — DICYCLOMINE HYDROCHLORIDE 10 MG/1
10 CAPSULE ORAL
Refills: 0 | Status: ACTIVE | COMMUNITY

## 2019-08-21 RX ORDER — ALLOPURINOL 100 MG/1
100 TABLET ORAL
Refills: 0 | Status: ACTIVE | COMMUNITY

## 2019-08-21 RX ORDER — PANTOPRAZOLE SODIUM 40 MG/10ML
40 INJECTION, POWDER, FOR SOLUTION INTRAVENOUS
Refills: 0 | Status: ACTIVE | COMMUNITY

## 2019-08-21 NOTE — HISTORY OF PRESENT ILLNESS
[de-identified] : Patient is a 78-year-old gentleman with past medical history of hypertension, hypothyroid, COPD, hyperlipidemia, previous lung cancer with lumpectomy of left lower lobe of the lung, and recent hospitalization for cholangitis secondary to choledocholithiasis. Patient had ERCP at [facility] done in May of 2019 with sphincterotomy, stone extraction, and hemostasis. Patient later had cholecystectomy at [facility] in early July 2019. Patient feels much better since initial hospitalization but notes occasional abdominal pain which has improved gradually since cholecystectomy. Patient without any changes in diet, weight, bowel habits, bloody stools, or any upper GI symptoms.

## 2019-08-21 NOTE — PHYSICAL EXAM
[General Appearance - Alert] : alert [General Appearance - In No Acute Distress] : in no acute distress [Sclera] : the sclera and conjunctiva were normal [PERRL With Normal Accommodation] : pupils were equal in size, round, and reactive to light [Extraocular Movements] : extraocular movements were intact [Outer Ear] : the ears and nose were normal in appearance [Oropharynx] : the oropharynx was normal [Neck Appearance] : the appearance of the neck was normal [Neck Cervical Mass (___cm)] : no neck mass was observed [Jugular Venous Distention Increased] : there was no jugular-venous distention [Thyroid Diffuse Enlargement] : the thyroid was not enlarged [Thyroid Nodule] : there were no palpable thyroid nodules [Auscultation Breath Sounds / Voice Sounds] : lungs were clear to auscultation bilaterally [Heart Rate And Rhythm] : heart rate was normal and rhythm regular [Heart Sounds] : normal S1 and S2 [Heart Sounds Gallop] : no gallops [Murmurs] : no murmurs [Heart Sounds Pericardial Friction Rub] : no pericardial rub [Edema] : there was no peripheral edema [Bowel Sounds] : normal bowel sounds [Abdomen Soft] : soft [Abdomen Tenderness] : non-tender [Abnormal Walk] : normal gait [Involuntary Movements] : no involuntary movements were seen [Skin Color & Pigmentation] : normal skin color and pigmentation [] : no rash [Oriented To Time, Place, And Person] : oriented to person, place, and time [Affect] : the affect was normal [Mood] : the mood was normal

## 2019-08-21 NOTE — ASSESSMENT
[FreeTextEntry1] : Patient is a 78-year-old gentleman with past medical history of hypertension, hypothyroid, COPD, hyperlipidemia, previous lung cancer with lumpectomy of left lower lobe of the lung, and recent hospitalization for cholangitis secondary to choledocholithiasis. Patient had ERCP at [facility] done in May of 2019 with sphincterotomy, stone extraction, and hemostasis. Patient later had cholecystectomy at [facility] in early July 2019. Patient feels much better since initial hospitalization but notes occasional abdominal pain which has improved gradually since cholecystectomy. Patient does not recall last CRC but does not recall a history of Colonic Polyps. \par \par Previous Choledocholithiasis/ Cholangitis\par - ERCP done May 2019- sphincterotomy, sweeping, and stone extraction, hemostasis controlled with tamponade, no stent left\par - Cholecystectomy July 2019 with Dr. Lemon\par - Clear from a GI perspective\par \par CRC\par - follows Dr. Ott, to follow up with him for routine GI screen

## 2019-10-04 ENCOUNTER — OUTPATIENT (OUTPATIENT)
Dept: OUTPATIENT SERVICES | Facility: HOSPITAL | Age: 79
LOS: 1 days | Discharge: HOME | End: 2019-10-04
Payer: MEDICARE

## 2019-10-04 DIAGNOSIS — C34.90 MALIGNANT NEOPLASM OF UNSPECIFIED PART OF UNSPECIFIED BRONCHUS OR LUNG: ICD-10-CM

## 2019-10-04 DIAGNOSIS — Z90.2 ACQUIRED ABSENCE OF LUNG [PART OF]: Chronic | ICD-10-CM

## 2019-10-04 DIAGNOSIS — E89.0 POSTPROCEDURAL HYPOTHYROIDISM: Chronic | ICD-10-CM

## 2019-10-04 PROCEDURE — 71250 CT THORAX DX C-: CPT | Mod: 26

## 2019-10-18 NOTE — PROGRESS NOTE ADULT - PROBLEM SELECTOR PLAN 2
ICD at [facility]
Uncontrolled, was previously uncontrolled in prior visit   Will increase losartan to 100mg  cont other home medications
Controlled on BB and ARB

## 2019-12-12 ENCOUNTER — OUTPATIENT (OUTPATIENT)
Dept: OUTPATIENT SERVICES | Facility: HOSPITAL | Age: 79
LOS: 1 days | Discharge: HOME | End: 2019-12-12

## 2019-12-12 DIAGNOSIS — R06.00 DYSPNEA, UNSPECIFIED: ICD-10-CM

## 2019-12-12 DIAGNOSIS — Z90.2 ACQUIRED ABSENCE OF LUNG [PART OF]: Chronic | ICD-10-CM

## 2019-12-12 DIAGNOSIS — E89.0 POSTPROCEDURAL HYPOTHYROIDISM: Chronic | ICD-10-CM

## 2019-12-23 ENCOUNTER — OUTPATIENT (OUTPATIENT)
Dept: OUTPATIENT SERVICES | Facility: HOSPITAL | Age: 79
LOS: 1 days | Discharge: HOME | End: 2019-12-23
Payer: MEDICARE

## 2019-12-23 DIAGNOSIS — E89.0 POSTPROCEDURAL HYPOTHYROIDISM: Chronic | ICD-10-CM

## 2019-12-23 DIAGNOSIS — Z90.2 ACQUIRED ABSENCE OF LUNG [PART OF]: Chronic | ICD-10-CM

## 2019-12-23 DIAGNOSIS — R06.00 DYSPNEA, UNSPECIFIED: ICD-10-CM

## 2019-12-23 PROCEDURE — 93970 EXTREMITY STUDY: CPT | Mod: 26

## 2020-01-01 NOTE — ASSESSMENT
[FreeTextEntry1] : 77yo male with hx of copd cad s/p pci. had an episode of cholangitis with elevated bilirubin, nausea vomiting fevers. over several days patient's condition improved and he was sent home without any procedures. Patient has been having retrosternal chest pain and vomiting yesterday, accompanied by fevers. Patient does not want to go to hospital because he is feeling better. will get some blood work.informed the patient that if he develops pain nausea vomiting fevers to go to the hospital. 
No

## 2020-07-15 ENCOUNTER — APPOINTMENT (OUTPATIENT)
Dept: GASTROENTEROLOGY | Facility: CLINIC | Age: 80
End: 2020-07-15
Payer: MEDICARE

## 2020-07-15 DIAGNOSIS — K21.9 GASTRO-ESOPHAGEAL REFLUX DISEASE W/OUT ESOPHAGITIS: ICD-10-CM

## 2020-07-15 PROCEDURE — 99443: CPT

## 2020-07-15 NOTE — HISTORY OF PRESENT ILLNESS
[Home] : at home, [unfilled] , at the time of the visit. [place] [place] : at the medical office located in  [Verbal consent obtained from patient] : the patient, [unfilled] [FreeTextEntry4] : Catie Goldstein [de-identified] : Patient is a 79-year-old gentleman with past medical history of hypertension, hypothyroid, COPD, hyperlipidemia, previous lung cancer with lumpectomy of left lower lobe of the lung, and recent hospitalization for cholangitis secondary to choledocholithiasis. Patient had ERCP at [facility] done in May of 2019 with sphincterotomy, stone extraction, and hemostasis. Patient later had cholecystectomy at [facility] in early July 2019. Patient still has some pain, reflux symptoms and has not had recent Colonoscopy.

## 2020-07-15 NOTE — ASSESSMENT
[FreeTextEntry1] : Patient is a 79-year-old gentleman with past medical history of hypertension, hypothyroid, COPD, hyperlipidemia, previous lung cancer with lumpectomy of left lower lobe of the lung, and recent hospitalization for cholangitis secondary to choledocholithiasis. Patient had ERCP at [facility] done in May of 2019 with sphincterotomy, stone extraction, and hemostasis. Patient later had cholecystectomy at [facility] in early July 2019. Patient still has some pain, reflux symptoms and has not had recent Colonoscopy. \par \par Abdominal Pain\par - Plan EGD and Colonoscopy - non urgent\par - Suprep for prep \par - Follow up after procedure

## 2020-09-21 ENCOUNTER — OUTPATIENT (OUTPATIENT)
Dept: OUTPATIENT SERVICES | Facility: HOSPITAL | Age: 80
LOS: 1 days | Discharge: HOME | End: 2020-09-21

## 2020-09-21 ENCOUNTER — LABORATORY RESULT (OUTPATIENT)
Age: 80
End: 2020-09-21

## 2020-09-21 DIAGNOSIS — E89.0 POSTPROCEDURAL HYPOTHYROIDISM: Chronic | ICD-10-CM

## 2020-09-21 DIAGNOSIS — Z11.59 ENCOUNTER FOR SCREENING FOR OTHER VIRAL DISEASES: ICD-10-CM

## 2020-09-21 DIAGNOSIS — Z90.2 ACQUIRED ABSENCE OF LUNG [PART OF]: Chronic | ICD-10-CM

## 2020-09-24 ENCOUNTER — OUTPATIENT (OUTPATIENT)
Dept: OUTPATIENT SERVICES | Facility: HOSPITAL | Age: 80
LOS: 1 days | Discharge: HOME | End: 2020-09-24
Payer: MEDICARE

## 2020-09-24 ENCOUNTER — RESULT REVIEW (OUTPATIENT)
Age: 80
End: 2020-09-24

## 2020-09-24 ENCOUNTER — TRANSCRIPTION ENCOUNTER (OUTPATIENT)
Age: 80
End: 2020-09-24

## 2020-09-24 VITALS
DIASTOLIC BLOOD PRESSURE: 70 MMHG | SYSTOLIC BLOOD PRESSURE: 140 MMHG | HEART RATE: 79 BPM | RESPIRATION RATE: 18 BRPM | TEMPERATURE: 98 F

## 2020-09-24 VITALS
OXYGEN SATURATION: 99 % | RESPIRATION RATE: 18 BRPM | HEART RATE: 73 BPM | SYSTOLIC BLOOD PRESSURE: 122 MMHG | DIASTOLIC BLOOD PRESSURE: 70 MMHG | TEMPERATURE: 98 F

## 2020-09-24 DIAGNOSIS — Z98.890 OTHER SPECIFIED POSTPROCEDURAL STATES: Chronic | ICD-10-CM

## 2020-09-24 DIAGNOSIS — Z90.2 ACQUIRED ABSENCE OF LUNG [PART OF]: Chronic | ICD-10-CM

## 2020-09-24 DIAGNOSIS — E89.0 POSTPROCEDURAL HYPOTHYROIDISM: Chronic | ICD-10-CM

## 2020-09-24 PROCEDURE — 88305 TISSUE EXAM BY PATHOLOGIST: CPT | Mod: 26

## 2020-09-24 PROCEDURE — 45380 COLONOSCOPY AND BIOPSY: CPT

## 2020-09-24 PROCEDURE — 43251 EGD REMOVE LESION SNARE: CPT | Mod: XS

## 2020-09-24 PROCEDURE — 88312 SPECIAL STAINS GROUP 1: CPT | Mod: 26

## 2020-09-24 PROCEDURE — 43239 EGD BIOPSY SINGLE/MULTIPLE: CPT | Mod: XS

## 2020-09-24 RX ORDER — ISOSORBIDE MONONITRATE 60 MG/1
1 TABLET, EXTENDED RELEASE ORAL
Qty: 0 | Refills: 0 | DISCHARGE

## 2020-09-24 RX ORDER — FLUTICASONE FUROATE AND VILANTEROL TRIFENATATE 100; 25 UG/1; UG/1
1 POWDER RESPIRATORY (INHALATION)
Qty: 0 | Refills: 0 | DISCHARGE

## 2020-09-24 RX ORDER — ALLOPURINOL 300 MG
1 TABLET ORAL
Qty: 0 | Refills: 0 | DISCHARGE

## 2020-09-24 RX ORDER — MONTELUKAST 4 MG/1
1 TABLET, CHEWABLE ORAL
Qty: 0 | Refills: 0 | DISCHARGE

## 2020-09-24 RX ORDER — METOPROLOL TARTRATE 50 MG
1 TABLET ORAL
Qty: 0 | Refills: 0 | DISCHARGE

## 2020-09-24 RX ORDER — IBUPROFEN 200 MG
1 TABLET ORAL
Qty: 0 | Refills: 0 | DISCHARGE

## 2020-09-24 RX ORDER — VALSARTAN 80 MG/1
1 TABLET ORAL
Qty: 0 | Refills: 0 | DISCHARGE

## 2020-09-24 RX ORDER — ACETAMINOPHEN 500 MG
1 TABLET ORAL
Qty: 0 | Refills: 0 | DISCHARGE

## 2020-09-24 RX ORDER — LEVOTHYROXINE SODIUM 125 MCG
1 TABLET ORAL
Qty: 0 | Refills: 0 | DISCHARGE

## 2020-09-24 RX ORDER — PANTOPRAZOLE SODIUM 20 MG/1
40 TABLET, DELAYED RELEASE ORAL
Qty: 0 | Refills: 0 | DISCHARGE

## 2020-09-24 RX ORDER — RABEPRAZOLE 20 MG/1
40 TABLET, DELAYED RELEASE ORAL
Qty: 0 | Refills: 0 | DISCHARGE

## 2020-09-24 NOTE — ASU DISCHARGE PLAN (ADULT/PEDIATRIC) - ***IN THE EVENT THAT YOU DEVELOP A COMPLICATION AND YOU ARE UNABLE TO REACH YOUR OWN PHYSICIAN, YOU MAY CONTACT:
===========================

Datetime: 2018 18:44

===========================

   

Del Note Time:  15

Del Note Status:  Term Female AGA

Del Note Reason for Attend Other:  Vaginal Delivery

Del Note Interventions:  Assessment; Stimulation; Drying; Suction Upper Airway

Del Note Reason for Attending:  Meconium

LILLY/NICU Del Atten Note Adm DT:  2018 18:46
Statement Selected

## 2020-09-24 NOTE — ASU PATIENT PROFILE, ADULT - PSH
H/O thyroidectomy    S/P lobectomy of lung     H/O colonoscopy  2018  H/O thyroidectomy    S/P lobectomy of lung

## 2020-09-24 NOTE — H&P ADULT - HISTORY OF PRESENT ILLNESS
Patient presents for EGD and colonoscopy. Had some solid foods yesterday but wants procedure done despite being made aware that his prep might be poor.

## 2020-09-24 NOTE — CHART NOTE - NSCHARTNOTEFT_GEN_A_CORE
PACU ANESTHESIA ADMISSION NOTE      Procedure:   Post op diagnosis:      ____  Intubated  TV:______       Rate: ______      FiO2: ______    __x__  Patent Airway    ___x_  Full return of protective reflexes    _x___  Full recovery from anesthesia / back to baseline     Vitals:   T:           R: 12                BP:  110/65                Sat:  100                 P: 76      Mental Status:  _x__ Awake   _x____ Alert   _____ Drowsy   _____ Sedated    Nausea/Vomiting:  _x___ NO  ______Yes,   See Post - Op Orders          Pain Scale (0-10):  _x____    Treatment: ____ None    ____ See Post - Op/PCA Orders    Post - Operative Fluids:   __x__ Oral   ____ See Post - Op Orders  x__Home       _____Floor     _____Critical Care    _____  Other:_________________    Comments: tolerated procedure well

## 2020-09-28 LAB
SURGICAL PATHOLOGY STUDY: SIGNIFICANT CHANGE UP
SURGICAL PATHOLOGY STUDY: SIGNIFICANT CHANGE UP

## 2020-09-29 DIAGNOSIS — K20.9 ESOPHAGITIS, UNSPECIFIED: ICD-10-CM

## 2020-09-29 DIAGNOSIS — Z79.82 LONG TERM (CURRENT) USE OF ASPIRIN: ICD-10-CM

## 2020-09-29 DIAGNOSIS — K29.50 UNSPECIFIED CHRONIC GASTRITIS WITHOUT BLEEDING: ICD-10-CM

## 2020-09-29 DIAGNOSIS — Z86.010 PERSONAL HISTORY OF COLONIC POLYPS: ICD-10-CM

## 2020-09-29 DIAGNOSIS — K57.30 DIVERTICULOSIS OF LARGE INTESTINE WITHOUT PERFORATION OR ABSCESS WITHOUT BLEEDING: ICD-10-CM

## 2020-09-29 DIAGNOSIS — K62.1 RECTAL POLYP: ICD-10-CM

## 2020-09-29 DIAGNOSIS — D12.3 BENIGN NEOPLASM OF TRANSVERSE COLON: ICD-10-CM

## 2020-09-29 DIAGNOSIS — K31.7 POLYP OF STOMACH AND DUODENUM: ICD-10-CM

## 2020-09-29 DIAGNOSIS — K22.8 OTHER SPECIFIED DISEASES OF ESOPHAGUS: ICD-10-CM

## 2020-12-02 ENCOUNTER — APPOINTMENT (OUTPATIENT)
Dept: GASTROENTEROLOGY | Facility: CLINIC | Age: 80
End: 2020-12-02
Payer: MEDICARE

## 2020-12-02 PROCEDURE — 99443: CPT

## 2020-12-02 NOTE — HISTORY OF PRESENT ILLNESS
[Home] : at home, [unfilled] , at the time of the visit. [Medical Office: ([facility])___] : at the medical office located in  [Verbal consent obtained from patient] : the patient, [unfilled] [de-identified] : Patient is a 79-year-old gentleman with past medical history of hypertension, hypothyroid, COPD, hyperlipidemia, previous lung cancer with lumpectomy of left lower lobe of the lung, and recent hospitalization for cholangitis secondary to choledocholithiasis. Patient had ERCP at [facility] done in May of 2019 with sphincterotomy, stone extraction, and hemostasis. Patient later had cholecystectomy at [facility] in early July 2019. Patient is s/p EGD and colonoscopy done 9/2020. EGD without IM nor H.Pylori. Colonoscopy without dysplasia and prior scar site biopsies also without dysplasia. Patient feels well but notes cramping daily in the morning prior to having loose, non-bloody stools. He has not had change in weight, change in appetite, nor change in medications.

## 2021-12-07 ENCOUNTER — APPOINTMENT (OUTPATIENT)
Age: 81
End: 2021-12-07
Payer: MEDICARE

## 2021-12-07 VITALS
BODY MASS INDEX: 30.62 KG/M2 | SYSTOLIC BLOOD PRESSURE: 104 MMHG | WEIGHT: 231 LBS | HEART RATE: 67 BPM | DIASTOLIC BLOOD PRESSURE: 66 MMHG | HEIGHT: 73 IN | RESPIRATION RATE: 14 BRPM | OXYGEN SATURATION: 95 %

## 2021-12-07 PROCEDURE — 99213 OFFICE O/P EST LOW 20 MIN: CPT

## 2021-12-07 NOTE — DISCUSSION/SUMMARY
[FreeTextEntry1] : CHRONIC COUGH/ ASTHMATIC BRONCHITIS/ GERD\par ALTAF BETTER LOST WELL\par CARCINOID SX SX\par GI REVIEWED

## 2022-02-28 ENCOUNTER — OUTPATIENT (OUTPATIENT)
Dept: OUTPATIENT SERVICES | Facility: HOSPITAL | Age: 82
LOS: 1 days | Discharge: HOME | End: 2022-02-28
Payer: MEDICARE

## 2022-02-28 DIAGNOSIS — Z98.890 OTHER SPECIFIED POSTPROCEDURAL STATES: Chronic | ICD-10-CM

## 2022-02-28 DIAGNOSIS — E89.0 POSTPROCEDURAL HYPOTHYROIDISM: Chronic | ICD-10-CM

## 2022-02-28 DIAGNOSIS — Z90.2 ACQUIRED ABSENCE OF LUNG [PART OF]: Chronic | ICD-10-CM

## 2022-02-28 DIAGNOSIS — R53.83 OTHER FATIGUE: ICD-10-CM

## 2022-02-28 PROCEDURE — 74183 MRI ABD W/O CNTR FLWD CNTR: CPT | Mod: 26

## 2022-04-06 NOTE — ASU DISCHARGE PLAN (ADULT/PEDIATRIC) - "IF YOU OR YOUR GUARDIAN/FAMILY IS A SMOKER, IT IS IMPORTANT FOR YOUR HEALTH TO STOP SMOKING. PLEASE BE AWARE THAT SECOND HAND SMOKE IS ALSO HARMFUL."
This visit is being performed virtually via Video visit using [employer].   Clinical Location: Home  Crystal's location Home and is physically present in   the [facility] at the time of this visit.   This visit was done via video      ASSESSMENT: Low vitamin B12 level  (primary encounter diagnosis)   discussed vitamin B12 which is trending down over the years  In my opinion I think this is dietary and not a congenital problem   She has tried the supplement couple years ago and did not tolerate  Most of this visit was spent talking about types of replacement and what would be best suited   I told her that I would get Audelia-pharmacist-involved with the replacement process and we would go low and slow  If you run into problems we will refer her to hematology   Her referable route is sublingual either drops or tablet   If she does not tolerate this then she would consider a lower dose of vitamin B12 injections  Will get back to her after contacting pharmacist   Statement Selected

## 2022-04-21 ENCOUNTER — OUTPATIENT (OUTPATIENT)
Dept: OUTPATIENT SERVICES | Facility: HOSPITAL | Age: 82
LOS: 1 days | Discharge: HOME | End: 2022-04-21
Payer: MEDICARE

## 2022-04-21 DIAGNOSIS — Z90.2 ACQUIRED ABSENCE OF LUNG [PART OF]: Chronic | ICD-10-CM

## 2022-04-21 DIAGNOSIS — E89.0 POSTPROCEDURAL HYPOTHYROIDISM: Chronic | ICD-10-CM

## 2022-04-21 DIAGNOSIS — Z98.890 OTHER SPECIFIED POSTPROCEDURAL STATES: Chronic | ICD-10-CM

## 2022-04-21 DIAGNOSIS — R10.9 UNSPECIFIED ABDOMINAL PAIN: ICD-10-CM

## 2022-04-21 PROCEDURE — 76700 US EXAM ABDOM COMPLETE: CPT | Mod: 26

## 2022-05-18 ENCOUNTER — OUTPATIENT (OUTPATIENT)
Dept: OUTPATIENT SERVICES | Facility: HOSPITAL | Age: 82
LOS: 1 days | Discharge: HOME | End: 2022-05-18
Payer: MEDICARE

## 2022-05-18 DIAGNOSIS — E89.0 POSTPROCEDURAL HYPOTHYROIDISM: Chronic | ICD-10-CM

## 2022-05-18 DIAGNOSIS — Z90.2 ACQUIRED ABSENCE OF LUNG [PART OF]: Chronic | ICD-10-CM

## 2022-05-18 DIAGNOSIS — Z98.890 OTHER SPECIFIED POSTPROCEDURAL STATES: Chronic | ICD-10-CM

## 2022-05-18 DIAGNOSIS — R13.10 DYSPHAGIA, UNSPECIFIED: ICD-10-CM

## 2022-05-18 PROCEDURE — 74220 X-RAY XM ESOPHAGUS 1CNTRST: CPT | Mod: 26

## 2022-06-07 ENCOUNTER — APPOINTMENT (OUTPATIENT)
Age: 82
End: 2022-06-07
Payer: MEDICARE

## 2022-06-07 VITALS
WEIGHT: 224 LBS | HEART RATE: 70 BPM | OXYGEN SATURATION: 96 % | HEIGHT: 73 IN | DIASTOLIC BLOOD PRESSURE: 76 MMHG | SYSTOLIC BLOOD PRESSURE: 126 MMHG | RESPIRATION RATE: 14 BRPM | BODY MASS INDEX: 29.69 KG/M2

## 2022-06-07 DIAGNOSIS — R10.9 UNSPECIFIED ABDOMINAL PAIN: ICD-10-CM

## 2022-06-07 PROCEDURE — 99213 OFFICE O/P EST LOW 20 MIN: CPT

## 2022-06-07 NOTE — DISCUSSION/SUMMARY
[FreeTextEntry1] : CHRONIC COUGH/ ASTHMATIC BRONCHITIS/ GERD\par ALTAF BETTER LOST WELL\par CARCINOID SP SX CHEST CT REVIEWED\par GI REVIEWED

## 2022-06-07 NOTE — HISTORY OF PRESENT ILLNESS
[TextBox_4] : COUGH/ COPD\par SOB ON EXERTION\par SP CHEST CT 8/21\par CO ABD PAIN FOLLOWED BY GI\par

## 2022-09-19 ENCOUNTER — NON-APPOINTMENT (OUTPATIENT)
Age: 82
End: 2022-09-19

## 2022-12-06 ENCOUNTER — APPOINTMENT (OUTPATIENT)
Age: 82
End: 2022-12-06

## 2022-12-06 VITALS
WEIGHT: 226 LBS | BODY MASS INDEX: 29.95 KG/M2 | HEART RATE: 71 BPM | DIASTOLIC BLOOD PRESSURE: 76 MMHG | OXYGEN SATURATION: 96 % | RESPIRATION RATE: 14 BRPM | SYSTOLIC BLOOD PRESSURE: 120 MMHG | HEIGHT: 73 IN

## 2022-12-06 DIAGNOSIS — Z87.09 PERSONAL HISTORY OF OTHER DISEASES OF THE RESPIRATORY SYSTEM: ICD-10-CM

## 2022-12-06 PROCEDURE — 99213 OFFICE O/P EST LOW 20 MIN: CPT

## 2022-12-06 NOTE — DISCUSSION/SUMMARY
[FreeTextEntry1] : CHRONIC COUGH/ ASTHMATIC BRONCHITIS/ BREO ( DO NOT WANT TO REPEAT PFT)\par ALTAF BETTER LOST WEIGHT\par CARCINOID SP SX CHEST CT REVIEWED\par GI REVIEWED\par KEEP VENTOLIN AS NEEDED

## 2022-12-06 NOTE — HISTORY OF PRESENT ILLNESS
[TextBox_4] : COUGH/ COPD ON BREO, INCREASE INHALERS\par SOB ON EXERTION\par SP CHEST CT 8/22 NEW NODULE\par

## 2023-01-30 ENCOUNTER — OUTPATIENT (OUTPATIENT)
Dept: OUTPATIENT SERVICES | Facility: HOSPITAL | Age: 83
LOS: 1 days | Discharge: HOME | End: 2023-01-30
Payer: MEDICARE

## 2023-01-30 DIAGNOSIS — Z90.2 ACQUIRED ABSENCE OF LUNG [PART OF]: Chronic | ICD-10-CM

## 2023-01-30 DIAGNOSIS — R10.9 UNSPECIFIED ABDOMINAL PAIN: ICD-10-CM

## 2023-01-30 DIAGNOSIS — Z98.890 OTHER SPECIFIED POSTPROCEDURAL STATES: Chronic | ICD-10-CM

## 2023-01-30 DIAGNOSIS — E89.0 POSTPROCEDURAL HYPOTHYROIDISM: Chronic | ICD-10-CM

## 2023-01-30 PROCEDURE — 74183 MRI ABD W/O CNTR FLWD CNTR: CPT | Mod: 26

## 2023-03-22 ENCOUNTER — APPOINTMENT (OUTPATIENT)
Dept: PULMONOLOGY | Facility: CLINIC | Age: 83
End: 2023-03-22
Payer: MEDICARE

## 2023-03-22 VITALS
WEIGHT: 218 LBS | OXYGEN SATURATION: 97 % | HEIGHT: 73 IN | DIASTOLIC BLOOD PRESSURE: 86 MMHG | SYSTOLIC BLOOD PRESSURE: 140 MMHG | HEART RATE: 77 BPM | BODY MASS INDEX: 28.89 KG/M2

## 2023-03-22 PROCEDURE — 99214 OFFICE O/P EST MOD 30 MIN: CPT

## 2023-03-22 RX ORDER — DOXYCYCLINE HYCLATE 100 MG/1
100 CAPSULE ORAL TWICE DAILY
Qty: 14 | Refills: 0 | Status: ACTIVE | COMMUNITY
Start: 2023-03-22 | End: 1900-01-01

## 2023-03-22 RX ORDER — PREDNISONE 20 MG/1
20 TABLET ORAL
Qty: 15 | Refills: 0 | Status: ACTIVE | COMMUNITY
Start: 2023-03-22 | End: 1900-01-01

## 2023-03-22 NOTE — DISCUSSION/SUMMARY
[FreeTextEntry1] : CHRONIC COUGH/ ASTHMATIC BRONCHITIS/ BREO ( DO NOT WANT TO REPEAT PFT)/ EXACERBATION\par CHEST CT REVIEWED\par ALTAF BETTER LOST WEIGHT\par CARCINOID SP SX CHEST CT REVIEWED\par GI REVIEWED\par KEEP VENTOLIN AS NEEDED

## 2023-03-22 NOTE — REASON FOR VISIT
[Follow-Up] : a follow-up visit [Abnormal CXR/ Chest CT] : an abnormal CXR/ chest CT [Sleep Apnea] : sleep apnea [Cough] : cough [COPD] : COPD [Shortness of Breath] : shortness of breath

## 2023-03-22 NOTE — HISTORY OF PRESENT ILLNESS
[TextBox_4] : COUGH/ COPD ON BREO, INCREASE INHALERS\par SOB ON EXERTION/ ? CHILLS\par COMPLIANT WITH INHALERS\par

## 2023-05-10 NOTE — PATIENT PROFILE ADULT. - NS PRO MODE OF ARRIVAL
ambulatory Banner Transposition Flap Text: The defect edges were debeveled with a #15 scalpel blade. Given the location of the defect and the proximity to free margins a Banner transposition flap was deemed most appropriate. Using a sterile surgical marker, an appropriate flap was drawn around the defect. The area thus outlined was incised deep to adipose tissue with a #15 scalpel blade. The skin margins were undermined to an appropriate distance in all directions utilizing iris scissors. Following this, the designed flap was carried into the primary defect and sutured into place.

## 2023-08-28 ENCOUNTER — APPOINTMENT (OUTPATIENT)
Dept: PULMONOLOGY | Facility: CLINIC | Age: 83
End: 2023-08-28
Payer: MEDICARE

## 2023-08-28 VITALS
RESPIRATION RATE: 12 BRPM | BODY MASS INDEX: 29.69 KG/M2 | DIASTOLIC BLOOD PRESSURE: 60 MMHG | HEART RATE: 61 BPM | OXYGEN SATURATION: 96 % | HEIGHT: 73 IN | WEIGHT: 224 LBS | SYSTOLIC BLOOD PRESSURE: 120 MMHG

## 2023-08-28 DIAGNOSIS — J44.9 CHRONIC OBSTRUCTIVE PULMONARY DISEASE, UNSPECIFIED: ICD-10-CM

## 2023-08-28 DIAGNOSIS — D3A.00 BENIGN CARCINOID TUMOR OF UNSPECIFIED SITE: ICD-10-CM

## 2023-08-28 PROCEDURE — 99213 OFFICE O/P EST LOW 20 MIN: CPT

## 2023-08-28 NOTE — DISCUSSION/SUMMARY
[FreeTextEntry1] : CHRONIC COUGH/ ASTHMATIC BRONCHITIS/ BREO REPEAT PFT  IF WORSE TRELEGY CHEST CT REVIEWED ALTAF BETTER LOST WEIGHT DO NOT WANT TO USE MACHINE CARCINOID SP SX CHEST CT REVIEWED GI REVIEWED KEEP VENTOLIN AS NEEDED

## 2023-08-28 NOTE — HISTORY OF PRESENT ILLNESS
[TextBox_4] : COUGH/ COPD ON BREO, INCREASE INHALERS STILL COUGHING/ CHOCKING AT NIGHT SP CHEST CT REPORTS GERD ALTAF NOT COMPLIANT COMPLIANT WITH INHALERS

## 2023-09-01 ENCOUNTER — OUTPATIENT (OUTPATIENT)
Dept: OUTPATIENT SERVICES | Facility: HOSPITAL | Age: 83
LOS: 1 days | End: 2023-09-01
Payer: MEDICARE

## 2023-09-01 ENCOUNTER — APPOINTMENT (OUTPATIENT)
Dept: PULMONOLOGY | Facility: HOSPITAL | Age: 83
End: 2023-09-01
Payer: MEDICARE

## 2023-09-01 DIAGNOSIS — Z90.2 ACQUIRED ABSENCE OF LUNG [PART OF]: Chronic | ICD-10-CM

## 2023-09-01 DIAGNOSIS — E89.0 POSTPROCEDURAL HYPOTHYROIDISM: Chronic | ICD-10-CM

## 2023-09-01 DIAGNOSIS — Z98.890 OTHER SPECIFIED POSTPROCEDURAL STATES: Chronic | ICD-10-CM

## 2023-09-01 DIAGNOSIS — R06.02 SHORTNESS OF BREATH: ICD-10-CM

## 2023-09-01 PROCEDURE — 94727 GAS DIL/WSHOT DETER LNG VOL: CPT

## 2023-09-01 PROCEDURE — 94729 DIFFUSING CAPACITY: CPT | Mod: 26

## 2023-09-01 PROCEDURE — 94060 EVALUATION OF WHEEZING: CPT | Mod: 26

## 2023-09-01 PROCEDURE — 94727 GAS DIL/WSHOT DETER LNG VOL: CPT | Mod: 26

## 2023-09-01 PROCEDURE — 94070 EVALUATION OF WHEEZING: CPT

## 2023-09-01 PROCEDURE — 94729 DIFFUSING CAPACITY: CPT

## 2023-09-02 DIAGNOSIS — R06.02 SHORTNESS OF BREATH: ICD-10-CM

## 2024-02-20 RX ORDER — FLUTICASONE FUROATE, UMECLIDINIUM BROMIDE AND VILANTEROL TRIFENATATE 100; 62.5; 25 UG/1; UG/1; UG/1
100-62.5-25 POWDER RESPIRATORY (INHALATION)
Qty: 3 | Refills: 3 | Status: ACTIVE | COMMUNITY
Start: 2023-09-08 | End: 1900-01-01

## 2024-03-22 ENCOUNTER — APPOINTMENT (OUTPATIENT)
Dept: PULMONOLOGY | Facility: CLINIC | Age: 84
End: 2024-03-22
Payer: MEDICARE

## 2024-03-22 VITALS
HEIGHT: 73 IN | RESPIRATION RATE: 14 BRPM | WEIGHT: 215 LBS | DIASTOLIC BLOOD PRESSURE: 80 MMHG | HEART RATE: 86 BPM | SYSTOLIC BLOOD PRESSURE: 130 MMHG | BODY MASS INDEX: 28.49 KG/M2 | OXYGEN SATURATION: 98 %

## 2024-03-22 DIAGNOSIS — J44.1 CHRONIC OBSTRUCTIVE PULMONARY DISEASE WITH (ACUTE) EXACERBATION: ICD-10-CM

## 2024-03-22 DIAGNOSIS — G47.33 OBSTRUCTIVE SLEEP APNEA (ADULT) (PEDIATRIC): ICD-10-CM

## 2024-03-22 DIAGNOSIS — R05.3 CHRONIC COUGH: ICD-10-CM

## 2024-03-22 PROCEDURE — G2211 COMPLEX E/M VISIT ADD ON: CPT

## 2024-03-22 PROCEDURE — 99214 OFFICE O/P EST MOD 30 MIN: CPT

## 2024-03-22 RX ORDER — ALBUTEROL SULFATE 2.5 MG/3ML
(2.5 MG/3ML) SOLUTION RESPIRATORY (INHALATION) 4 TIMES DAILY
Qty: 5 | Refills: 3 | Status: ACTIVE | COMMUNITY
Start: 2024-03-22 | End: 1900-01-01

## 2024-03-22 NOTE — DISCUSSION/SUMMARY
[FreeTextEntry1] : CHRONIC COUGH/ ASTHMATIC BRONCHITIS/ TRELEGY CHEST CT REVIEWED ALTAF BETTER LOST WEIGHT DO NOT WANT TO USE MACHINE CARCINOID SP SX CHEST CT REVIEWED GI REVIEWED KEEP VENTOLIN AS NEEDED DISCUSS [facility] WILL ORDER NEB MACHINE

## 2024-03-22 NOTE — PHYSICAL EXAM
[No Acute Distress] : no acute distress [Normal Oropharynx] : normal oropharynx [Normal Appearance] : normal appearance [No Neck Mass] : no neck mass [Normal Rate/Rhythm] : normal rate/rhythm [Normal S1, S2] : normal s1, s2 [No Murmurs] : no murmurs [No Abnormalities] : no abnormalities [Benign] : benign [Normal Gait] : normal gait [No Clubbing] : no clubbing [No Cyanosis] : no cyanosis [No Edema] : no edema [FROM] : FROM [Normal Color/ Pigmentation] : normal color/ pigmentation [No Focal Deficits] : no focal deficits [Oriented x3] : oriented x3 [Normal Affect] : normal affect [TextBox_68] : MARIO GIANG

## 2024-03-22 NOTE — HISTORY OF PRESENT ILLNESS
[TextBox_4] : COUGH/ COPD ON TRELEGY, INCREASE INHALERS STILL COUGHING/ CHOCKING AT NIGHT SP CHEST CT REPORTS GERD ALTAF NOT COMPLIANT COMPLIANT WITH INHALERS

## 2024-04-15 RX ORDER — ALBUTEROL SULFATE 2.5 MG/3ML
(2.5 MG/3ML) SOLUTION RESPIRATORY (INHALATION) 4 TIMES DAILY
Qty: 1 | Refills: 3 | Status: ACTIVE | COMMUNITY
Start: 2024-04-15 | End: 1900-01-01

## 2024-05-16 NOTE — PATIENT PROFILE ADULT. - TEACHING/LEARNING FACTORS IMPACT ABILITY TO LEARN
Pt family states she went to urgent care and dx with a UTI. Pt more confused than normal and urgent care sent her here for more work up   
none

## 2024-05-28 RX ORDER — ALBUTEROL SULFATE 90 UG/1
108 (90 BASE) AEROSOL, METERED RESPIRATORY (INHALATION) EVERY 4 HOURS
Qty: 3 | Refills: 3 | Status: ACTIVE | COMMUNITY
Start: 2021-12-07 | End: 1900-01-01

## 2024-09-23 ENCOUNTER — APPOINTMENT (OUTPATIENT)
Dept: PULMONOLOGY | Facility: CLINIC | Age: 84
End: 2024-09-23
Payer: MEDICARE

## 2024-09-23 VITALS
RESPIRATION RATE: 14 BRPM | HEART RATE: 71 BPM | BODY MASS INDEX: 27.7 KG/M2 | SYSTOLIC BLOOD PRESSURE: 120 MMHG | WEIGHT: 209 LBS | DIASTOLIC BLOOD PRESSURE: 76 MMHG | OXYGEN SATURATION: 96 % | HEIGHT: 73 IN

## 2024-09-23 DIAGNOSIS — R05.3 CHRONIC COUGH: ICD-10-CM

## 2024-09-23 DIAGNOSIS — R04.2 HEMOPTYSIS: ICD-10-CM

## 2024-09-23 DIAGNOSIS — D3A.00 BENIGN CARCINOID TUMOR OF UNSPECIFIED SITE: ICD-10-CM

## 2024-09-23 DIAGNOSIS — J44.1 CHRONIC OBSTRUCTIVE PULMONARY DISEASE WITH (ACUTE) EXACERBATION: ICD-10-CM

## 2024-09-23 PROCEDURE — 99214 OFFICE O/P EST MOD 30 MIN: CPT

## 2024-09-23 PROCEDURE — G2211 COMPLEX E/M VISIT ADD ON: CPT

## 2024-09-23 NOTE — REASON FOR VISIT
[Follow-Up] : a follow-up visit [Abnormal CXR/ Chest CT] : an abnormal CXR/ chest CT [Sleep Apnea] : sleep apnea [Cough] : cough [COPD] : COPD [Shortness of Breath] : shortness of breath [TextBox_44] : HEMOPTYSIS

## 2024-09-23 NOTE — DISCUSSION/SUMMARY
[FreeTextEntry1] : CHRONIC COUGH/ ASTHMATIC BRONCHITIS/ TRELEGY HEMOPTYSIS REPAT CT ALTAF BETTER LOST WEIGHT DO NOT WANT TO USE MACHINE CARCINOID SP SX CHEST CT REVIEWED GI REVIEWED KEEP VENTOLIN AS NEEDED DECLINED [facility] WILL ORDER NEB MACHINE

## 2024-09-23 NOTE — HISTORY OF PRESENT ILLNESS
[TextBox_4] : COUGH/ COPD ON TRELEGY,  HEMOPTYSIS LAST 2 WEEKS SOB ON MINIMAL EXERTION REPORTS GERD ALTAF NOT COMPLIANT COMPLIANT WITH INHALERS

## 2024-10-23 ENCOUNTER — OUTPATIENT (OUTPATIENT)
Dept: OUTPATIENT SERVICES | Facility: HOSPITAL | Age: 84
LOS: 1 days | End: 2024-10-23
Payer: MEDICARE

## 2024-10-23 ENCOUNTER — RESULT REVIEW (OUTPATIENT)
Age: 84
End: 2024-10-23

## 2024-10-23 DIAGNOSIS — J44.1 CHRONIC OBSTRUCTIVE PULMONARY DISEASE WITH (ACUTE) EXACERBATION: ICD-10-CM

## 2024-10-23 DIAGNOSIS — E89.0 POSTPROCEDURAL HYPOTHYROIDISM: Chronic | ICD-10-CM

## 2024-10-23 DIAGNOSIS — Z00.8 ENCOUNTER FOR OTHER GENERAL EXAMINATION: ICD-10-CM

## 2024-10-23 DIAGNOSIS — Z90.2 ACQUIRED ABSENCE OF LUNG [PART OF]: Chronic | ICD-10-CM

## 2024-10-23 DIAGNOSIS — R04.2 HEMOPTYSIS: ICD-10-CM

## 2024-10-23 DIAGNOSIS — Z98.890 OTHER SPECIFIED POSTPROCEDURAL STATES: Chronic | ICD-10-CM

## 2024-10-23 PROCEDURE — 71250 CT THORAX DX C-: CPT | Mod: 26

## 2024-10-23 PROCEDURE — 71250 CT THORAX DX C-: CPT

## 2024-10-24 DIAGNOSIS — R04.2 HEMOPTYSIS: ICD-10-CM

## 2024-10-24 DIAGNOSIS — J44.1 CHRONIC OBSTRUCTIVE PULMONARY DISEASE WITH (ACUTE) EXACERBATION: ICD-10-CM

## 2024-10-28 ENCOUNTER — NON-APPOINTMENT (OUTPATIENT)
Age: 84
End: 2024-10-28

## 2025-03-15 ENCOUNTER — RX RENEWAL (OUTPATIENT)
Age: 85
End: 2025-03-15

## 2025-03-26 ENCOUNTER — APPOINTMENT (OUTPATIENT)
Dept: PULMONOLOGY | Facility: CLINIC | Age: 85
End: 2025-03-26
Payer: MEDICARE

## 2025-03-26 VITALS
OXYGEN SATURATION: 97 % | HEART RATE: 84 BPM | WEIGHT: 204 LBS | BODY MASS INDEX: 27.04 KG/M2 | DIASTOLIC BLOOD PRESSURE: 80 MMHG | RESPIRATION RATE: 14 BRPM | HEIGHT: 73 IN | SYSTOLIC BLOOD PRESSURE: 120 MMHG

## 2025-03-26 DIAGNOSIS — J44.9 CHRONIC OBSTRUCTIVE PULMONARY DISEASE, UNSPECIFIED: ICD-10-CM

## 2025-03-26 DIAGNOSIS — G47.33 OBSTRUCTIVE SLEEP APNEA (ADULT) (PEDIATRIC): ICD-10-CM

## 2025-03-26 PROCEDURE — G2211 COMPLEX E/M VISIT ADD ON: CPT

## 2025-03-26 PROCEDURE — 99214 OFFICE O/P EST MOD 30 MIN: CPT

## 2025-04-04 ENCOUNTER — RX RENEWAL (OUTPATIENT)
Age: 85
End: 2025-04-04

## 2025-06-17 NOTE — PATIENT PROFILE ADULT. - AS SC BRADEN ACTIVITY
ISSUES:   1) stage I, HPV positive h/n cancer (tonsillar cancer)     # JACKIE- extranodal involvement    2) kidney function          To do:   1) vaccines   2) follow up ENT- carol   3) follow up radiation   4) colonoscopy   5) navdx test in 3 months (usually 3 mo x 2 years) then every 6 months   6) check thyroid today               PLEASE CALL OFFICE IF ANY CONCERNS [phone]     Return in 3m w [facility] prior (navdx, cbc,cmp)    (3) walks occasionally

## 2025-08-25 RX ORDER — FLUTICASONE FUROATE AND VILANTEROL TRIFENATATE 100; 25 UG/1; UG/1
100-25 POWDER RESPIRATORY (INHALATION)
Qty: 30 | Refills: 5 | Status: ACTIVE | COMMUNITY
Start: 2025-08-25 | End: 1900-01-01